# Patient Record
Sex: MALE | Race: WHITE | Employment: OTHER | URBAN - METROPOLITAN AREA
[De-identification: names, ages, dates, MRNs, and addresses within clinical notes are randomized per-mention and may not be internally consistent; named-entity substitution may affect disease eponyms.]

---

## 2017-01-09 RX ORDER — SIMVASTATIN 40 MG/1
TABLET, FILM COATED ORAL
Qty: 90 TAB | Refills: 1 | Status: SHIPPED | OUTPATIENT
Start: 2017-01-09 | End: 2017-04-20 | Stop reason: SDUPTHER

## 2017-03-10 RX ORDER — CARVEDILOL 25 MG/1
TABLET ORAL
Qty: 60 TAB | Refills: 0 | Status: SHIPPED | OUTPATIENT
Start: 2017-03-10 | End: 2017-04-20 | Stop reason: SDUPTHER

## 2017-03-10 RX ORDER — LISINOPRIL 10 MG/1
TABLET ORAL
Qty: 30 TAB | Refills: 0 | Status: SHIPPED | OUTPATIENT
Start: 2017-03-10 | End: 2017-04-05 | Stop reason: SDUPTHER

## 2017-04-05 RX ORDER — FUROSEMIDE 40 MG/1
TABLET ORAL
Qty: 90 TAB | Refills: 0 | Status: SHIPPED | OUTPATIENT
Start: 2017-04-05 | End: 2017-04-20 | Stop reason: SDUPTHER

## 2017-04-05 RX ORDER — LISINOPRIL 10 MG/1
TABLET ORAL
Qty: 30 TAB | Refills: 0 | Status: SHIPPED | OUTPATIENT
Start: 2017-04-05 | End: 2017-04-20 | Stop reason: SDUPTHER

## 2017-04-20 ENCOUNTER — HOSPITAL ENCOUNTER (OUTPATIENT)
Dept: LAB | Age: 77
Discharge: HOME | End: 2017-04-20
Payer: MEDICARE

## 2017-04-20 ENCOUNTER — OFFICE VISIT (OUTPATIENT)
Dept: INTERNAL MEDICINE CLINIC | Age: 77
End: 2017-04-20

## 2017-04-20 VITALS
HEART RATE: 64 BPM | DIASTOLIC BLOOD PRESSURE: 70 MMHG | BODY MASS INDEX: 31.01 KG/M2 | RESPIRATION RATE: 16 BRPM | SYSTOLIC BLOOD PRESSURE: 130 MMHG | OXYGEN SATURATION: 97 % | WEIGHT: 234 LBS | HEIGHT: 73 IN | TEMPERATURE: 97.9 F

## 2017-04-20 DIAGNOSIS — Z23 NEED FOR TDAP VACCINATION: ICD-10-CM

## 2017-04-20 DIAGNOSIS — M10.20 DRUG-INDUCED GOUT, UNSPECIFIED CHRONICITY, UNSPECIFIED SITE: ICD-10-CM

## 2017-04-20 DIAGNOSIS — Z28.21 INFLUENZA VACCINE REFUSED: Primary | ICD-10-CM

## 2017-04-20 DIAGNOSIS — I50.20 SYSTOLIC CONGESTIVE HEART FAILURE, UNSPECIFIED CONGESTIVE HEART FAILURE CHRONICITY: ICD-10-CM

## 2017-04-20 DIAGNOSIS — Z12.11 COLON CANCER SCREENING: ICD-10-CM

## 2017-04-20 DIAGNOSIS — Z23 ENCOUNTER FOR IMMUNIZATION: ICD-10-CM

## 2017-04-20 DIAGNOSIS — E78.2 MIXED HYPERLIPIDEMIA: ICD-10-CM

## 2017-04-20 DIAGNOSIS — I10 ESSENTIAL HYPERTENSION: ICD-10-CM

## 2017-04-20 DIAGNOSIS — Z13.5 GLAUCOMA SCREENING: ICD-10-CM

## 2017-04-20 DIAGNOSIS — I25.10 ATHEROSCLEROSIS OF NATIVE CORONARY ARTERY OF NATIVE HEART WITHOUT ANGINA PECTORIS: ICD-10-CM

## 2017-04-20 DIAGNOSIS — Z23 NEED FOR SHINGLES VACCINE: ICD-10-CM

## 2017-04-20 DIAGNOSIS — Z12.5 PROSTATE CANCER SCREENING: ICD-10-CM

## 2017-04-20 DIAGNOSIS — Z53.20 COLONOSCOPY REFUSED: ICD-10-CM

## 2017-04-20 PROCEDURE — 82270 OCCULT BLOOD FECES: CPT

## 2017-04-20 PROCEDURE — 87086 URINE CULTURE/COLONY COUNT: CPT

## 2017-04-20 PROCEDURE — 80061 LIPID PANEL: CPT

## 2017-04-20 PROCEDURE — 87077 CULTURE AEROBIC IDENTIFY: CPT

## 2017-04-20 PROCEDURE — 87088 URINE BACTERIA CULTURE: CPT

## 2017-04-20 PROCEDURE — 36415 COLL VENOUS BLD VENIPUNCTURE: CPT

## 2017-04-20 PROCEDURE — 84443 ASSAY THYROID STIM HORMONE: CPT

## 2017-04-20 PROCEDURE — 80053 COMPREHEN METABOLIC PANEL: CPT

## 2017-04-20 PROCEDURE — 81001 URINALYSIS AUTO W/SCOPE: CPT

## 2017-04-20 PROCEDURE — 85025 COMPLETE CBC W/AUTO DIFF WBC: CPT

## 2017-04-20 PROCEDURE — 84153 ASSAY OF PSA TOTAL: CPT

## 2017-04-20 RX ORDER — LISINOPRIL 10 MG/1
TABLET ORAL
Qty: 90 TAB | Refills: 1 | Status: SHIPPED | OUTPATIENT
Start: 2017-04-20 | End: 2017-11-02 | Stop reason: SDUPTHER

## 2017-04-20 RX ORDER — CARVEDILOL 25 MG/1
TABLET ORAL
Qty: 180 TAB | Refills: 1 | Status: SHIPPED | OUTPATIENT
Start: 2017-04-20 | End: 2017-10-06 | Stop reason: SDUPTHER

## 2017-04-20 RX ORDER — SIMVASTATIN 40 MG/1
TABLET, FILM COATED ORAL
Qty: 90 TAB | Refills: 1 | Status: SHIPPED | OUTPATIENT
Start: 2017-04-20 | End: 2018-01-10 | Stop reason: SDUPTHER

## 2017-04-20 RX ORDER — NIACIN 1000 MG/1
1000 TABLET, EXTENDED RELEASE ORAL
Qty: 90 TAB | Refills: 1 | Status: SHIPPED | OUTPATIENT
Start: 2017-04-20 | End: 2018-01-30 | Stop reason: SDUPTHER

## 2017-04-20 RX ORDER — ALLOPURINOL 300 MG/1
TABLET ORAL
Qty: 90 TAB | Refills: 1 | Status: SHIPPED | OUTPATIENT
Start: 2017-04-20 | End: 2017-11-02 | Stop reason: SDUPTHER

## 2017-04-20 RX ORDER — FUROSEMIDE 40 MG/1
TABLET ORAL
Qty: 90 TAB | Refills: 1 | Status: SHIPPED | OUTPATIENT
Start: 2017-04-20 | End: 2017-12-21 | Stop reason: SDUPTHER

## 2017-04-20 RX ORDER — CLOPIDOGREL BISULFATE 75 MG/1
75 TABLET ORAL DAILY
Qty: 90 TAB | Refills: 1 | Status: SHIPPED | OUTPATIENT
Start: 2017-04-20 | End: 2018-01-30 | Stop reason: SDUPTHER

## 2017-04-20 NOTE — PROGRESS NOTES
HPI:  Rachel Braun is a 77y.o. year old male who is here for a routine visit:    Has been doing well. No headache or dizziness. No chest pain or SOB. No cough or wheeze. No change in bowels or bladder. Is seeing cardiology regularly. No bleeding issues. Past Medical History:   Diagnosis Date    Coronary artery disease     Coronary atherosclerosis of native coronary artery 8/20/2010    Essential hypertension     Gout     Gout 8/20/2010    Heart failure ([address]     Hyperlipemia     Ischemic cardiomyopathy     Long term (current) use of antithrombotics/antiplatelets     Mixed hyperlipidemia 8/20/2010    Unspecified essential hypertension        Past Surgical History:   Procedure Laterality Date    BIOPSY PROSTATE      CARDIAC SURG PROCEDURE UNLIST      cabg    HX CHOLECYSTECTOMY      HX CORONARY ARTERY BYPASS GRAFT  12/26/06       Prior to Admission medications    Medication Sig Start Date End Date Taking? Authorizing Provider   carvedilol (COREG) 25 mg tablet TAKE ONE TABLET BY MOUTH TWICE A DAY WITH MEALS 4/20/17  Yes José Wyatt III, MD   furosemide (LASIX) 40 mg tablet TAKE ONE TABLET BY MOUTH DAILY 4/20/17  Yes José Wyatt III, MD   lisinopril (PRINIVIL, ZESTRIL) 10 mg tablet TAKE ONE TABLET BY MOUTH DAILY 4/20/17  Yes José Wyatt III, MD   simvastatin (ZOCOR) 40 mg tablet TAKE ONE TABLET BY MOUTH EVERY NIGHT 4/20/17  Yes José Wyatt III, MD   clopidogrel (PLAVIX) 75 mg tab Take 1 Tab by mouth daily. 4/20/17  Yes José Wyatt III, MD   allopurinol (ZYLOPRIM) 300 mg tablet TAKE ONE TABLET BY MOUTH DAILY 4/20/17  Yes José Wyatt III, MD   niacin (NIASPAN) 1,000 mg Tb24 tab Take 1 Tab by mouth nightly. 4/20/17  Yes Samra Nguyen MD   Diphth, Pertus,Acell,, Tetanus (BOOSTRIX TDAP) 2.5-8-5 Lf-mcg-Lf/0.5mL susp susp 0.5 mL by IntraMUSCular route once for 1 dose.  Indications: DIPHTHERIA-PERTUSSIS-TETANUS COMBINED PREVENTION 4/20/17 4/20/17 Yes Samra Nguyen MD varicella zoster vacine live (ZOSTAVAX) 19,400 unit/0.65 mL susr injection 1 Vial by SubCUTAneous route once for 1 dose. Indications: PREVENTION OF HERPES ZOSTER 4/20/17 4/20/17 Yes Vi Hinton MD   pneumococcal 13 demetrius conj dip (PREVNAR-13) 0.5 mL syrg injection 0.5 mL by IntraMUSCular route once for 1 dose. Indications: PREVENTION OF STREPTOCOCCUS PNEUMONIAE INFECTION 4/20/17 4/20/17 Yes Vi Hinton MD   methocarbamol (ROBAXIN) 500 mg tablet Take 1 tablet by mouth nightly as needed. 12/10/14  Yes Elli Garrett III, MD   colchicine 0.6 mg tablet Take 1 tablet by mouth two (2) times daily as needed. 12/10/14  Yes Elli Garrett III, MD   aspirin delayed-release 81 mg tablet Take 1 Tab by mouth daily. 5/1/14  Yes Elli Garrett III, MD   acetaminophen (TYLENOL ARTHRITIS) 650 mg CR tablet Take 2 Tabs by mouth every six (6) hours as needed for Pain. 12/26/12  Yes Vi Hinton MD   MULTIVITAMIN PO Take  by mouth. Yes Historical Provider   Cholecalciferol, Vitamin D3, (VITAMIN D) 2,000 unit Cap Take  by mouth. Yes Historical Provider   UBIDECARENONE (COQ-10 PO) Take  by mouth. Historical Provider       Social History     Social History    Marital status:      Spouse name: N/A    Number of children: N/A    Years of education: N/A     Occupational History    Not on file. Social History Main Topics    Smoking status: Former Smoker     Years: 30.00     Types: Cigarettes     Quit date: 1/1/1997    Smokeless tobacco: Never Used    Alcohol use 0.0 oz/week      Comment: occ.  Drug use: No    Sexual activity: Yes     Partners: Female     Other Topics Concern    Not on file     Social History Narrative          ROS  Per HPI. No gout issues or joint aches.      Visit Vitals    /70    Pulse 64    Temp 97.9 °F (36.6 °C) (Oral)    Resp 16    Ht 6' 1\" (1.854 m)    Wt 234 lb (106.1 kg)    SpO2 97%    BMI 30.87 kg/m2         Physical Exam   Physical Examination: General appearance - alert, well appearing, and in no distress  Mouth - mucous membranes moist, pharynx normal without lesions  Neck - supple, no significant adenopathy  Lymphatics - no palpable lymphadenopathy, no hepatosplenomegaly  Chest - clear to auscultation, no wheezes, rales or rhonchi, symmetric air entry  Heart - normal rate, regular rhythm, normal S1, S2, no murmurs, rubs, clicks or gallops  Abdomen - soft, nontender, nondistended, no masses or organomegaly  Extremities - peripheral pulses normal, no pedal edema, no clubbing or cyanosis      Assessment/Plan:  Amando Peña was seen today for medication evaluation. Diagnoses and all orders for this visit:    Influenza vaccine refused    Glaucoma screening  -     REFERRAL TO OPHTHALMOLOGY    Colonoscopy refused    Colon cancer screening  -     OCCULT BLOOD, IMMUNOASSAY (FIT)    Need for Tdap vaccination  -     Diphth, Pertus,Acell,, Tetanus (BOOSTRIX TDAP) 2.5-8-5 Lf-mcg-Lf/0.5mL susp susp; 0.5 mL by IntraMUSCular route once for 1 dose. Indications: DIPHTHERIA-PERTUSSIS-TETANUS COMBINED PREVENTION    Need for shingles vaccine  -     varicella zoster vacine live (ZOSTAVAX) 19,400 unit/0.65 mL susr injection; 1 Vial by SubCUTAneous route once for 1 dose. Indications: PREVENTION OF HERPES ZOSTER    Encounter for immunization  -     pneumococcal 13 demetrius conj dip (PREVNAR-13) 0.5 mL syrg injection; 0.5 mL by IntraMUSCular route once for 1 dose. Indications: PREVENTION OF STREPTOCOCCUS PNEUMONIAE INFECTION    Systolic congestive heart failure, unspecified congestive heart failure chronicity (HCC) - stable on current meds. Mixed hyperlipidemia - check for LDL of 100. Appears to be tolerating meds well.   -     CBC WITH AUTOMATED DIFF  -     LIPID PANEL  -     METABOLIC PANEL, COMPREHENSIVE  -     UA/M W/RFLX CULTURE, ROUTINE    Atherosclerosis of native coronary artery of native heart without angina pectoris - stable  -     carvedilol (COREG) 25 mg tablet; TAKE ONE TABLET BY MOUTH TWICE A DAY WITH MEALS  -     furosemide (LASIX) 40 mg tablet; TAKE ONE TABLET BY MOUTH DAILY  -     lisinopril (PRINIVIL, ZESTRIL) 10 mg tablet; TAKE ONE TABLET BY MOUTH DAILY  -     simvastatin (ZOCOR) 40 mg tablet; TAKE ONE TABLET BY MOUTH EVERY NIGHT  -     clopidogrel (PLAVIX) 75 mg tab; Take 1 Tab by mouth daily. -     niacin (NIASPAN) 1,000 mg Tb24 tab; Take 1 Tab by mouth nightly.  -     TSH RFX ON ABNORMAL TO FREE T4    Drug-induced gout, unspecified chronicity, unspecified site - stable on meds. -     allopurinol (ZYLOPRIM) 300 mg tablet; TAKE ONE TABLET BY MOUTH DAILY    Essential hypertension - BP controlled. Prostate cancer screening  -     PSA SCREENING (SCREENING)           Follow-up Disposition:  Return in about 1 year (around 4/20/2018) for CPE. Advised him to call back or return to office if symptoms worsen/change/persist.  Discussed expected course/resolution/complications of diagnosis in detail with patient. Medication risks/benefits/costs/interactions/alternatives discussed with patient. He was given an after visit summary which includes diagnoses, current medications, & vitals. He expressed understanding with the diagnosis and plan.

## 2017-04-20 NOTE — PROGRESS NOTES
Chief Complaint   Patient presents with    Medication Evaluation     Goals that were addressed/or need to be completed after this visit:  Health Maintenance Due   Topic    DTaP/Tdap/Td series (1 - Tdap)    ZOSTER VACCINE AGE 60>     MEDICARE YEARLY EXAM     GLAUCOMA SCREENING Q2Y     Pneumococcal 65+ Low/Medium Risk (2 of 2 - PPSV23)     · Patient declines influenza vaccines - will consider TDAP, zostavax, and Prevnar. · Refuses colonoscopy but agreeable to FIT testing. · Referral to eye ordered today.     Future Appointments - [facility]:  Date Time Provider Jonny Carbajal   11/8/2017 10:30 AM Kalia Marrufo MD [id number] [facility]

## 2017-04-20 NOTE — PATIENT INSTRUCTIONS
As discussed in your appointment today, [address] is an important part of planning for your healthcare future. Discussing your preferences with your family and your care team is a part of good healthcare so that we can be guided by your known values and goals. Our office offers this service for you at your convenience. Our Nurse Navigators and certified [employer] Facilitators, Thedore Claude and Pool Corbett typically schedule family appointments for this service on Wednesdays. To schedule an Advance Care Planning visit or to receive more information about this service, please call [facility] at [phone] and ask to speak directly to Alesha Mead or Group [email or web address]. Advance Care Planning: Care Instructions  Your Care Instructions  It can be hard to live with an illness that cannot be cured. But if your health is getting worse, you may want to make decisions about end-of-life care. Planning for the end of your life does not mean that you are giving up. It is a way to make sure that your wishes are met. Clearly stating your wishes can make it easier for your loved ones. Making plans while you are still able may also ease your mind and make your final days less stressful and more meaningful. Follow-up care is a key part of your treatment and safety. Be sure to make and go to all appointments, and call your doctor if you are having problems. It's also a good idea to know your test results and keep a list of the medicines you take. What can you do to plan for the end of life? · You can bring these issues up with your doctor. You do not need to wait until your doctor starts the conversation. You might start with \"I would not be willing to live with . Shelvy Setting Shelvy Setting Shelvy Setting \" When you complete this sentence it helps your doctor understand your wishes. · Talk openly and honestly with your doctor. This is the best way to understand the decisions you will need to make as your health changes.  Know that you can always change your mind. · Ask your doctor about commonly used life-support measures. These include tube feedings, breathing machines, and fluids given through a vein (IV). Understanding these treatments will help you decide whether you want them. · You may choose to have these life-supporting treatments for a limited time. This allows a trial period to see whether they will help you. You may also decide that you want your doctor to take only certain measures to keep you alive. It is important to spell out these conditions so that your doctor and family understand them. · Talk to your doctor about how long you are likely to live. He or she may be able to give you an idea of what usually happens with your specific illness. · Think about preparing papers that state your wishes. This way there will not be any confusion about what you want. You can change your instructions at any time. Which papers should you prepare? Advance directives are legal papers that tell doctors how you want to be cared for at the end of your life. You do not need a  to write these papers. Ask your doctor or your state health department for information on how to write your advance directives. They may have the forms for each of these types of papers. Make sure your doctor has a copy of these on file, and give a copy to a family member or close friend. · Consider a do-not-resuscitate order (DNR). This order asks that no extra treatments be done if your heart stops or you stop breathing. Extra treatments may include electrical shock to restart your heart or a machine to breathe for you. If you decide to have a DNR order, ask your doctor to explain and write it. Place the order in your home where everyone can easily see it. · Consider a living will. A living will explains your wishes in case you are in a coma or cannot communicate. Living neves tell doctors to use or not use treatments that would keep you alive.  You must have one or two witnesses or a notary present when you sign this form. · Consider a durable power of . This allows you to name a person to make decisions about your care if you are not able to. Most people ask a close friend or family member. Talk to this person about the kinds of treatments you want and those that you do not want. Make sure this person understands your wishes. If this person is not the health care agent named in your advance directive, also talk with your health care agent. These legal papers are simple to change. Tell your doctor what you want to change, and ask him or her to make a note in your medical file. Give your family updated copies of the papers.

## 2017-04-20 NOTE — MR AVS SNAPSHOT
Visit Information Date & Time Provider Department Dept. Phone Encounter #  
 4/20/2017  9:00 AM Yanique Blanco MD [facility] Internal Medicine [phone] [id number] Follow-up Instructions Return in about 1 year (around 4/20/2018) for CPE. Your Appointments 11/8/2017 10:30 AM  
PHYSICAL with Yanique Blanco MD  
[facility] Internal Medicine [address]) Appt Note: [address] [facility] [address]  
Ricardo MG [id number] [address] Upcoming Health Maintenance Date Due DTaP/Tdap/Td series (1 - Tdap) 5/17/1962 ZOSTER VACCINE AGE 60> 5/17/2001 MEDICARE YEARLY EXAM 5/17/2006 GLAUCOMA SCREENING Q2Y 11/23/2012 Pneumococcal 65+ Low/Medium Risk (2 of 2 - PPSV23) 1/13/2014 COLONOSCOPY 4/20/2027 Allergies as of 4/20/2017  Review Complete On: 4/20/2017 By: Yanique Blanco MD  
 No Known Allergies Current Immunizations  Reviewed on 2/4/2016 Name Date Influenza Vaccine Split 11/17/2011 Influenza Vaccine Whole 1/1/2007 Pneumococcal Vaccine (Unspecified Type) 1/13/2009 Not reviewed this visit You Were Diagnosed With   
  
 Codes Comments Influenza vaccine refused    -  Primary ICD-10-CM: Z28.21 ICD-9-CM: V64.06 Glaucoma screening     ICD-10-CM: Z13.5 ICD-9-CM: V80.1 Colonoscopy refused     ICD-10-CM: Z53.20 ICD-9-CM: V64.2 Colon cancer screening     ICD-10-CM: Z12.11 ICD-9-CM: V76.51 Need for Tdap vaccination     ICD-10-CM: Z65 ICD-9-CM: V06.1 Need for shingles vaccine     ICD-10-CM: G40 ICD-9-CM: V04.89 Encounter for immunization     ICD-10-CM: R18 ICD-9-CM: V03.89 Systolic congestive heart failure, unspecified congestive heart failure chronicity (HCC)     ICD-10-CM: I50.20 ICD-9-CM: 428.20, 428.0 Mixed hyperlipidemia     ICD-10-CM: E78.2 ICD-9-CM: 272.2 Atherosclerosis of native coronary artery of native heart without angina pectoris     ICD-10-CM: I25.10 ICD-9-CM: 414.01 Drug-induced gout, unspecified chronicity, unspecified site     ICD-10-CM: M10.20 ICD-9-CM: 274.9, E980.5 Essential hypertension     ICD-10-CM: I10 
ICD-9-CM: 401.9 Prostate cancer screening     ICD-10-CM: Z12.5 ICD-9-CM: V76.44 Vitals BP Pulse Temp Resp Height(growth percentile) Weight(growth percentile) 130/70 64 97.9 °F (36.6 °C) (Oral) 16 6' 1\" (1.854 m) 234 lb (106.1 kg) SpO2 BMI Smoking Status 97% 30.87 kg/m2 Former Smoker Vitals History BMI and BSA Data Body Mass Index Body Surface Area  
 30.87 kg/m 2 2.34 m 2 Preferred Pharmacy Pharmacy Name Phone [facility] [address], [address] [place] [address] [phone] Your Updated Medication List  
  
   
This list is accurate as of: 4/20/17  9:50 AM.  Always use your most recent med list.  
  
  
  
  
 allopurinol 300 mg tablet Commonly known as:  ZYLOPRIM  
TAKE ONE TABLET BY MOUTH DAILY  
  
 aspirin delayed-release 81 mg tablet Take 1 Tab by mouth daily. carvedilol 25 mg tablet Commonly known as:  COREG  
TAKE ONE TABLET BY MOUTH TWICE A DAY WITH MEALS  
  
 clopidogrel 75 mg Tab Commonly known as:  PLAVIX Take 1 Tab by mouth daily. colchicine 0.6 mg tablet Take 1 tablet by mouth two (2) times daily as needed. COQ-10 PO Take  by mouth. Diphth, Pertus(Acell), Tetanus 2.5-8-5 Lf-mcg-Lf/0.5mL Susp susp Commonly known as:  BOOSTRIX TDAP  
0.5 mL by IntraMUSCular route once for 1 dose. Indications: DIPHTHERIA-PERTUSSIS-TETANUS COMBINED PREVENTION  
  
 furosemide 40 mg tablet Commonly known as:  LASIX TAKE ONE TABLET BY MOUTH DAILY  
  
 lisinopril 10 mg tablet Commonly known as:  PRINIVIL, ZESTRIL  
TAKE ONE TABLET BY MOUTH DAILY  
  
 methocarbamol 500 mg tablet Commonly known as:  ROBAXIN  
 Take 1 tablet by mouth nightly as needed. MULTIVITAMIN PO Take  by mouth. niacin 1,000 mg Tb24 tab Commonly known as:  Sherryll Humble Take 1 Tab by mouth nightly. pneumococcal 13 demetrius conj dip 0.5 mL Syrg injection Commonly known as:  PREVNAR-13  
0.5 mL by IntraMUSCular route once for 1 dose. Indications: PREVENTION OF STREPTOCOCCUS PNEUMONIAE INFECTION  
  
 simvastatin 40 mg tablet Commonly known as:  ZOCOR  
TAKE ONE TABLET BY MOUTH EVERY NIGHT  
  
 TYLENOL ARTHRITIS 650 mg CR tablet Generic drug:  acetaminophen Take 2 Tabs by mouth every six (6) hours as needed for Pain.  
  
 varicella zoster vacine live 19,400 unit/0.65 mL Susr injection Commonly known as:  ZOSTAVAX  
1 Vial by SubCUTAneous route once for 1 dose. Indications: PREVENTION OF HERPES ZOSTER  
  
 VITAMIN D 2,000 unit Cap capsule Generic drug:  Cholecalciferol (Vitamin D3) Take  by mouth. Prescriptions Printed Refills Christofer Blazing,, Tetanus (BOOSTRIX TDAP) 2.5-8-5 Lf-mcg-Lf/0.5mL susp susp 0 Si.5 mL by IntraMUSCular route once for 1 dose. Indications: DIPHTHERIA-PERTUSSIS-TETANUS COMBINED PREVENTION Class: Print Route: IntraMUSCular  
 varicella zoster vacine live (ZOSTAVAX) 19,400 unit/0.65 mL susr injection 0 Si Vial by SubCUTAneous route once for 1 dose. Indications: PREVENTION OF HERPES ZOSTER Class: Print Route: SubCUTAneous  
 pneumococcal 13 demetrius conj dip (PREVNAR-13) 0.5 mL syrg injection 0 Si.5 mL by IntraMUSCular route once for 1 dose. Indications: PREVENTION OF STREPTOCOCCUS PNEUMONIAE INFECTION Class: Print Route: IntraMUSCular Prescriptions Sent to Pharmacy Refills  
 carvedilol (COREG) 25 mg tablet 1 Sig: TAKE ONE TABLET BY MOUTH TWICE A DAY WITH MEALS  Class: Normal  
 Pharmacy: [facility], [address] Ph #: [phone]  
 furosemide (LASIX) 40 mg tablet 1  
 Sig: TAKE ONE TABLET BY MOUTH DAILY Class: Normal  
 Pharmacy: [address] [place] [address] Ph #: [phone]  
 lisinopril (PRINIVIL, ZESTRIL) 10 mg tablet 1 Sig: TAKE ONE TABLET BY MOUTH DAILY Class: Normal  
 Pharmacy: [address] [place] [address] Ph #: [phone]  
 simvastatin (ZOCOR) 40 mg tablet 1 Sig: TAKE ONE TABLET BY MOUTH EVERY NIGHT Class: Normal  
 Pharmacy: [address] Ph #: [phone]  
 clopidogrel (PLAVIX) 75 mg tab 1 Sig: Take 1 Tab by mouth daily. Class: Normal  
 Pharmacy: [address] Ph #: [phone] Route: Oral  
 allopurinol (ZYLOPRIM) 300 mg tablet 1 Sig: TAKE ONE TABLET BY MOUTH DAILY Class: Normal  
 Pharmacy: [address] [place] [address] Ph #: [phone]  
 niacin (NIASPAN) 1,000 mg Tb24 tab 1 Sig: Take 1 Tab by mouth nightly. Class: Normal  
 Pharmacy: [address] Ph #: [phone] Route: Oral  
  
We Performed the Following CBC WITH AUTOMATED DIFF [06084 CPT(R)] LIPID PANEL [01043 CPT(R)] METABOLIC PANEL, COMPREHENSIVE [26417 CPT(R)] OCCULT BLOOD, IMMUNOASSAY (FIT) [id number] CPT(R)] PSA SCREENING (SCREENING) [ [facility]] REFERRAL TO OPHTHALMOLOGY [REF57 Custom] TSH RFX ON ABNORMAL TO FREE T4 [[id number] Custom] UA/M W/RFLX CULTURE, ROUTINE [[id number] Custom] Follow-up Instructions Return in about 1 year (around 4/20/2018) for CPE. Referral Information Referral ID Referred By Referred To  
  
 [id number] [address] [facility], [address] Visits Status Start Date End Date 1 New Request 4/20/17 4/20/18  If your referral has a status of pending review or denied, additional information will be sent to support the outcome of this decision. Patient Instructions As discussed in your appointment today, [address] is an important part of planning for your healthcare future. Discussing your preferences with your family and your care team is a part of good healthcare so that we can be guided by your known values and goals. Our office offers this service for you at your convenience. Our Nurse Navigators and certified Respecting Choices ® Facilitators, Paloma Hunt and Ilir Crawford typically schedule family appointments for this service on Wednesdays. To schedule an Advance Care Planning visit or to receive more information about this service, please call [facility] Internal Medicine at [phone] and ask to speak directly to Nikki Viera or Group Cloud[employer]. Advance Care Planning: Care Instructions Your Care Instructions It can be hard to live with an illness that cannot be cured. But if your health is getting worse, you may want to make decisions about end-of-life care. Planning for the end of your life does not mean that you are giving up. It is a way to make sure that your wishes are met. Clearly stating your wishes can make it easier for your loved ones. Making plans while you are still able may also ease your mind and make your final days less stressful and more meaningful. Follow-up care is a key part of your treatment and safety. Be sure to make and go to all appointments, and call your doctor if you are having problems. It's also a good idea to know your test results and keep a list of the medicines you take. What can you do to plan for the end of life? · You can bring these issues up with your doctor. You do not need to wait until your doctor starts the conversation. You might start with \"I would not be willing to live with . Ynes Chavez \" When you complete this sentence it helps your doctor understand your wishes. · Talk openly and honestly with your doctor. This is the best way to understand the decisions you will need to make as your health changes. Know that you can always change your mind. · Ask your doctor about commonly used life-support measures. These include tube feedings, breathing machines, and fluids given through a vein (IV). Understanding these treatments will help you decide whether you want them. · You may choose to have these life-supporting treatments for a limited time. This allows a trial period to see whether they will help you. You may also decide that you want your doctor to take only certain measures to keep you alive. It is important to spell out these conditions so that your doctor and family understand them. · Talk to your doctor about how long you are likely to live. He or she may be able to give you an idea of what usually happens with your specific illness. · Think about preparing papers that state your wishes. This way there will not be any confusion about what you want. You can change your instructions at any time. Which papers should you prepare? Advance directives are legal papers that tell doctors how you want to be cared for at the end of your life. You do not need a  to write these papers. Ask your doctor or your state [facility] department for information on how to write your advance directives. They may have the forms for each of these types of papers. Make sure your doctor has a copy of these on file, and give a copy to a family member or close friend. · Consider a do-not-resuscitate order (DNR). This order asks that no extra treatments be done if your heart stops or you stop breathing. Extra treatments may include electrical shock to restart your heart or a machine to breathe for you. If you decide to have a DNR order, ask your doctor to explain and write it. Place the order in your home where everyone can easily see it. · Consider a living will. A living will explains your wishes in case you are in a coma or cannot communicate. Living neves tell doctors to use or not use treatments that would keep you alive. You must have one or two witnesses or a notary present when you sign this form. · Consider a durable power of . This allows you to name a person to make decisions about your care if you are not able to. Most people ask a close friend or family member. Talk to this person about the kinds of treatments you want and those that you do not want. Make sure this person understands your wishes. If this person is not the health care agent named in your advance directive, also talk with your health care agent. These legal papers are simple to change. Tell your doctor what you want to change, and ask him or her to make a note in your medical file. Give your family updated copies of the papers. Introducing [facility] SERVICES! Dear Kashif Barajas: Thank you for requesting a creads account. Our records indicate that you have previously registered for a creads account but its currently inactive. Please call our creads support line at [phone]. Additional Information If you have questions, please visit the Frequently Asked Questions section of the creads website at [email or web address] Remember, creads is NOT to be used for urgent needs. For medical emergencies, dial 911. Now available from your iPhone and Android! Please provide this summary of care documentation to your next provider. Your primary care clinician is listed as Joseph Howard64 If you have any questions after today's visit, please call [phone].

## 2017-04-24 ENCOUNTER — HOSPITAL ENCOUNTER (OUTPATIENT)
Dept: LAB | Age: 77
Discharge: HOME | End: 2017-04-24
Payer: MEDICARE

## 2017-04-24 PROCEDURE — 82270 OCCULT BLOOD FECES: CPT

## 2017-04-25 LAB
ALBUMIN SERPL-MCNC: 4.4 G/DL (ref 3.5–4.8)
ALBUMIN/GLOB SERPL: 2.2 {RATIO} (ref 1.2–2.2)
ALP SERPL-CCNC: 112 IU/L (ref 39–117)
ALT SERPL-CCNC: 16 IU/L (ref 0–44)
APPEARANCE UR: CLEAR
AST SERPL-CCNC: 21 IU/L (ref 0–40)
BACTERIA #/AREA URNS HPF: ABNORMAL /[HPF]
BACTERIA UR CULT: ABNORMAL
BASOPHILS # BLD AUTO: 0 X10E3/UL (ref 0–0.2)
BASOPHILS NFR BLD AUTO: 1 %
BILIRUB SERPL-MCNC: 0.9 MG/DL (ref 0–1.2)
BILIRUB UR QL STRIP: NEGATIVE
BUN SERPL-MCNC: 20 MG/DL (ref 8–27)
BUN/CREAT SERPL: 18 (ref 10–24)
CALCIUM SERPL-MCNC: 9.1 MG/DL (ref 8.6–10.2)
CASTS URNS QL MICRO: ABNORMAL /LPF
CHLORIDE SERPL-SCNC: 103 MMOL/L (ref 96–106)
CHOLEST SERPL-MCNC: 125 MG/DL (ref 100–199)
CO2 SERPL-SCNC: 25 MMOL/L (ref 18–29)
COLOR UR: YELLOW
CREAT SERPL-MCNC: 1.13 MG/DL (ref 0.76–1.27)
EOSINOPHIL # BLD AUTO: 0.3 X10E3/UL (ref 0–0.4)
EOSINOPHIL NFR BLD AUTO: 4 %
EPI CELLS #/AREA URNS HPF: ABNORMAL /HPF
ERYTHROCYTE [DISTWIDTH] IN BLOOD BY AUTOMATED COUNT: 16.1 % (ref 12.3–15.4)
GLOBULIN SER CALC-MCNC: 2 G/DL (ref 1.5–4.5)
GLUCOSE SERPL-MCNC: 107 MG/DL (ref 65–99)
GLUCOSE UR QL: NEGATIVE
HCT VFR BLD AUTO: 45.1 % (ref 37.5–51)
HDLC SERPL-MCNC: 30 MG/DL
HEMOCCULT STL QL IA: NORMAL
HGB BLD-MCNC: 15 G/DL (ref 12.6–17.7)
HGB UR QL STRIP: NEGATIVE
IMM GRANULOCYTES # BLD: 0 X10E3/UL (ref 0–0.1)
IMM GRANULOCYTES NFR BLD: 0 %
KETONES UR QL STRIP: NEGATIVE
LDLC SERPL CALC-MCNC: 69 MG/DL (ref 0–99)
LEUKOCYTE ESTERASE UR QL STRIP: ABNORMAL
LYMPHOCYTES # BLD AUTO: 1 X10E3/UL (ref 0.7–3.1)
LYMPHOCYTES NFR BLD AUTO: 14 %
MCH RBC QN AUTO: 28.9 PG (ref 26.6–33)
MCHC RBC AUTO-ENTMCNC: 33.3 G/DL (ref 31.5–35.7)
MCV RBC AUTO: 87 FL (ref 79–97)
MICRO URNS: ABNORMAL
MONOCYTES # BLD AUTO: 0.6 X10E3/UL (ref 0.1–0.9)
MONOCYTES NFR BLD AUTO: 9 %
MUCOUS THREADS URNS QL MICRO: PRESENT
NEUTROPHILS # BLD AUTO: 4.9 X10E3/UL (ref 1.4–7)
NEUTROPHILS NFR BLD AUTO: 72 %
NITRITE UR QL STRIP: NEGATIVE
PH UR STRIP: 6 [PH] (ref 5–7.5)
PLATELET # BLD AUTO: 155 X10E3/UL (ref 150–379)
POTASSIUM SERPL-SCNC: 4.1 MMOL/L (ref 3.5–5.2)
PROT SERPL-MCNC: 6.4 G/DL (ref 6–8.5)
PROT UR QL STRIP: NEGATIVE
PSA SERPL-MCNC: 2.7 NG/ML (ref 0–4)
RBC # BLD AUTO: 5.19 X10E6/UL (ref 4.14–5.8)
RBC #/AREA URNS HPF: ABNORMAL /HPF
SODIUM SERPL-SCNC: 146 MMOL/L (ref 134–144)
SP GR UR: 1.01 (ref 1–1.03)
TRIGL SERPL-MCNC: 128 MG/DL (ref 0–149)
TSH SERPL DL<=0.005 MIU/L-ACNC: 0.66 UIU/ML (ref 0.45–4.5)
URINALYSIS REFLEX, 377202: ABNORMAL
UROBILINOGEN UR STRIP-MCNC: 0.2 MG/DL (ref 0.2–1)
VLDLC SERPL CALC-MCNC: 26 MG/DL (ref 5–40)
WBC # BLD AUTO: 6.7 X10E3/UL (ref 3.4–10.8)
WBC #/AREA URNS HPF: ABNORMAL /HPF

## 2017-04-26 LAB — HEMOCCULT STL QL IA: NEGATIVE

## 2017-08-23 LAB — EF %, EXTERNAL: NORMAL

## 2017-10-06 DIAGNOSIS — I25.10 ATHEROSCLEROSIS OF NATIVE CORONARY ARTERY OF NATIVE HEART WITHOUT ANGINA PECTORIS: ICD-10-CM

## 2017-10-06 RX ORDER — CARVEDILOL 25 MG/1
TABLET ORAL
Qty: 180 TAB | Refills: 0 | Status: SHIPPED | OUTPATIENT
Start: 2017-10-06 | End: 2018-01-10 | Stop reason: SDUPTHER

## 2017-11-02 DIAGNOSIS — I25.10 ATHEROSCLEROSIS OF NATIVE CORONARY ARTERY OF NATIVE HEART WITHOUT ANGINA PECTORIS: ICD-10-CM

## 2017-11-02 DIAGNOSIS — M10.20 DRUG-INDUCED GOUT, UNSPECIFIED CHRONICITY, UNSPECIFIED SITE: ICD-10-CM

## 2017-11-02 RX ORDER — LISINOPRIL 10 MG/1
TABLET ORAL
Qty: 90 TAB | Refills: 0 | Status: SHIPPED | OUTPATIENT
Start: 2017-11-02 | End: 2018-01-30 | Stop reason: DRUGHIGH

## 2017-11-02 RX ORDER — ALLOPURINOL 300 MG/1
TABLET ORAL
Qty: 90 TAB | Refills: 0 | Status: SHIPPED | OUTPATIENT
Start: 2017-11-02 | End: 2018-01-30 | Stop reason: SDUPTHER

## 2017-11-20 ENCOUNTER — HOSPITAL ENCOUNTER (OUTPATIENT)
Dept: CARDIAC CATH/INVASIVE PROCEDURES | Age: 77
Discharge: HOME | End: 2017-11-20
Attending: INTERNAL MEDICINE | Admitting: INTERNAL MEDICINE
Payer: MEDICARE

## 2017-11-20 VITALS
DIASTOLIC BLOOD PRESSURE: 61 MMHG | TEMPERATURE: 98 F | HEIGHT: 72 IN | SYSTOLIC BLOOD PRESSURE: 153 MMHG | OXYGEN SATURATION: 99 % | RESPIRATION RATE: 26 BRPM | WEIGHT: 236 LBS | HEART RATE: 68 BPM | BODY MASS INDEX: 31.97 KG/M2

## 2017-11-20 LAB
ANION GAP SERPL CALC-SCNC: 7 MMOL/L (ref 5–15)
BUN SERPL-MCNC: 15 MG/DL (ref 6–20)
BUN/CREAT SERPL: 13 (ref 12–20)
CALCIUM SERPL-MCNC: 9 MG/DL (ref 8.5–10.1)
CHLORIDE SERPL-SCNC: 106 MMOL/L (ref 97–108)
CO2 SERPL-SCNC: 29 MMOL/L (ref 21–32)
CREAT SERPL-MCNC: 1.19 MG/DL (ref 0.7–1.3)
ERYTHROCYTE [DISTWIDTH] IN BLOOD BY AUTOMATED COUNT: 14.9 % (ref 11.5–14.5)
GLUCOSE SERPL-MCNC: 86 MG/DL (ref 65–100)
HCT VFR BLD AUTO: 46.8 % (ref 36.6–50.3)
HGB BLD-MCNC: 15.5 G/DL (ref 12.1–17)
MCH RBC QN AUTO: 29.8 PG (ref 26–34)
MCHC RBC AUTO-ENTMCNC: 33.1 G/DL (ref 30–36.5)
MCV RBC AUTO: 89.8 FL (ref 80–99)
PLATELET # BLD AUTO: 144 K/UL (ref 150–400)
POTASSIUM SERPL-SCNC: 4.2 MMOL/L (ref 3.5–5.1)
RBC # BLD AUTO: 5.21 M/UL (ref 4.1–5.7)
SODIUM SERPL-SCNC: 142 MMOL/L (ref 136–145)
WBC # BLD AUTO: 6.6 K/UL (ref 4.1–11.1)

## 2017-11-20 PROCEDURE — 85027 COMPLETE CBC AUTOMATED: CPT | Performed by: INTERNAL MEDICINE

## 2017-11-20 PROCEDURE — 77030004532 HC CATH ANGI DX IMP BSC -A

## 2017-11-20 PROCEDURE — 36415 COLL VENOUS BLD VENIPUNCTURE: CPT | Performed by: INTERNAL MEDICINE

## 2017-11-20 PROCEDURE — C1894 INTRO/SHEATH, NON-LASER: HCPCS

## 2017-11-20 PROCEDURE — 74011250636 HC RX REV CODE- 250/636: Performed by: INTERNAL MEDICINE

## 2017-11-20 PROCEDURE — 77030013744

## 2017-11-20 PROCEDURE — C1760 CLOSURE DEV, VASC: HCPCS

## 2017-11-20 PROCEDURE — 93459 L HRT ART/GRFT ANGIO: CPT

## 2017-11-20 PROCEDURE — 74011636320 HC RX REV CODE- 636/320: Performed by: INTERNAL MEDICINE

## 2017-11-20 PROCEDURE — 80048 BASIC METABOLIC PNL TOTAL CA: CPT | Performed by: INTERNAL MEDICINE

## 2017-11-20 PROCEDURE — 77030004533 HC CATH ANGI DX IMP BSC -B

## 2017-11-20 RX ORDER — HEPARIN SODIUM 1000 [USP'U]/ML
10000 INJECTION, SOLUTION INTRAVENOUS; SUBCUTANEOUS
Status: DISCONTINUED | OUTPATIENT
Start: 2017-11-20 | End: 2017-11-20

## 2017-11-20 RX ORDER — SODIUM CHLORIDE 0.9 % (FLUSH) 0.9 %
10 SYRINGE (ML) INJECTION AS NEEDED
Status: DISCONTINUED | OUTPATIENT
Start: 2017-11-20 | End: 2017-11-20

## 2017-11-20 RX ORDER — SODIUM CHLORIDE 9 MG/ML
3 INJECTION, SOLUTION INTRAVENOUS CONTINUOUS
Status: DISPENSED | OUTPATIENT
Start: 2017-11-20 | End: 2017-11-20

## 2017-11-20 RX ORDER — CLOPIDOGREL 300 MG/1
600 TABLET, FILM COATED ORAL AS NEEDED
Status: DISCONTINUED | OUTPATIENT
Start: 2017-11-20 | End: 2017-11-20

## 2017-11-20 RX ORDER — LIDOCAINE HYDROCHLORIDE 10 MG/ML
10-30 INJECTION INFILTRATION; PERINEURAL ONCE
Status: DISPENSED | OUTPATIENT
Start: 2017-11-20 | End: 2017-11-21

## 2017-11-20 RX ORDER — SODIUM CHLORIDE 9 MG/ML
1.5 INJECTION, SOLUTION INTRAVENOUS CONTINUOUS
Status: DISPENSED | OUTPATIENT
Start: 2017-11-20 | End: 2017-11-20

## 2017-11-20 RX ORDER — MIDAZOLAM HYDROCHLORIDE 1 MG/ML
.5-1 INJECTION, SOLUTION INTRAMUSCULAR; INTRAVENOUS
Status: DISCONTINUED | OUTPATIENT
Start: 2017-11-20 | End: 2017-11-20

## 2017-11-20 RX ORDER — PRASUGREL 10 MG/1
10-60 TABLET, FILM COATED ORAL AS NEEDED
Status: DISCONTINUED | OUTPATIENT
Start: 2017-11-20 | End: 2017-11-20

## 2017-11-20 RX ORDER — HYDRALAZINE HYDROCHLORIDE 20 MG/ML
10 INJECTION INTRAMUSCULAR; INTRAVENOUS ONCE
Status: COMPLETED | OUTPATIENT
Start: 2017-11-20 | End: 2017-11-20

## 2017-11-20 RX ORDER — FENTANYL CITRATE 50 UG/ML
25-200 INJECTION, SOLUTION INTRAMUSCULAR; INTRAVENOUS
Status: DISCONTINUED | OUTPATIENT
Start: 2017-11-20 | End: 2017-11-20

## 2017-11-20 RX ORDER — SODIUM CHLORIDE 0.9 % (FLUSH) 0.9 %
SYRINGE (ML) INJECTION
Status: DISCONTINUED
Start: 2017-11-20 | End: 2017-11-21 | Stop reason: HOSPADM

## 2017-11-20 RX ORDER — CARVEDILOL 12.5 MG/1
25 TABLET ORAL ONCE
Status: ACTIVE | OUTPATIENT
Start: 2017-11-20 | End: 2017-11-21

## 2017-11-20 RX ORDER — HEPARIN SODIUM 200 [USP'U]/100ML
1000 INJECTION, SOLUTION INTRAVENOUS AS NEEDED
Status: DISCONTINUED | OUTPATIENT
Start: 2017-11-20 | End: 2017-11-20

## 2017-11-20 RX ORDER — ATROPINE SULFATE 0.1 MG/ML
1 INJECTION INTRAVENOUS AS NEEDED
Status: DISCONTINUED | OUTPATIENT
Start: 2017-11-20 | End: 2017-11-20

## 2017-11-20 RX ADMIN — HYDRALAZINE HYDROCHLORIDE 10 MG: 20 INJECTION INTRAMUSCULAR; INTRAVENOUS at 16:06

## 2017-11-20 RX ADMIN — MIDAZOLAM HYDROCHLORIDE 2 MG: 1 INJECTION, SOLUTION INTRAMUSCULAR; INTRAVENOUS at 14:19

## 2017-11-20 RX ADMIN — SODIUM CHLORIDE 1.5 ML/KG/HR: 900 INJECTION, SOLUTION INTRAVENOUS at 14:50

## 2017-11-20 RX ADMIN — FENTANYL CITRATE 25 MCG: 50 INJECTION, SOLUTION INTRAMUSCULAR; INTRAVENOUS at 14:29

## 2017-11-20 RX ADMIN — MIDAZOLAM HYDROCHLORIDE 1 MG: 1 INJECTION, SOLUTION INTRAMUSCULAR; INTRAVENOUS at 14:29

## 2017-11-20 RX ADMIN — FENTANYL CITRATE 50 MCG: 50 INJECTION, SOLUTION INTRAMUSCULAR; INTRAVENOUS at 14:19

## 2017-11-20 RX ADMIN — IOPAMIDOL 180 ML: 755 INJECTION, SOLUTION INTRAVENOUS at 14:51

## 2017-11-20 RX ADMIN — SODIUM CHLORIDE 3 ML/KG/HR: 900 INJECTION, SOLUTION INTRAVENOUS at 13:48

## 2017-11-20 RX ADMIN — HEPARIN SODIUM 2000 UNITS: 200 INJECTION, SOLUTION INTRAVENOUS at 14:22

## 2017-11-20 NOTE — PROGRESS NOTES
Transfer to [facility] from Procedure Area    Verbal report given to Dana Yu on Juan F Hightower being transferred to Cardiac Cath Lab  for ordered procedure   Patient is post left heart catheterization procedure. Patient stable upon transfer to . Report consisted of patients Situation, Background, Assessment and   Recommendations(SBAR). Information from the following report(s) SBAR, Kardex, Procedure Summary, Intake/Output, MAR and Recent Results was reviewed with the receiving nurse. Opportunity for questions and clarification was provided. Patient medicated during procedure with orders obtained and verified by Dr. Tiffanie Josue. Refer to patient PROCEDURE REPORT for vital signs, assessment, status, and response during procedure.

## 2017-11-20 NOTE — IP AVS SNAPSHOT
[address] [facility] [address] [place] [address] 
[phone] Patient: Remington Schwartz MRN: [id number] IOW:8/38/2807 About your hospitalization You were admitted on:  November 20, 2017 You last received care in the:  [address], [facility] You were discharged on:  November 20, 2017 Why you were hospitalized Your primary diagnosis was:  Not on File Things You Need To Do (next 8 weeks) Schedule an appointment with Margarita Thomas MD as soon as possible for a visit in 2 week(s) Phone:  [phone] Where:  [address] [address], [address] [facility] [address], [place] [id number] Discharge Orders None A check jay indicates which time of day the medication should be taken. My Medications TAKE these medications as instructed Instructions Each Dose to Equal  
 Morning Noon Evening Bedtime  
 allopurinol 300 mg tablet Commonly known as:  Emilee Bibber Your last dose was: Your next dose is: TAKE ONE TABLET BY MOUTH DAILY  
     
   
   
   
  
 aspirin delayed-release 81 mg tablet Your last dose was: Your next dose is: Take 1 Tab by mouth daily. 81 mg  
    
   
   
   
  
 carvedilol 25 mg tablet Commonly known as:  Becky Locket Your last dose was: Your next dose is: TAKE ONE TABLET BY MOUTH TWICE A DAY WITH MEALS  
     
   
   
   
  
 clopidogrel 75 mg Tab Commonly known as:  PLAVIX Your last dose was: Your next dose is: Take 1 Tab by mouth daily. 75 mg  
    
   
   
   
  
 colchicine 0.6 mg tablet Your last dose was: Your next dose is: Take 1 tablet by mouth two (2) times daily as needed. 0.6 mg  
    
   
   
   
  
 furosemide 40 mg tablet Commonly known as:  LASIX Your last dose was: Your next dose is: TAKE ONE TABLET BY MOUTH DAILY GLUCOSAMINE-CONDROITIN-HERB182 PO Your last dose was: Your next dose is: Take  by mouth two (2) times a day. lisinopril 10 mg tablet Commonly known as:  [place] Escort Your last dose was: Your next dose is: TAKE ONE TABLET BY MOUTH DAILY  
     
   
   
   
  
 methocarbamol 500 mg tablet Commonly known as:  ROBAXIN Your last dose was: Your next dose is: Take 1 tablet by mouth nightly as needed. 500 mg MULTIVITAMIN PO Your last dose was: Your next dose is: Take  by mouth. niacin 1,000 mg Tb24 tab Commonly known as:  [place] Rina Your last dose was: Your next dose is: Take 1 Tab by mouth nightly. 1000 mg  
    
   
   
   
  
 simvastatin 40 mg tablet Commonly known as:  ZOCOR Your last dose was: Your next dose is: TAKE ONE TABLET BY MOUTH EVERY NIGHT  
     
   
   
   
  
 TYLENOL ARTHRITIS 650 mg Tber Generic drug:  acetaminophen Your last dose was: Your next dose is: Take 2 Tabs by mouth every six (6) hours as needed for Pain. 1300 mg  
    
   
   
   
  
 VITAMIN D 2,000 unit Cap capsule Generic drug:  Cholecalciferol (Vitamin D3) Your last dose was: Your next dose is: Take  by mouth every other day. Discharge Instructions [email or web address] Patient Discharge Instructions Jesenia Wright / [id number] : 1940 Admitted 2017 Discharged: 2017 · It is important that you take the medication exactly as they are prescribed. · Keep your medication in the bottles provided by the pharmacist and keep a list of the medication names, dosages, and times to be taken in your wallet. · Do not take other medications without consulting your doctor. BRING ALL OF YOUR MEDICINES TO YOUR OFFICE VISIT with Dr. Shemar Manzano with Иван Kowalski MD in 2 week Cardiac Catheterization  Discharge Instructions ? Do not drive, operate any machinery, or sign any legal documents for 24 hours after your procedure. You must have someone to drive you home. ? You may take a shower 24 hours after your cardiac catheterization. Be sure to get the dressing wet and then remove it; gently wash the area with warm soapy water. Pat dry and leave open to air. To help prevent infections, be sure to keep the cath site clean and dry. No lotions, creams, powders, ointments, etc. in the cath site for approximately 1 week. ? Do not take a tub bath, get in a hot tub or swimming pool for approximately 5 days or until the cath site is completely healed. ? No strenuous activity or heavy lifting over 10 lbs. for 7 days. ? Drink plenty of fluids for 24-48 hours after your cath to flush the contrast dye from your kidneys. No alcoholic beverages for 24 hours. You may resume your previous diet (low fat, low cholesterol) after your cath. ? After your cath, some bruising or discomfort is common during the healing process. Tylenol, 1-2 tablets every 6 hours as needed, is recommended if you experience any discomfort. If you experience any signs or symptoms of infection such as fever, chills, or poorly healing incision, persistent tenderness or swelling in the groin, redness and/or warmth to the touch, numbness, significant tingling or pain at the groin site or affected extremity, rash, drainage from the cath site, or if the leg feels tight or swollen, call your physician right away. ? If bleeding at the cath site occurs, take a clean gauze pad and apply direct pressure to the groin just above the puncture site.   Call 911 immediately, and continue to apply direct pressure until an ambulance gets to your location. ? You may return to work  2  days after your cardiac cath if no groin bleeding. Information obtained by : 
I understand that if any problems occur once I am at home I am to contact my physician. I understand and acknowledge receipt of the instructions indicated above. R.N.'s Signature                                                                  Date/Time Patient or Representative Signature                                                          Date/Time Eloise Huber MD 
 
 
 
 
ACO Transitions of Care Introducing [employer] [address] Yanet Bolivar offers a voluntary care coordination program to provide high quality service and care to [facility] fee-for-service beneficiaries. Natanaelreed Mark was designed to help you enhance your health and well-being through the following services: ? Transitions of Care  support for individuals who are transitioning from one care setting to another (example: Hospital to home). ? Chronic and Complex Care Coordination  support for individuals and caregivers of those with serious or chronic illnesses or with more than one chronic (ongoing) condition and those who take a number of different medications. If you meet specific medical criteria, a [facility] [address] may call you directly to coordinate your care with your primary care physician and your other care providers. For questions about the [facility] MEDICAL [place] programs, please, contact your physicians office. For general questions or additional information about Accountable Care Organizations: Please visit www.medicare.gov/acos. html or call 1-800-MEDICARE (0-652.836.8097) TTY users should call 0-378.296.7187. Introducing [facility] & HEALTH SERVICES! Dear Codey Lugo: Thank you for requesting a Planwise account. Our records indicate that you have previously registered for a Planwise account but its currently inactive. Please call our Planwise support line at [phone]. Additional Information If you have questions, please visit the Frequently Asked Questions section of the Planwise website at [email or web address] Remember, Planwise is NOT to be used for urgent needs. For medical emergencies, dial 911. Now available from your iPhone and Android! Providers Seen During Your Hospitalization Provider Specialty Primary office phone Margarita Thomas MD Cardiology [phone] Your Primary Care Physician (PCP) Primary Care Physician Office Phone Office Fax Jesus Suero [address], [facility] [phone] You are allergic to the following Allergen Reactions Aggrastat (Tirofiban) Other (comments) Xin Coto. Recent Documentation Height Weight BMI Smoking Status 1.829 m 107 kg 32.01 kg/m2 Former Smoker Emergency Contacts Name Discharge Info Relation Home Work Mobile Brenda Mcmullen DISCHARGE CAREGIVER [3] Spouse [3] [phone] Patient Belongings The following personal items are in your possession at time of discharge: 
     Visual Aid: Glasses (reading glasses.) Please provide this summary of care documentation to your next provider. Signatures-by signing, you are acknowledging that this After Visit Summary has been reviewed with you and you have received a copy. Patient Signature:  ____________________________________________________________  Date:  ____________________________________________________________  
  
Hai Han    
    
 Provider Signature:  ____________________________________________________________ Date:  ____________________________________________________________

## 2017-11-20 NOTE — PROGRESS NOTES
TRANSFER - IN REPORT:    Verbal report received from LAKE Varela RN on Anaya Rose, Procedure Cath procedure with no stents , from the Cardiac Cath lab, for routine progression of care. Report consisted of patients Situation, Background, Assessment and Recommendations(SBAR). Information from the following report(s) Procedure Summary, MAR and Recent Results was reviewed with the receiving clinician. Opportunity for questions and clarification was provided. Assessment completed upon patients arrival to [address] [place] [address] and care assumed. Cardiac Cath Lab Recovery Arrival Note:     Anaya Rose arrived to [facility] recovery area. Patient procedure= Cath procedure with no stents. Patient on cardiac monitor, non-invasive blood pressure, Patient status doing well without problems. Patient is A&Ox 4. Patient reports no pain, no chest pain, no n/v. Procedure site without any bleeding and no hematoma.

## 2017-11-20 NOTE — PROGRESS NOTES
Dr. Jackelyn Vargas made aware of the patient's elevated blood pressure. Order received. 1605:  Head of bed elevated 30degrees right groin site without bleeding and no hematoma. 1634: I have reviewed discharge instructions with the patient and spouse. The patient and spouse verbalized understanding. 1653:  Dr. Jackelyn Vargas made aware of the patient's elevated blood pressure. Patient to take PO coreg at home. Damián Weir ambulated @ 5379 (time) approximately 100 feet. Patient tolerated ambulation without adverse advents. right groin (right/left, groin/arm)  without bleeding or hematoma noted.

## 2017-11-20 NOTE — PROGRESS NOTES
Cardiac Cath Lab Recovery Arrival Note:      Franklyn Bowie arrived to Cardiac Cath Lab, Recovery Area. Staff introduced to patient. Patient identifiers verified with NAME and DATE OF BIRTH. Procedure verified with patient. Consent forms reviewed and signed by patient or authorized representative and verified. Allergies verified. Patient informed of procedure and plan of care. Questions answered with review. Patient prepped for procedure, per orders from physician, prior to arrival.    Patient on cardiac monitor, non-invasive blood pressure, SPO2 monitor. Patient is A&Ox 4. Patient reports no complaints. Patient in stretcher, in low position, with side rails up, call bell within reach, patient instructed to call of assistance as needed. Patient prep in: [facility] Recovery Area, Bed# 2. Family in: waiting room. Prep by: TAYLA Crandall

## 2017-11-20 NOTE — PROGRESS NOTES
1259:  Cardiac Cath Lab Recovery Arrival Note:      Anaya Rose arrived to Cardiac Cath Lab, Recovery Area. Staff introduced to patient. Patient identifiers verified with NAME and DATE OF BIRTH. Procedure verified with patient. Consent forms reviewed and signed by patient or authorized representative and verified. Allergies verified. Patient informed of procedure and plan of care. Questions answered with review. Patient prepped for procedure, per orders from physician, prior to arrival.    Patient on cardiac monitor, non-invasive blood pressure, SPO2 monitor. Patient is A&Ox 4. Patient reports no complaints. Patient in stretcher, in low position, with side rails up, call bell within reach, patient instructed to call of assistance as needed. Patient prep in: [facility] Recovery Area, Bed# 2. Family in: waiting room. Prep by: TAYLA Alex

## 2017-11-20 NOTE — IP AVS SNAPSHOT
2700 [facility] [address] [place] [address] 
[phone] Patient: Richardson Marinelli MRN: [id number] TNF:3/22/2098 My Medications TAKE these medications as instructed Instructions Each Dose to Equal  
 Morning Noon Evening Bedtime  
 allopurinol 300 mg tablet Commonly known as:  Darvin Quintanilla Your last dose was: Your next dose is: TAKE ONE TABLET BY MOUTH DAILY  
     
   
   
   
  
 aspirin delayed-release 81 mg tablet Your last dose was: Your next dose is: Take 1 Tab by mouth daily. 81 mg  
    
   
   
   
  
 carvedilol 25 mg tablet Commonly known as:  Orestes Garduno Your last dose was: Your next dose is: TAKE ONE TABLET BY MOUTH TWICE A DAY WITH MEALS  
     
   
   
   
  
 clopidogrel 75 mg Tab Commonly known as:  PLAVIX Your last dose was: Your next dose is: Take 1 Tab by mouth daily. 75 mg  
    
   
   
   
  
 colchicine 0.6 mg tablet Your last dose was: Your next dose is: Take 1 tablet by mouth two (2) times daily as needed. 0.6 mg  
    
   
   
   
  
 furosemide 40 mg tablet Commonly known as:  LASIX Your last dose was: Your next dose is: TAKE ONE TABLET BY MOUTH DAILY  
     
   
   
   
  
 GLUCOSAMINE-CONDROITIN-HERB182 PO Your last dose was: Your next dose is: Take  by mouth two (2) times a day. lisinopril 10 mg tablet Commonly known as:  Arianna Reasons Your last dose was: Your next dose is: TAKE ONE TABLET BY MOUTH DAILY  
     
   
   
   
  
 methocarbamol 500 mg tablet Commonly known as:  ROBAXIN Your last dose was: Your next dose is: Take 1 tablet by mouth nightly as needed. 500 mg MULTIVITAMIN PO Your last dose was: Your next dose is: Take  by mouth. niacin 1,000 mg Tb24 tab Commonly known as:  Aureliano Schwartz Your last dose was: Your next dose is: Take 1 Tab by mouth nightly. 1000 mg  
    
   
   
   
  
 simvastatin 40 mg tablet Commonly known as:  ZOCOR Your last dose was: Your next dose is: TAKE ONE TABLET BY MOUTH EVERY NIGHT  
     
   
   
   
  
 TYLENOL ARTHRITIS 650 mg Tber Generic drug:  acetaminophen Your last dose was: Your next dose is: Take 2 Tabs by mouth every six (6) hours as needed for Pain. 1300 mg  
    
   
   
   
  
 VITAMIN D 2,000 unit Cap capsule Generic drug:  Cholecalciferol (Vitamin D3) Your last dose was: Your next dose is: Take  by mouth every other day.

## 2017-11-20 NOTE — PROGRESS NOTES
Cardiac Cath Lab Procedure Area Arrival Note:    Richardson Marinelli arrived to Cardiac Cath Lab, Procedure Area. Patient identifiers verified with NAME and DATE OF BIRTH. Procedure verified with patient. Consent forms verified. Allergies verified. Patient informed of procedure and plan of care. Questions answered with review. Patient voiced understanding of procedure and plan of care. Patient on cardiac monitor, non-invasive blood pressure, SPO2 monitor. On room air and placed on O2 @ 2 lpm via nasal cannula. IV of normal saline on pump at 321 ml/hr. Patient status doing well without problems. Patient is A&Ox 4. Patient reports no pain. Patient medicated during procedure with orders obtained and verified by Dr. Chavo He. Refer to patients Cardiac Cath Lab PROCEDURE REPORT for vital signs, assessment, status, and response during procedure, printed at end of case. Printed report on chart or scanned into chart.

## 2017-11-20 NOTE — DISCHARGE INSTRUCTIONS
[email or web address]    Patient Discharge Instructions    Jackelyn Hassan / [id number] : 1940    Admitted 2017 Discharged: 2017       · It is important that you take the medication exactly as they are prescribed. · Keep your medication in the bottles provided by the pharmacist and keep a list of the medication names, dosages, and times to be taken in your wallet. · Do not take other medications without consulting your doctor. BRING ALL OF YOUR MEDICINES TO YOUR OFFICE VISIT with Dr. Abe Kauffman with Jenniffer Sims MD in 2 week      Cardiac Catheterization  Discharge Instructions     Do not drive, operate any machinery, or sign any legal documents for 24 hours after your procedure. You must have someone to drive you home.  You may take a shower 24 hours after your cardiac catheterization. Be sure to get the dressing wet and then remove it; gently wash the area with warm soapy water. Pat dry and leave open to air. To help prevent infections, be sure to keep the cath site clean and dry. No lotions, creams, powders, ointments, etc. in the cath site for approximately 1 week.  Do not take a tub bath, get in a hot tub or swimming pool for approximately 5 days or until the cath site is completely healed.  No strenuous activity or heavy lifting over 10 lbs. for 7 days.  Drink plenty of fluids for 24-48 hours after your cath to flush the contrast dye from your kidneys. No alcoholic beverages for 24 hours. You may resume your previous diet (low fat, low cholesterol) after your cath.  After your cath, some bruising or discomfort is common during the healing process. Tylenol, 1-2 tablets every 6 hours as needed, is recommended if you experience any discomfort.   If you experience any signs or symptoms of infection such as fever, chills, or poorly healing incision, persistent tenderness or swelling in the groin, redness and/or warmth to the touch, numbness, significant tingling or pain at the groin site or affected extremity, rash, drainage from the cath site, or if the leg feels tight or swollen, call your physician right away.  If bleeding at the cath site occurs, take a clean gauze pad and apply direct pressure to the groin just above the puncture site. Call 911 immediately, and continue to apply direct pressure until an ambulance gets to your location.  You may return to work  2  days after your cardiac cath if no groin bleeding. Information obtained by :  I understand that if any problems occur once I am at home I am to contact my physician. I understand and acknowledge receipt of the instructions indicated above.                                                                                                                                            R.N.'s Signature                                                                  Date/Time                                                                                                                                              Patient or Representative Signature                                                          Date/Time      Elisa Chino MD

## 2017-11-20 NOTE — PROCEDURES
Cardiac Catheterization Procedure Note   Patient: Jesenia Wright  MRN: [id number]  SSN: [id number]   YOB: 1940 Age: 69 y.o.   Sex: male    Date of Procedure: 11/20/2017   Pre-procedure Diagnosis: Unstable Angina/Cardiomyopathy  Post-procedure Diagnosis: Coronary Artery Disease/Cardiomyopathy  Procedure: Left Heart Cath  :  Dr. Nidhi Young MD    Assistant(s):  None  Anesthesia: Moderate Sedation   Estimated Blood Loss: Less than 10 mL   Specimens Removed: None  Findings: LVG; EF 25% with akinetic inferobasal and moderate to severe hypokinesis in remaining wall; LM is ok; RCA with mid diffuse 75%; LAD is occluded after D1; LCX with occluded OM1; Ramus with proximal stent widely patent; SVG-OM2 is patent ; LIMA-LAD is patent; SVG-PDA is patent; SVG-D1 is patent and SVG-D2 is patent  Complications: None   Implants:  None  Signed by:  Nidhi Young MD  11/20/2017  3:00 PM

## 2017-12-21 DIAGNOSIS — I25.10 ATHEROSCLEROSIS OF NATIVE CORONARY ARTERY OF NATIVE HEART WITHOUT ANGINA PECTORIS: ICD-10-CM

## 2017-12-21 RX ORDER — FUROSEMIDE 40 MG/1
TABLET ORAL
Qty: 90 TAB | Refills: 0 | Status: SHIPPED | OUTPATIENT
Start: 2017-12-21 | End: 2018-01-30 | Stop reason: SDUPTHER

## 2017-12-26 ENCOUNTER — TELEPHONE (OUTPATIENT)
Dept: CARDIAC REHAB | Age: 77
End: 2017-12-26

## 2017-12-26 NOTE — TELEPHONE ENCOUNTER
12/26/2017 Cardiac Rehab: Called Mr. Jesse Middleton  to discuss participation in the Cardiac Rehab Program following an admission for [facility] on 11/20/17. Left voicemail message. Will f/u 12/29/17.  Bertrand Fuentes RN

## 2017-12-29 ENCOUNTER — TELEPHONE (OUTPATIENT)
Dept: CARDIAC REHAB | Age: 77
End: 2017-12-29

## 2017-12-29 NOTE — TELEPHONE ENCOUNTER
12/29/2017 Cardiac Rehab: Called an spoke with Zina De Paz. He is coming in on 1/3/18 for a \"pacemaker\". He will need to wait until he recovers from the pacemaker insertion. Will follow after insertion for enrollment. Dragan Wong RN    12/26/2017 Cardiac Rehab: Called Mr. Zina De Paz  to discuss participation in the Cardiac Rehab Program following an admission for [facility] on 11/20/17. Left voicemail message. Will f/u 12/29/17.  Elmer Todd RN

## 2018-01-03 ENCOUNTER — TELEPHONE (OUTPATIENT)
Dept: CARDIAC REHAB | Age: 78
End: 2018-01-03

## 2018-01-03 NOTE — TELEPHONE ENCOUNTER
1/3/2018 Cardiac Rehab: Called Mr. Dominique Huang  to discuss participation in the Cardiac Rehab Program following shf on Nov. 2017. Spoke with pt. - he has postponed the pacemaker insertion and \"everything else\" due to his wife's hospitalization. Agreed to a f/u call the week of 1/29/2018.   Yesica Jerome RN

## 2018-01-10 DIAGNOSIS — I25.10 ATHEROSCLEROSIS OF NATIVE CORONARY ARTERY OF NATIVE HEART WITHOUT ANGINA PECTORIS: ICD-10-CM

## 2018-01-10 RX ORDER — SIMVASTATIN 40 MG/1
TABLET, FILM COATED ORAL
Qty: 90 TAB | Refills: 0 | Status: SHIPPED | OUTPATIENT
Start: 2018-01-10 | End: 2018-01-30 | Stop reason: SDUPTHER

## 2018-01-10 RX ORDER — CARVEDILOL 25 MG/1
TABLET ORAL
Qty: 180 TAB | Refills: 0 | Status: SHIPPED | OUTPATIENT
Start: 2018-01-10 | End: 2018-01-30 | Stop reason: SDUPTHER

## 2018-01-30 ENCOUNTER — OFFICE VISIT (OUTPATIENT)
Dept: INTERNAL MEDICINE CLINIC | Age: 78
End: 2018-01-30

## 2018-01-30 ENCOUNTER — HOSPITAL ENCOUNTER (OUTPATIENT)
Dept: LAB | Age: 78
Discharge: HOME | End: 2018-01-30
Payer: MEDICARE

## 2018-01-30 VITALS
HEART RATE: 57 BPM | RESPIRATION RATE: 14 BRPM | DIASTOLIC BLOOD PRESSURE: 82 MMHG | WEIGHT: 240 LBS | OXYGEN SATURATION: 95 % | HEIGHT: 72 IN | SYSTOLIC BLOOD PRESSURE: 158 MMHG | BODY MASS INDEX: 32.51 KG/M2 | TEMPERATURE: 98 F

## 2018-01-30 DIAGNOSIS — E78.2 MIXED HYPERLIPIDEMIA: ICD-10-CM

## 2018-01-30 DIAGNOSIS — I25.10 ATHEROSCLEROSIS OF NATIVE CORONARY ARTERY OF NATIVE HEART WITHOUT ANGINA PECTORIS: ICD-10-CM

## 2018-01-30 DIAGNOSIS — I10 ESSENTIAL HYPERTENSION: ICD-10-CM

## 2018-01-30 DIAGNOSIS — E55.9 VITAMIN D DEFICIENCY DISEASE: ICD-10-CM

## 2018-01-30 DIAGNOSIS — M10.20 DRUG-INDUCED GOUT, UNSPECIFIED CHRONICITY, UNSPECIFIED SITE: ICD-10-CM

## 2018-01-30 DIAGNOSIS — I50.22 CHRONIC SYSTOLIC CONGESTIVE HEART FAILURE (HCC): Primary | ICD-10-CM

## 2018-01-30 PROCEDURE — 85025 COMPLETE CBC W/AUTO DIFF WBC: CPT

## 2018-01-30 PROCEDURE — 80053 COMPREHEN METABOLIC PANEL: CPT

## 2018-01-30 PROCEDURE — 84550 ASSAY OF BLOOD/URIC ACID: CPT

## 2018-01-30 PROCEDURE — 80061 LIPID PANEL: CPT

## 2018-01-30 PROCEDURE — 36415 COLL VENOUS BLD VENIPUNCTURE: CPT

## 2018-01-30 RX ORDER — NIACIN 1000 MG/1
1000 TABLET, EXTENDED RELEASE ORAL
Qty: 90 TAB | Refills: 3 | Status: SHIPPED | OUTPATIENT
Start: 2018-01-30 | End: 2019-01-24 | Stop reason: ALTCHOICE

## 2018-01-30 RX ORDER — CARVEDILOL 25 MG/1
TABLET ORAL
Qty: 180 TAB | Refills: 3 | Status: SHIPPED | OUTPATIENT
Start: 2018-01-30 | End: 2019-03-30 | Stop reason: SDUPTHER

## 2018-01-30 RX ORDER — SIMVASTATIN 40 MG/1
TABLET, FILM COATED ORAL
Qty: 90 TAB | Refills: 3 | Status: SHIPPED | OUTPATIENT
Start: 2018-01-30 | End: 2019-03-30 | Stop reason: SDUPTHER

## 2018-01-30 RX ORDER — FUROSEMIDE 40 MG/1
TABLET ORAL
Qty: 90 TAB | Refills: 3 | Status: SHIPPED | OUTPATIENT
Start: 2018-01-30 | End: 2019-03-19 | Stop reason: SDUPTHER

## 2018-01-30 RX ORDER — ALLOPURINOL 300 MG/1
TABLET ORAL
Qty: 90 TAB | Refills: 3 | Status: SHIPPED | OUTPATIENT
Start: 2018-01-30 | End: 2019-01-25 | Stop reason: SDUPTHER

## 2018-01-30 RX ORDER — CLOPIDOGREL BISULFATE 75 MG/1
75 TABLET ORAL DAILY
Qty: 90 TAB | Refills: 3 | Status: SHIPPED | OUTPATIENT
Start: 2018-01-30 | End: 2019-03-02 | Stop reason: SDUPTHER

## 2018-01-30 RX ORDER — LISINOPRIL 20 MG/1
20 TABLET ORAL DAILY
Qty: 90 TAB | Refills: 3 | Status: SHIPPED | OUTPATIENT
Start: 2018-01-30 | End: 2019-02-06 | Stop reason: SDUPTHER

## 2018-01-30 RX ORDER — LISINOPRIL 10 MG/1
TABLET ORAL
Qty: 90 TAB | Refills: 0 | Status: CANCELLED | OUTPATIENT
Start: 2018-01-30

## 2018-01-30 NOTE — PATIENT INSTRUCTIONS
Please weight daily and record readings. Call for weight gain of 3 pounds in one day or 5 pounds in one week.

## 2018-01-30 NOTE — PROGRESS NOTES
HPI:  Mehrdad Olivera is a 69y.o. year old male who is here for a routine visit:    Here for routine follow-up visit. He has been generally doing well. He has been seeing cardiology for follow-up routinely. He did have a catheterization done in the fall and his ejection fraction was down to 25%. For that reason he was scheduled for defibrillator but had to reschedule because of illness and his wife. He denies any exertional chest pain. He is using no nitroglycerin. No headaches no dizziness no nosebleeds. No PND orthopnea. No nausea vomiting. No edema. He is otherwise doing great. He does have some occasional feeling of a kidney stone and has seen urologist for follow-up in the last year. Past Medical History:   Diagnosis Date    Coronary atherosclerosis of native coronary artery 8/20/2010    Essential hypertension     Gout 8/20/2010    Heart failure ([facility] [address]     Ischemic cardiomyopathy     Long term (current) use of antithrombotics/antiplatelets     Mixed hyperlipidemia 8/20/2010       Past Surgical History:   Procedure Laterality Date    BIOPSY PROSTATE      CARDIAC SURG PROCEDURE UNLIST      cabg    HX CHOLECYSTECTOMY      HX CORONARY ARTERY BYPASS GRAFT  12/26/06       Prior to Admission medications    Medication Sig Start Date End Date Taking? Authorizing Provider   simvastatin (ZOCOR) 40 mg tablet TAKE ONE TABLET BY MOUTH AT BEDTIME 1/30/18  Yes Brenden Ruiz III, MD   carvedilol (COREG) 25 mg tablet TAKE ONE TABLET BY MOUTH TWICE A DAY WITH MEALS 1/30/18  Yes Brenden Ruiz III, MD   furosemide (LASIX) 40 mg tablet TAKE ONE TABLET BY MOUTH DAILY 1/30/18  Yes Brenden Ruiz III, MD   allopurinol (ZYLOPRIM) 300 mg tablet TAKE ONE TABLET BY MOUTH DAILY 1/30/18  Yes Brenden Ruiz III, MD   clopidogrel (PLAVIX) 75 mg tab Take 1 Tab by mouth daily. 1/30/18  Yes Brenden Ruiz III, MD   niacin (NIASPAN) 1,000 mg Tb24 tab Take 1 Tab by mouth nightly.  1/30/18  Yes Liudmila Torres MD lisinopril (PRINIVIL, ZESTRIL) 20 mg tablet Take 1 Tab by mouth daily. 1/30/18  Yes Israel Guadalupe III, MD   SEBVYPOOPOE-[id number] PO Take  by mouth two (2) times a day. Yes Historical Provider   aspirin delayed-release 81 mg tablet Take 1 Tab by mouth daily. 5/1/14  Yes Israel Guadalupe III, MD   acetaminophen (TYLENOL ARTHRITIS) 650 mg CR tablet Take 2 Tabs by mouth every six (6) hours as needed for Pain. 12/26/12  Yes Giles Jeffers MD   MULTIVITAMIN PO Take  by mouth. Yes Historical Provider   Cholecalciferol, Vitamin D3, (VITAMIN D) 2,000 unit Cap Take  by mouth every other day. Yes Historical Provider   methocarbamol (ROBAXIN) 500 mg tablet Take 1 tablet by mouth nightly as needed. 12/10/14   Israel Guadalupe III, MD   colchicine 0.6 mg tablet Take 1 tablet by mouth two (2) times daily as needed. 12/10/14   Giles Jeffers MD       Social History     Social History    Marital status:      Spouse name: N/A    Number of children: N/A    Years of education: N/A     Occupational History    Not on file.      Social History Main Topics    Smoking status: Former Smoker     Years: 30.00     Types: Cigarettes     Quit date: 1/1/1997    Smokeless tobacco: Never Used    Alcohol use No    Drug use: No    Sexual activity: Yes     Partners: Female     Other Topics Concern    Not on file     Social History Narrative          ROS  Per HPI    Visit Vitals    /82    Pulse (!) 57    Temp 98 °F (36.7 °C) (Oral)    Resp 14    Ht 6' (1.829 m)    Wt 240 lb (108.9 kg)    SpO2 95%    BMI 32.55 kg/m2         Physical Exam   Physical Examination: General appearance - alert, well appearing, and in no distress  Mouth - mucous membranes moist, pharynx normal without lesions  Neck - supple, no significant adenopathy  Lymphatics - no palpable lymphadenopathy, no hepatosplenomegaly  Chest - clear to auscultation, no wheezes, rales or rhonchi, symmetric air entry  Heart - normal rate and regular rhythm  Abdomen - soft, nontender, nondistended, no masses or organomegaly  Extremities - peripheral pulses normal, no pedal edema, no clubbing or cyanosis      Assessment/Plan:  Diagnoses and all orders for this visit:    1. Chronic systolic congestive heart failure (HCC) -appears stable. Given his lower ejection fraction and higher blood pressure today, will increase his lisinopril to 20 mg a day. I also asked him to monitor his weight on a daily basis and gave him parameters for calling should it be elevated. Will notify cardiology of these changes. I did agree with that the placement of a defibrillator. He will call the cardiologist office to reschedule. 2. Atherosclerosis of native coronary artery of native heart without angina pectoris -appears stable. Will continue current medicines and check LDL for a goal of 70.  -     simvastatin (ZOCOR) 40 mg tablet; TAKE ONE TABLET BY MOUTH AT BEDTIME  -     carvedilol (COREG) 25 mg tablet; TAKE ONE TABLET BY MOUTH TWICE A DAY WITH MEALS  -     furosemide (LASIX) 40 mg tablet; TAKE ONE TABLET BY MOUTH DAILY  -     clopidogrel (PLAVIX) 75 mg tab; Take 1 Tab by mouth daily. -     niacin (NIASPAN) 1,000 mg Tb24 tab; Take 1 Tab by mouth nightly. -     METABOLIC PANEL, COMPREHENSIVE  -     LIPID PANEL  -     CBC WITH AUTOMATED DIFF    3. Drug-induced gout, unspecified chronicity, unspecified site -no recurrence. He  -     allopurinol (ZYLOPRIM) 300 mg tablet; TAKE ONE TABLET BY MOUTH DAILY  -     URIC ACID    4. Vitamin D deficiency disease    5. Mixed hyperlipidemia    6. Essential hypertension -not well controlled. Will increase the ace as above. Other orders  -     lisinopril (PRINIVIL, ZESTRIL) 20 mg tablet; Take 1 Tab by mouth daily.        Follow-up Disposition: 6 months and as needed       Advised him to call back or return to office if symptoms worsen/change/persist.  Discussed expected course/resolution/complications of diagnosis in detail with patient. Medication risks/benefits/costs/interactions/alternatives discussed with patient. He was given an after visit summary which includes diagnoses, current medications, & vitals. He expressed understanding with the diagnosis and plan.

## 2018-01-30 NOTE — MR AVS SNAPSHOT
[address] [facility] [address] [place] [address] 
[phone] Patient: Abdiel Gong MRN: [id number] WJX:4[birth year]80[birth year]150 Visit Information Date & Time Provider Department Dept. Phone Encounter #  
 1/30/2018  8:30 AM Almita Valentine MD [facility] Internal Medicine [phone] [id number] Upcoming Health Maintenance Date Due DTaP/Tdap/Td series (1 - Tdap) 5/17/1962 ZOSTER VACCINE AGE 60> 3/17/2001 MEDICARE YEARLY EXAM 5/17/2006 GLAUCOMA SCREENING Q2Y 11/23/2012 COLONOSCOPY 4/20/2027 Allergies as of 1/30/2018  Review Complete On: 1/30/2018 By: Almita Valentine MD  
  
 Severity Noted Reaction Type Reactions Aggrastat [Tirofiban]  11/20/2017    Other (comments) Marvel Wellington. Current Immunizations  Reviewed on 2/4/2016 Name Date Influenza Vaccine Split 11/17/2011 Influenza Vaccine Whole 1/1/2007 ZZZ-RETIRED (DO NOT USE) Pneumococcal Vaccine (Unspecified Type) 1/13/2009 Not reviewed this visit You Were Diagnosed With   
  
 Codes Comments Chronic systolic congestive heart failure (HCC)    -  Primary ICD-10-CM: L18.64 ICD-9-CM: 428.22, 428.0 Atherosclerosis of native coronary artery of native heart without angina pectoris     ICD-10-CM: I25.10 ICD-9-CM: 414.01 Drug-induced gout, unspecified chronicity, unspecified site     ICD-10-CM: M10.20 ICD-9-CM: 274.9, E980.5 Vitamin D deficiency disease     ICD-10-CM: E55.9 ICD-9-CM: 268.9 Mixed hyperlipidemia     ICD-10-CM: E78.2 ICD-9-CM: 272.2 Essential hypertension     ICD-10-CM: I10 
ICD-9-CM: 401.9 Vitals BP Pulse Temp Resp Height(growth percentile) Weight(growth percentile) 158/82 (!) 57 98 °F (36.7 °C) (Oral) 14 6' (1.829 m) 240 lb (108.9 kg) SpO2 BMI Smoking Status 95% 32.55 kg/m2 Former Smoker Vitals History BMI and BSA Data Body Mass Index Body Surface Area  32.55 kg/m 2 2.35 m 2  
 Preferred Pharmacy Pharmacy Name Phone [facility] [address], [address] [place] [address] Avenue [phone] Your Updated Medication List  
  
   
This list is accurate as of: 1/30/18  9:04 AM.  Always use your most recent med list.  
  
  
  
  
 allopurinol 300 mg tablet Commonly known as:  ZYLOPRIM  
TAKE ONE TABLET BY MOUTH DAILY  
  
 aspirin delayed-release 81 mg tablet Take 1 Tab by mouth daily. carvedilol 25 mg tablet Commonly known as:  COREG  
TAKE ONE TABLET BY MOUTH TWICE A DAY WITH MEALS  
  
 clopidogrel 75 mg Tab Commonly known as:  PLAVIX Take 1 Tab by mouth daily. colchicine 0.6 mg tablet Take 1 tablet by mouth two (2) times daily as needed. furosemide 40 mg tablet Commonly known as:  LASIX TAKE ONE TABLET BY MOUTH DAILY  
  
 GLUCOSAMINE-CONDROITIN-HERB182 PO Take  by mouth two (2) times a day. lisinopril 20 mg tablet Commonly known as:  Queen Jojo Take 1 Tab by mouth daily. methocarbamol 500 mg tablet Commonly known as:  ROBAXIN Take 1 tablet by mouth nightly as needed. MULTIVITAMIN PO Take  by mouth. niacin 1,000 mg Tb24 tab Commonly known as:  Weems Monks Take 1 Tab by mouth nightly. simvastatin 40 mg tablet Commonly known as:  ZOCOR  
TAKE ONE TABLET BY MOUTH AT BEDTIME  
  
 TYLENOL ARTHRITIS 650 mg Tber Generic drug:  acetaminophen Take 2 Tabs by mouth every six (6) hours as needed for Pain. VITAMIN D 2,000 unit Cap capsule Generic drug:  Cholecalciferol (Vitamin D3) Take  by mouth every other day. Prescriptions Sent to Pharmacy Refills  
 simvastatin (ZOCOR) 40 mg tablet 3 Sig: TAKE ONE TABLET BY MOUTH AT BEDTIME Class: Normal  
 [facility] #: [phone]  
 carvedilol (COREG) 25 mg tablet 3 Sig: TAKE ONE TABLET BY MOUTH TWICE A DAY WITH MEALS  Class: Normal  
 Pharmacy: [address] Ph #: [phone]  
 furosemide (LASIX) 40 mg tablet 3 Sig: TAKE ONE TABLET BY MOUTH DAILY Class: Normal  
 Pharmacy: [address] Ph #: [phone]  
 allopurinol (ZYLOPRIM) 300 mg tablet 3 Sig: TAKE ONE TABLET BY MOUTH DAILY Class: Normal  
 Pharmacy: [address] Ph #: [phone]  
 clopidogrel (PLAVIX) 75 mg tab 3 Sig: Take 1 Tab by mouth daily. Class: Normal  
 Pharmacy: [address] Ph #: [phone] Route: Oral  
 niacin (NIASPAN) 1,000 mg Tb24 tab 3 Sig: Take 1 Tab by mouth nightly. Class: Normal  
 Pharmacy: [address] Ph #: [phone] Route: Oral  
 lisinopril (PRINIVIL, ZESTRIL) 20 mg tablet 3 Sig: Take 1 Tab by mouth daily. Class: Normal  
 Pharmacy: [address] Ph #: [phone] Route: Oral  
  
We Performed the Following CBC WITH AUTOMATED DIFF [62701 CPT(R)] LIPID PANEL [32602 CPT(R)] METABOLIC PANEL, COMPREHENSIVE [17230 CPT(R)] URIC ACID [id number] CPT(R)] Patient Instructions Please weight daily and record readings. Call for weight gain of 3 pounds in one day or 5 pounds in one week. Introducing [facility]! Dear Autumn Contreras: Thank you for requesting a [employer] account. Our records indicate that you have previously registered for a [employer] account but its currently inactive. Please call our [employer] support line at [phone]. Additional Information If you have questions, please visit the Frequently Asked Questions section of the [employer] website at [email or web address] Remember, [employer] is NOT to be used for urgent needs. For medical emergencies, dial 911. Now available from your iPhone and Android! Please provide this summary of care documentation to your next provider. Your primary care clinician is listed as Joseph [phone] If you have any questions after today's visit, please call [phone].

## 2018-01-31 ENCOUNTER — TELEPHONE (OUTPATIENT)
Dept: CARDIAC REHAB | Age: 78
End: 2018-01-31

## 2018-01-31 LAB
ALBUMIN SERPL-MCNC: 4.3 G/DL (ref 3.5–4.8)
ALBUMIN/GLOB SERPL: 2 {RATIO} (ref 1.2–2.2)
ALP SERPL-CCNC: 108 IU/L (ref 39–117)
ALT SERPL-CCNC: 15 IU/L (ref 0–44)
AST SERPL-CCNC: 19 IU/L (ref 0–40)
BASOPHILS # BLD AUTO: 0 X10E3/UL (ref 0–0.2)
BASOPHILS NFR BLD AUTO: 1 %
BILIRUB SERPL-MCNC: 0.9 MG/DL (ref 0–1.2)
BUN SERPL-MCNC: 16 MG/DL (ref 8–27)
BUN/CREAT SERPL: 14 (ref 10–24)
CALCIUM SERPL-MCNC: 9.1 MG/DL (ref 8.6–10.2)
CHLORIDE SERPL-SCNC: 103 MMOL/L (ref 96–106)
CHOLEST SERPL-MCNC: 124 MG/DL (ref 100–199)
CO2 SERPL-SCNC: 27 MMOL/L (ref 18–29)
CREAT SERPL-MCNC: 1.17 MG/DL (ref 0.76–1.27)
EOSINOPHIL # BLD AUTO: 0.2 X10E3/UL (ref 0–0.4)
EOSINOPHIL NFR BLD AUTO: 4 %
ERYTHROCYTE [DISTWIDTH] IN BLOOD BY AUTOMATED COUNT: 16.4 % (ref 12.3–15.4)
GFR SERPLBLD CREATININE-BSD FMLA CKD-EPI: 60 ML/MIN/1.73
GFR SERPLBLD CREATININE-BSD FMLA CKD-EPI: 70 ML/MIN/1.73
GLOBULIN SER CALC-MCNC: 2.2 G/DL (ref 1.5–4.5)
GLUCOSE SERPL-MCNC: 108 MG/DL (ref 65–99)
HCT VFR BLD AUTO: 45.4 % (ref 37.5–51)
HDLC SERPL-MCNC: 32 MG/DL
HGB BLD-MCNC: 15.4 G/DL (ref 13–17.7)
IMM GRANULOCYTES # BLD: 0 X10E3/UL (ref 0–0.1)
IMM GRANULOCYTES NFR BLD: 0 %
LDLC SERPL CALC-MCNC: 59 MG/DL (ref 0–99)
LYMPHOCYTES # BLD AUTO: 0.9 X10E3/UL (ref 0.7–3.1)
LYMPHOCYTES NFR BLD AUTO: 14 %
MCH RBC QN AUTO: 29.4 PG (ref 26.6–33)
MCHC RBC AUTO-ENTMCNC: 33.9 G/DL (ref 31.5–35.7)
MCV RBC AUTO: 87 FL (ref 79–97)
MONOCYTES # BLD AUTO: 0.8 X10E3/UL (ref 0.1–0.9)
MONOCYTES NFR BLD AUTO: 13 %
NEUTROPHILS # BLD AUTO: 4.3 X10E3/UL (ref 1.4–7)
NEUTROPHILS NFR BLD AUTO: 68 %
PLATELET # BLD AUTO: 141 X10E3/UL (ref 150–379)
POTASSIUM SERPL-SCNC: 4 MMOL/L (ref 3.5–5.2)
PROT SERPL-MCNC: 6.5 G/DL (ref 6–8.5)
RBC # BLD AUTO: 5.23 X10E6/UL (ref 4.14–5.8)
SODIUM SERPL-SCNC: 146 MMOL/L (ref 134–144)
TRIGL SERPL-MCNC: 165 MG/DL (ref 0–149)
URATE SERPL-MCNC: 5.4 MG/DL (ref 3.7–8.6)
VLDLC SERPL CALC-MCNC: 33 MG/DL (ref 5–40)
WBC # BLD AUTO: 6.3 X10E3/UL (ref 3.4–10.8)

## 2018-01-31 NOTE — TELEPHONE ENCOUNTER
1/31/2018 Cardiac Rehab: Call placed to Abimbola Charles. Unable to leave a message will follow-up the week of 2/2/18. Keesha Coughlin RN    1/3/2018 Cardiac Rehab: Called Mr. Abimbola Charles  to discuss participation in the Cardiac Rehab Program following shf on Nov. 2017. Spoke with pt. - he has postponed the pacemaker insertion and \"everything else\" due to his wife's hospitalization. Agreed to a f/u call the week of 1/29/2018. Vicente Nino RN    12/29/2017 Cardiac Rehab: Called an spoke with Abimbola Charles. He is coming in on 1/3/18 for a \"pacemaker\". He will need to wait until he recovers from the pacemaker insertion. Will follow after insertion for enrollment. Keesha Coughlin RN     [id number]/[id number] EKLL[facility] Rehab: Called Mr. Rakesh FLOR Mcmullen  to discuss participation in the Cardiac Rehab Program following an admission for [facility] 11/20/17. Left voicemail message. Will f/u 12/29/17.  Ranulfo Park RN

## 2018-04-27 LAB — CREATININE, EXTERNAL: 1.23

## 2019-01-24 ENCOUNTER — OFFICE VISIT (OUTPATIENT)
Dept: INTERNAL MEDICINE CLINIC | Age: 79
End: 2019-01-24

## 2019-01-24 ENCOUNTER — HOSPITAL ENCOUNTER (OUTPATIENT)
Dept: LAB | Age: 79
Discharge: HOME | End: 2019-01-24
Payer: MEDICARE

## 2019-01-24 VITALS
DIASTOLIC BLOOD PRESSURE: 80 MMHG | HEIGHT: 72 IN | SYSTOLIC BLOOD PRESSURE: 140 MMHG | BODY MASS INDEX: 31.97 KG/M2 | WEIGHT: 236 LBS | OXYGEN SATURATION: 95 % | TEMPERATURE: 98 F | HEART RATE: 83 BPM | RESPIRATION RATE: 16 BRPM

## 2019-01-24 DIAGNOSIS — M1A.29X0 DRUG-INDUCED CHRONIC GOUT OF MULTIPLE SITES WITHOUT TOPHUS: ICD-10-CM

## 2019-01-24 DIAGNOSIS — I10 ESSENTIAL HYPERTENSION: ICD-10-CM

## 2019-01-24 DIAGNOSIS — Z00.00 MEDICARE ANNUAL WELLNESS VISIT, SUBSEQUENT: Primary | ICD-10-CM

## 2019-01-24 DIAGNOSIS — E78.2 MIXED HYPERLIPIDEMIA: ICD-10-CM

## 2019-01-24 DIAGNOSIS — I25.10 ATHEROSCLEROSIS OF NATIVE CORONARY ARTERY OF NATIVE HEART WITHOUT ANGINA PECTORIS: ICD-10-CM

## 2019-01-24 DIAGNOSIS — Z12.5 PROSTATE CANCER SCREENING: ICD-10-CM

## 2019-01-24 DIAGNOSIS — Z13.5 GLAUCOMA SCREENING: ICD-10-CM

## 2019-01-24 DIAGNOSIS — I50.22 CHRONIC SYSTOLIC CONGESTIVE HEART FAILURE (HCC): ICD-10-CM

## 2019-01-24 DIAGNOSIS — E55.9 VITAMIN D DEFICIENCY DISEASE: ICD-10-CM

## 2019-01-24 PROBLEM — Z95.0 S/P PLACEMENT OF CARDIAC PACEMAKER: Status: ACTIVE | Noted: 2019-01-24

## 2019-01-24 PROCEDURE — 84153 ASSAY OF PSA TOTAL: CPT

## 2019-01-24 PROCEDURE — 84443 ASSAY THYROID STIM HORMONE: CPT

## 2019-01-24 PROCEDURE — 80053 COMPREHEN METABOLIC PANEL: CPT

## 2019-01-24 PROCEDURE — 80061 LIPID PANEL: CPT

## 2019-01-24 PROCEDURE — 85025 COMPLETE CBC W/AUTO DIFF WBC: CPT

## 2019-01-24 PROCEDURE — 36415 COLL VENOUS BLD VENIPUNCTURE: CPT

## 2019-01-24 PROCEDURE — 82306 VITAMIN D 25 HYDROXY: CPT

## 2019-01-24 PROCEDURE — 84550 ASSAY OF BLOOD/URIC ACID: CPT

## 2019-01-24 NOTE — PROGRESS NOTES
This is the Subsequent Medicare Annual Wellness Exam, performed 12 months or more after the Initial AWV or the last Subsequent AWV I have reviewed the patient's medical history in detail and updated the computerized patient record. As well as for a follow-up visit for his health issues. He is been generally doing great. Has seen the cardiology MD for followup. Denies any headaches or dizziness. No chest pains or shortness of breath. No PND or orthopnea. He has not had any episodes of gout recently. History Past Medical History:  
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  
 Heart failure ([facility] [address]  Ischemic cardiomyopathy  Long term (current) use of antithrombotics/antiplatelets  Mixed hyperlipidemia 8/20/2010 Past Surgical History:  
Procedure Laterality Date  BIOPSY PROSTATE  CARDIAC SURG PROCEDURE UNLIST    
 cabg  HX CHOLECYSTECTOMY  HX CORONARY ARTERY BYPASS GRAFT  12/26/06 Current Outpatient Medications Medication Sig Dispense Refill  varicella-zoster recombinant, PF, (SHINGRIX, PF,) 50 mcg/0.5 mL susr injection 0.5 mL by IntraMUSCular route once for 1 dose. 0.5 mL 1  diphtheria-pertussis, acellular,-tetanus (BOOSTRIX TDAP) 2.5-8-5 Lf-mcg-Lf/0.5mL susp susp 0.5 mL by IntraMUSCular route once for 1 dose. 0.5 mL 0  
 simvastatin (ZOCOR) 40 mg tablet TAKE ONE TABLET BY MOUTH AT BEDTIME 90 Tab 3  carvedilol (COREG) 25 mg tablet TAKE ONE TABLET BY MOUTH TWICE A DAY WITH MEALS 180 Tab 3  furosemide (LASIX) 40 mg tablet TAKE ONE TABLET BY MOUTH DAILY 90 Tab 3  
 allopurinol (ZYLOPRIM) 300 mg tablet TAKE ONE TABLET BY MOUTH DAILY 90 Tab 3  clopidogrel (PLAVIX) 75 mg tab Take 1 Tab by mouth daily. 90 Tab 3  
 lisinopril (PRINIVIL, ZESTRIL) 20 mg tablet Take 1 Tab by mouth daily.  90 Tab 3  
  GLUCOSAMINE-CONDROITIN-HERB182 PO Take  by mouth two (2) times a day.  methocarbamol (ROBAXIN) 500 mg tablet Take 1 tablet by mouth nightly as needed. 20 tablet 0  
 colchicine 0.6 mg tablet Take 1 tablet by mouth two (2) times daily as needed. 180 tablet 3  
 aspirin delayed-release 81 mg tablet Take 1 Tab by mouth daily.  acetaminophen (TYLENOL ARTHRITIS) 650 mg CR tablet Take 2 Tabs by mouth every six (6) hours as needed for Pain.  MULTIVITAMIN PO Take  by mouth.  Cholecalciferol, Vitamin D3, (VITAMIN D) 2,000 unit Cap Take  by mouth every other day.  niacin (NIASPAN) 1,000 mg Tb24 tab Take 1 Tab by mouth nightly. 90 Tab 3 Allergies Allergen Reactions  Aggrastat [Tirofiban] Other (comments) Lanney Hammans. Family History Problem Relation Age of Onset  Heart Disease Mother Social History Tobacco Use  Smoking status: Former Smoker Years: 30.00 Types: Cigarettes Last attempt to quit: 1997 Years since quittin.0  Smokeless tobacco: Never Used Substance Use Topics  Alcohol use: No  
  Alcohol/week: 0.0 oz Patient Active Problem List  
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 29X0  Hypertension I10  Vitamin D deficiency disease E55.9  CHF (congestive heart failure) (HCC) I50.9  Advance directive discussed with patient Z70.80  Influenza vaccine refused Z28.21  
 Colonoscopy refused Z53.20  
ROS - Per HPI Physical Examination: General appearance - alert, well appearing, and in no distress Ears - bilateral TM's and external ear canals normal 
Nose - normal and patent, no erythema, discharge or polyps Mouth - mucous membranes moist, pharynx normal without lesions Neck - supple, no significant adenopathy Lymphatics - no palpable lymphadenopathy, no hepatosplenomegaly Chest - clear to auscultation, no wheezes, rales or rhonchi, symmetric air entry Heart - normal rate and regular rhythm Abdomen - soft, nontender, nondistended, no masses or organomegaly Back exam - full range of motion, no tenderness, palpable spasm or pain on motion Neurological - alert, oriented, normal speech, no focal findings or movement disorder noted Musculoskeletal - no joint tenderness, deformity or swelling Extremities - peripheral pulses normal, no pedal edema, no clubbing or cyanosis Depression Risk Factor Screening: PHQ over the last two weeks 1/24/2019 Little interest or pleasure in doing things Not at all Feeling down, depressed, irritable, or hopeless Not at all Total Score PHQ 2 0 Alcohol Risk Factor Screening: You do not drink alcohol or very rarely. Functional Ability and Level of Safety:  
Hearing Loss Hearing is good. Activities of Daily Living The home contains: no safety equipment. Patient does total self care Fall Risk Fall Risk Assessment, last 12 mths 1/24/2019 Able to walk? Yes Fall in past 12 months? No  
 
 
Abuse Screen Patient is not abused Cognitive Screening Evaluation of Cognitive Function: 
Has your family/caregiver stated any concerns about your memory: no 
 
 
Patient Care Team  
Patient Care Team: 
Ermias Simmons MD as PCP - Rosa Gregorio MD (Cardiology) Assessment/Plan Education and counseling provided: 
Are appropriate based on today's review and evaluation End-of-Life planning (with patient's consent) Influenza Vaccine Prostate cancer screening tests (PSA, covered annually) Diabetes screening test 
 
Diagnoses and all orders for this visit: 
 
1. Mixed hyperlipidemia -LDL goal of 100. He is tolerating statin drugs well. We will continue them for now.  
 
2. Atherosclerosis of native coronary artery of native heart without angina pectoris -per cardiology. 
-     LIPID PANEL 
 
 3. Essential hypertension -blood pressure well controlled. Will continue to monitor. 
-     CBC WITH AUTOMATED DIFF 
-     METABOLIC PANEL, COMPREHENSIVE 
-     TSH RFX ON ABNORMAL TO FREE T4 
 
4. 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 We will continue to monitor uric acid. -     URIC ACID 5. Vitamin D deficiency disease 
-     VITAMIN D, 25 HYDROXY 6. Chronic systolic congestive heart failure (HCC)-suggested daily weights. He will also continue to monitor his sodium intake. 7. Glaucoma screening 
-     REFERRAL TO OPHTHALMOLOGY 8. Prostate cancer screening -     PSA SCREENING (SCREENING) 9. Medicare annual wellness visit, subsequent Other orders 
-     varicella-zoster recombinant, PF, (SHINGRIX, PF,) 50 mcg/0.5 mL susr injection; 0.5 mL by IntraMUSCular route once for 1 dose. 
-     diphtheria-pertussis, acellular,-tetanus (BOOSTRIX TDAP) 2.5-8-5 Lf-mcg-Lf/0.5mL susp susp; 0.5 mL by IntraMUSCular route once for 1 dose. Health Maintenance Due Topic Date Due  
 DTaP/Tdap/Td series (1 - Tdap) 05/17/1962  Shingrix Vaccine Age 50> (1 of 2) 05/17/1991  GLAUCOMA SCREENING Q2Y  11/23/2012  MEDICARE YEARLY EXAM  10/29/2018

## 2019-01-24 NOTE — PATIENT INSTRUCTIONS
As discussed in your appointment today, [address] is an important part of planning for your healthcare future. Discussing your preferences with your family and your care team is a part of good healthcare so that we can be guided by your known values and goals. Our office offers this service for you at your convenience. Our Nurse Navigators and certified [employer] Facilitators, Won Rutledge and Heladio Suarez typically schedule family appointments for this service on Wednesdays. To schedule an Advance Care Planning visit or to receive more information about this service, please call Via [employer] [facility] Internal Medicine at [phone] and ask to speak directly to Al Bonilla or [employer]. Advance Care Planning: Care Instructions Your Care Instructions It can be hard to live with an illness that cannot be cured. But if your health is getting worse, you may want to make decisions about end-of-life care. Planning for the end of your life does not mean that you are giving up. It is a way to make sure that your wishes are met. Clearly stating your wishes can make it easier for your loved ones. Making plans while you are still able may also ease your mind and make your final days less stressful and more meaningful. Follow-up care is a key part of your treatment and safety. Be sure to make and go to all appointments, and call your doctor if you are having problems. It's also a good idea to know your test results and keep a list of the medicines you take. What can you do to plan for the end of life? · You can bring these issues up with your doctor. You do not need to wait until your doctor starts the conversation. You might start with \"I would not be willing to live with . [place] Karl [place] \" When you complete this sentence it helps your doctor understand your wishes. · Talk openly and honestly with your doctor. This is the best way to understand the decisions you will need to make as your health changes. Know that you can always change your mind. · Ask your doctor about commonly used life-support measures. These include tube feedings, breathing machines, and fluids given through a vein (IV). Understanding these treatments will help you decide whether you want them. · You may choose to have these life-supporting treatments for a limited time. This allows a trial period to see whether they will help you. You may also decide that you want your doctor to take only certain measures to keep you alive. It is important to spell out these conditions so that your doctor and family understand them. · Talk to your doctor about how long you are likely to live. He or she may be able to give you an idea of what usually happens with your specific illness. · Think about preparing papers that state your wishes. This way there will not be any confusion about what you want. You can change your instructions at any time. Which papers should you prepare? Advance directives are legal papers that tell doctors how you want to be cared for at the end of your life. You do not need a  to write these papers. Ask your doctor or your state health department for information on how to write your advance directives. They may have the forms for each of these types of papers. Make sure your doctor has a copy of these on file, and give a copy to a family member or close friend. · Consider a do-not-resuscitate order (DNR). This order asks that no extra treatments be done if your heart stops or you stop breathing. Extra treatments may include electrical shock to restart your heart or a machine to breathe for you. If you decide to have a DNR order, ask your doctor to explain and write it. Place the order in your home where everyone can easily see it. · Consider a living will. A living will explains your wishes in case you are in a coma or cannot communicate.  Living neves tell doctors to use or not use treatments that would keep you alive. You must have one or two witnesses or a notary present when you sign this form. · Consider a durable power of . This allows you to name a person to make decisions about your care if you are not able to. Most people ask a close friend or family member. Talk to this person about the kinds of treatments you want and those that you do not want. Make sure this person understands your wishes. If this person is not the health care agent named in your advance directive, also talk with your health care agent. These legal papers are simple to change. Tell your doctor what you want to change, and ask him or her to make a note in your medical file. Give your family updated copies of the papers. Medicare Wellness Visit, Male The best way to live healthy is to have a lifestyle where you eat a well-balanced diet, exercise regularly, limit alcohol use, and quit all forms of tobacco/nicotine, if applicable. Regular preventive services are another way to keep healthy. Preventive services (vaccines, screening tests, monitoring & exams) can help personalize your care plan, which helps you manage your own care. Screening tests can find health problems at the earliest stages, when they are easiest to treat. [facility] follows the current, evidence-based guidelines published by the [place] [employer] (USPSTF) when recommending preventive services for our patients. Because we follow these guidelines, sometimes recommendations change over time as research supports it. (For example, a prostate screening blood test is no longer routinely recommended for men with no symptoms.) Of course, you and your doctor may decide to screen more often for some diseases, based on your risk and co-morbidities (chronic disease you are already diagnosed with). Preventive services for you include: - Medicare offers their members a free annual wellness visit, which is time for you and your primary care provider to discuss and plan for your preventive service needs. Take advantage of this benefit every year! 
-All adults over age 72 should receive the recommended pneumonia vaccines. Current USPSTF guidelines recommend a series of two vaccines for the best pneumonia protection.  
-All adults should have a flu vaccine yearly and an ECG. All adults age 61 and older should receive a shingles vaccine once in their lifetime.   
-All adults age 38-68 who are overweight should have a diabetes screening test once every three years.  
-Other screening tests & preventive services for persons with diabetes include: an eye exam to screen for diabetic retinopathy, a kidney function test, a foot exam, and stricter control over your cholesterol.  
-Cardiovascular screening for adults with routine risk involves an electrocardiogram (ECG) at intervals determined by the provider.  
-Colorectal cancer screening should be done for adults age 54-65 with no increased risk factors for colorectal cancer. There are a number of acceptable methods of screening for this type of cancer. Each test has its own benefits and drawbacks. Discuss with your provider what is most appropriate for you during your annual wellness visit. The different tests include: colonoscopy (considered the best screening method), a fecal occult blood test, a fecal DNA test, and sigmoidoscopy. 
-All adults born between [facility] should be screened once for Hepatitis C. 
-An Abdominal Aortic Aneurysm (AAA) Screening is recommended for men age 73-68 who has ever smoked in their lifetime. Here is a list of your current Health Maintenance items (your personalized list of preventive services) with a due date: 
Health Maintenance Due Topic Date Due  
 DTaP/Tdap/Td  (1 - Tdap) 05/17/1962  Shingles Vaccine (1 of 2) 05/17/1991  Glaucoma Screening   11/23/2012 [facility] Annual Well Visit  10/29/2018

## 2019-01-24 NOTE — ACP (ADVANCE CARE PLANNING)
Advance Medical Directives discussed with patient.       -Patient states that they DO NOT have an advance directive in place at this time. Information has been provided to patient on obtaining neccessary documents.

## 2019-01-25 DIAGNOSIS — M10.20 DRUG-INDUCED GOUT, UNSPECIFIED CHRONICITY, UNSPECIFIED SITE: ICD-10-CM

## 2019-01-25 LAB
25(OH)D3+25(OH)D2 SERPL-MCNC: 42 NG/ML (ref 30–100)
ALBUMIN SERPL-MCNC: 4.2 G/DL (ref 3.5–4.8)
ALBUMIN/GLOB SERPL: 1.9 {RATIO} (ref 1.2–2.2)
ALP SERPL-CCNC: 110 IU/L (ref 39–117)
ALT SERPL-CCNC: 17 IU/L (ref 0–44)
AST SERPL-CCNC: 22 IU/L (ref 0–40)
BASOPHILS # BLD AUTO: 0 X10E3/UL (ref 0–0.2)
BASOPHILS NFR BLD AUTO: 0 %
BILIRUB SERPL-MCNC: 0.8 MG/DL (ref 0–1.2)
BUN SERPL-MCNC: 15 MG/DL (ref 8–27)
BUN/CREAT SERPL: 14 (ref 10–24)
CALCIUM SERPL-MCNC: 9.2 MG/DL (ref 8.6–10.2)
CHLORIDE SERPL-SCNC: 106 MMOL/L (ref 96–106)
CHOLEST SERPL-MCNC: 115 MG/DL (ref 100–199)
CO2 SERPL-SCNC: 24 MMOL/L (ref 20–29)
CREAT SERPL-MCNC: 1.08 MG/DL (ref 0.76–1.27)
EOSINOPHIL # BLD AUTO: 0.2 X10E3/UL (ref 0–0.4)
EOSINOPHIL NFR BLD AUTO: 4 %
ERYTHROCYTE [DISTWIDTH] IN BLOOD BY AUTOMATED COUNT: 15.5 % (ref 12.3–15.4)
GLOBULIN SER CALC-MCNC: 2.2 G/DL (ref 1.5–4.5)
GLUCOSE SERPL-MCNC: 113 MG/DL (ref 65–99)
HCT VFR BLD AUTO: 44.9 % (ref 37.5–51)
HDLC SERPL-MCNC: 30 MG/DL
HGB BLD-MCNC: 14.5 G/DL (ref 13–17.7)
IMM GRANULOCYTES # BLD AUTO: 0 X10E3/UL (ref 0–0.1)
IMM GRANULOCYTES NFR BLD AUTO: 0 %
LDLC SERPL CALC-MCNC: 62 MG/DL (ref 0–99)
LYMPHOCYTES # BLD AUTO: 0.9 X10E3/UL (ref 0.7–3.1)
LYMPHOCYTES NFR BLD AUTO: 15 %
MCH RBC QN AUTO: 29.1 PG (ref 26.6–33)
MCHC RBC AUTO-ENTMCNC: 32.3 G/DL (ref 31.5–35.7)
MCV RBC AUTO: 90 FL (ref 79–97)
MONOCYTES # BLD AUTO: 0.7 X10E3/UL (ref 0.1–0.9)
MONOCYTES NFR BLD AUTO: 13 %
NEUTROPHILS # BLD AUTO: 3.9 X10E3/UL (ref 1.4–7)
NEUTROPHILS NFR BLD AUTO: 68 %
PLATELET # BLD AUTO: 121 X10E3/UL (ref 150–379)
POTASSIUM SERPL-SCNC: 4.2 MMOL/L (ref 3.5–5.2)
PROT SERPL-MCNC: 6.4 G/DL (ref 6–8.5)
PSA SERPL-MCNC: 3 NG/ML (ref 0–4)
RBC # BLD AUTO: 4.99 X10E6/UL (ref 4.14–5.8)
SODIUM SERPL-SCNC: 148 MMOL/L (ref 134–144)
TRIGL SERPL-MCNC: 116 MG/DL (ref 0–149)
TSH SERPL DL<=0.005 MIU/L-ACNC: 0.59 UIU/ML (ref 0.45–4.5)
URATE SERPL-MCNC: 5.5 MG/DL (ref 3.7–8.6)
VLDLC SERPL CALC-MCNC: 23 MG/DL (ref 5–40)
WBC # BLD AUTO: 5.7 X10E3/UL (ref 3.4–10.8)

## 2019-01-25 RX ORDER — ALLOPURINOL 300 MG/1
TABLET ORAL
Qty: 90 TAB | Refills: 2 | Status: SHIPPED | OUTPATIENT
Start: 2019-01-25 | End: 2019-10-27 | Stop reason: SDUPTHER

## 2019-02-06 RX ORDER — LISINOPRIL 20 MG/1
TABLET ORAL
Qty: 90 TAB | Refills: 2 | Status: SHIPPED | OUTPATIENT
Start: 2019-02-06 | End: 2019-10-27 | Stop reason: SDUPTHER

## 2019-03-02 DIAGNOSIS — I25.10 ATHEROSCLEROSIS OF NATIVE CORONARY ARTERY OF NATIVE HEART WITHOUT ANGINA PECTORIS: ICD-10-CM

## 2019-03-03 RX ORDER — CLOPIDOGREL BISULFATE 75 MG/1
TABLET ORAL
Qty: 90 TAB | Refills: 2 | Status: SHIPPED | OUTPATIENT
Start: 2019-03-03 | End: 2019-12-11 | Stop reason: SDUPTHER

## 2019-03-19 DIAGNOSIS — I25.10 ATHEROSCLEROSIS OF NATIVE CORONARY ARTERY OF NATIVE HEART WITHOUT ANGINA PECTORIS: ICD-10-CM

## 2019-03-19 RX ORDER — FUROSEMIDE 40 MG/1
TABLET ORAL
Qty: 90 TAB | Refills: 2 | Status: SHIPPED | OUTPATIENT
Start: 2019-03-19 | End: 2020-01-02

## 2019-03-30 DIAGNOSIS — I25.10 ATHEROSCLEROSIS OF NATIVE CORONARY ARTERY OF NATIVE HEART WITHOUT ANGINA PECTORIS: ICD-10-CM

## 2019-03-31 RX ORDER — SIMVASTATIN 40 MG/1
TABLET, FILM COATED ORAL
Qty: 90 TAB | Refills: 2 | Status: SHIPPED | OUTPATIENT
Start: 2019-03-31 | End: 2020-01-02

## 2019-03-31 RX ORDER — CARVEDILOL 25 MG/1
TABLET ORAL
Qty: 180 TAB | Refills: 2 | Status: SHIPPED | OUTPATIENT
Start: 2019-03-31 | End: 2020-01-02

## 2019-10-27 DIAGNOSIS — M10.20 DRUG-INDUCED GOUT, UNSPECIFIED CHRONICITY, UNSPECIFIED SITE: ICD-10-CM

## 2019-10-27 RX ORDER — ALLOPURINOL 300 MG/1
TABLET ORAL
Qty: 90 TAB | Refills: 1 | Status: SHIPPED | OUTPATIENT
Start: 2019-10-27 | End: 2020-01-16 | Stop reason: SDUPTHER

## 2019-10-27 RX ORDER — LISINOPRIL 20 MG/1
TABLET ORAL
Qty: 90 TAB | Refills: 1 | Status: SHIPPED | OUTPATIENT
Start: 2019-10-27 | End: 2020-01-16 | Stop reason: SDUPTHER

## 2019-12-11 DIAGNOSIS — I25.10 ATHEROSCLEROSIS OF NATIVE CORONARY ARTERY OF NATIVE HEART WITHOUT ANGINA PECTORIS: ICD-10-CM

## 2019-12-11 RX ORDER — CLOPIDOGREL BISULFATE 75 MG/1
TABLET ORAL
Qty: 90 TAB | Refills: 1 | Status: SHIPPED | OUTPATIENT
Start: 2019-12-11 | End: 2020-01-16 | Stop reason: SDUPTHER

## 2020-01-01 DIAGNOSIS — I25.10 ATHEROSCLEROSIS OF NATIVE CORONARY ARTERY OF NATIVE HEART WITHOUT ANGINA PECTORIS: ICD-10-CM

## 2020-01-02 RX ORDER — SIMVASTATIN 40 MG/1
TABLET, FILM COATED ORAL
Qty: 90 TAB | Refills: 0 | Status: SHIPPED | OUTPATIENT
Start: 2020-01-02 | End: 2020-01-16 | Stop reason: SDUPTHER

## 2020-01-02 RX ORDER — CARVEDILOL 25 MG/1
TABLET ORAL
Qty: 180 TAB | Refills: 0 | Status: SHIPPED | OUTPATIENT
Start: 2020-01-02 | End: 2020-01-16 | Stop reason: SDUPTHER

## 2020-01-02 RX ORDER — FUROSEMIDE 40 MG/1
TABLET ORAL
Qty: 90 TAB | Refills: 0 | Status: SHIPPED | OUTPATIENT
Start: 2020-01-02 | End: 2020-01-16 | Stop reason: SDUPTHER

## 2020-01-02 NOTE — TELEPHONE ENCOUNTER
Orders Placed This Encounter    furosemide (LASIX) 40 mg tablet     Sig: TAKE ONE TABLET BY MOUTH DAILY     Dispense:  90 Tab     Refill:  0    carvedilol (COREG) 25 mg tablet     Sig: TAKE ONE TABLET BY MOUTH TWICE A DAY WITH MEALS     Dispense:  180 Tab     Refill:  0    simvastatin (ZOCOR) 40 mg tablet     Sig: TAKE ONE TABLET BY MOUTH EVERY NIGHT AT BEDTIME     Dispense:  90 Tab     Refill:  0     The above orders were approved via [facility] per Dr. La Saleem, III.

## 2020-01-16 ENCOUNTER — OFFICE VISIT (OUTPATIENT)
Dept: INTERNAL MEDICINE CLINIC | Age: 80
End: 2020-01-16

## 2020-01-16 ENCOUNTER — HOSPITAL ENCOUNTER (OUTPATIENT)
Dept: LAB | Age: 80
Discharge: HOME | End: 2020-01-16
Payer: MEDICARE

## 2020-01-16 VITALS
BODY MASS INDEX: 31.15 KG/M2 | DIASTOLIC BLOOD PRESSURE: 72 MMHG | SYSTOLIC BLOOD PRESSURE: 142 MMHG | HEIGHT: 72 IN | OXYGEN SATURATION: 98 % | TEMPERATURE: 98.2 F | WEIGHT: 230 LBS | RESPIRATION RATE: 16 BRPM | HEART RATE: 68 BPM

## 2020-01-16 DIAGNOSIS — M10.20 DRUG-INDUCED GOUT, UNSPECIFIED CHRONICITY, UNSPECIFIED SITE: ICD-10-CM

## 2020-01-16 DIAGNOSIS — I25.10 ATHEROSCLEROSIS OF NATIVE CORONARY ARTERY OF NATIVE HEART WITHOUT ANGINA PECTORIS: ICD-10-CM

## 2020-01-16 DIAGNOSIS — Z13.5 GLAUCOMA SCREENING: Primary | ICD-10-CM

## 2020-01-16 DIAGNOSIS — E78.2 MIXED HYPERLIPIDEMIA: ICD-10-CM

## 2020-01-16 DIAGNOSIS — Z12.5 PROSTATE CANCER SCREENING: ICD-10-CM

## 2020-01-16 DIAGNOSIS — I10 ESSENTIAL HYPERTENSION: ICD-10-CM

## 2020-01-16 DIAGNOSIS — M1A.29X0 DRUG-INDUCED CHRONIC GOUT OF MULTIPLE SITES WITHOUT TOPHUS: ICD-10-CM

## 2020-01-16 DIAGNOSIS — E55.9 VITAMIN D DEFICIENCY DISEASE: ICD-10-CM

## 2020-01-16 PROCEDURE — 84550 ASSAY OF BLOOD/URIC ACID: CPT

## 2020-01-16 PROCEDURE — 87086 URINE CULTURE/COLONY COUNT: CPT

## 2020-01-16 PROCEDURE — 84153 ASSAY OF PSA TOTAL: CPT

## 2020-01-16 PROCEDURE — 81001 URINALYSIS AUTO W/SCOPE: CPT

## 2020-01-16 RX ORDER — CLOPIDOGREL BISULFATE 75 MG/1
TABLET ORAL
Qty: 90 TAB | Refills: 3 | Status: SHIPPED | OUTPATIENT
Start: 2020-01-16 | End: 2021-02-21

## 2020-01-16 RX ORDER — FUROSEMIDE 40 MG/1
40 TABLET ORAL DAILY
Qty: 90 TAB | Refills: 3 | Status: SHIPPED | OUTPATIENT
Start: 2020-01-16 | End: 2021-01-21 | Stop reason: DRUGHIGH

## 2020-01-16 RX ORDER — CARVEDILOL 25 MG/1
25 TABLET ORAL 2 TIMES DAILY WITH MEALS
Qty: 180 TAB | Refills: 3 | Status: SHIPPED | OUTPATIENT
Start: 2020-01-16 | End: 2021-03-26

## 2020-01-16 RX ORDER — SILDENAFIL 100 MG/1
100 TABLET, FILM COATED ORAL AS NEEDED
Qty: 30 TAB | Refills: 4 | Status: SHIPPED | OUTPATIENT
Start: 2020-01-16 | End: 2021-01-21 | Stop reason: SDUPTHER

## 2020-01-16 RX ORDER — LISINOPRIL 20 MG/1
TABLET ORAL
Qty: 90 TAB | Refills: 3 | Status: SHIPPED | OUTPATIENT
Start: 2020-01-16 | End: 2021-01-24

## 2020-01-16 RX ORDER — SIMVASTATIN 40 MG/1
40 TABLET, FILM COATED ORAL
Qty: 90 TAB | Refills: 3 | Status: SHIPPED | OUTPATIENT
Start: 2020-01-16 | End: 2021-03-26

## 2020-01-16 RX ORDER — ALLOPURINOL 300 MG/1
300 TABLET ORAL DAILY
Qty: 90 TAB | Refills: 3 | Status: SHIPPED | OUTPATIENT
Start: 2020-01-16 | End: 2021-01-24

## 2020-01-16 NOTE — PATIENT INSTRUCTIONS
As discussed in your appointment today, [address] is an important part of planning for your healthcare future. Discussing your preferences with your family and your care team is a part of good healthcare so that we can be guided by your known values and goals. Our office offers this service for you at your convenience. To schedule an Advance Care Planning visit or to receive more information about this service, please call Via [employer] [facility] Internal Medicine at [phone] and ask to speak directly to one of our Nurse Navigators. Advance Care Planning: Care Instructions Your Care Instructions It can be hard to live with an illness that cannot be cured. But if your health is getting worse, you may want to make decisions about end-of-life care. Planning for the end of your life does not mean that you are giving up. It is a way to make sure that your wishes are met. Clearly stating your wishes can make it easier for your loved ones. Making plans while you are still able may also ease your mind and make your final days less stressful and more meaningful. Follow-up care is a key part of your treatment and safety. Be sure to make and go to all appointments, and call your doctor if you are having problems. It's also a good idea to know your test results and keep a list of the medicines you take. What can you do to plan for the end of life? · You can bring these issues up with your doctor. You do not need to wait until your doctor starts the conversation. You might start with \"I would not be willing to live with . [place] M[place] Mellow [place] Mellow \" When you complete this sentence it helps your doctor understand your wishes. · Talk openly and honestly with your doctor. This is the best way to understand the decisions you will need to make as your health changes. Know that you can always change your mind. · Ask your doctor about commonly used life-support measures.  These include tube feedings, breathing machines, and fluids given through a vein (IV). Understanding these treatments will help you decide whether you want them. · You may choose to have these life-supporting treatments for a limited time. This allows a trial period to see whether they will help you. You may also decide that you want your doctor to take only certain measures to keep you alive. It is important to spell out these conditions so that your doctor and family understand them. · Talk to your doctor about how long you are likely to live. He or she may be able to give you an idea of what usually happens with your specific illness. · Think about preparing papers that state your wishes. This way there will not be any confusion about what you want. You can change your instructions at any time. Which papers should you prepare? Advance directives are legal papers that tell doctors how you want to be cared for at the end of your life. You do not need a  to write these papers. Ask your doctor or your state health department for information on how to write your advance directives. They may have the forms for each of these types of papers. Make sure your doctor has a copy of these on file, and give a copy to a family member or close friend. · Consider a do-not-resuscitate order (DNR). This order asks that no extra treatments be done if your heart stops or you stop breathing. Extra treatments may include electrical shock to restart your heart or a machine to breathe for you. If you decide to have a DNR order, ask your doctor to explain and write it. Place the order in your home where everyone can easily see it. · Consider a living will. A living will explains your wishes in case you are in a coma or cannot communicate. Living neves tell doctors to use or not use treatments that would keep you alive. You must have one or two witnesses or a notary present when you sign this form. · Consider a durable power of . This allows you to name a person to make decisions about your care if you are not able to. Most people ask a close friend or family member. Talk to this person about the kinds of treatments you want and those that you do not want. Make sure this person understands your wishes. If this person is not the health care agent named in your advance directive, also talk with your health care agent. These legal papers are simple to change. Tell your doctor what you want to change, and ask him or her to make a note in your medical file. Give your family updated copies of the papers.

## 2020-01-16 NOTE — PROGRESS NOTES
HPI:  Yudith Pastor is a 67y.o. year old male who is here for a routine visit:    Presents for a follow-up visit. He has been generally doing very well. His weight is down intentionally. He denies any headaches. He is occasionally lightheaded on standing. No nosebleeds. No exertional chest pain. He does have some occasional chest wall pain after activity. He is up-to-date on visits with the cardiologist.  Denies any PND or orthopnea. Denies nausea or vomiting. Denies any change in bowel or bladder habits. He has some nocturia and some hesitancy of urine in the morning time. He does have ED issues and tolerated Viagra well in the past and would like to repeat that. 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 Authorizing Provider   varicella-zoster recombinant, PF, (SHINGRIX, PF,) 50 mcg/0.5 mL susr injection 0.5 mL by IntraMUSCular route once for 1 dose. 1/16/20 1/16/20 Yes Maria Del Rosario Seymour MD   diphtheria-pertussis, acellular,-tetanus (BOOSTRIX TDAP) 2.5-8-5 Lf-mcg-Lf/0.5mL susp susp 0.5 mL by IntraMUSCular route once for 1 dose. Indications: treatment to prevent diphtheria, pertussis and tetanus 1/16/20 1/16/20 Yes Maria Del Rosario Seymour MD   simvastatin (ZOCOR) 40 mg tablet Take 1 Tab by mouth nightly.  1/16/20  Yes Maria Del Rosario Seymour MD lisinopril (PRINIVIL, ZESTRIL) 20 mg tablet TAKE ONE TABLET BY MOUTH DAILY 1/16/20  Yes Shahriar Campos III, MD   furosemide (LASIX) 40 mg tablet Take 1 Tab by mouth daily. 1/16/20  Yes Conchis Solis MD   clopidogreL (PLAVIX) 75 mg tab TAKE ONE TABLET BY MOUTH DAILY 1/16/20  Yes Shahriar Campos III, MD   carvediloL (COREG) 25 mg tablet Take 1 Tab by mouth two (2) times daily (with meals). 1/16/20  Yes Conchis Solis MD   allopurinoL (ZYLOPRIM) 300 mg tablet Take 1 Tab by mouth daily. 1/16/20  Yes Conchis Solis MD   sildenafil citrate (VIAGRA) 100 mg tablet Take 1 Tab by mouth as needed for Erectile Dysfunction. 1/16/20  Yes Conchis Solis MD   [id number]-[id number] PO Take  by mouth two (2) times a day. Yes Provider, Historical   methocarbamol (ROBAXIN) 500 mg tablet Take 1 tablet by mouth nightly as needed. 12/10/14  Yes Conchis Solis MD   colchicine 0.6 mg tablet Take 1 tablet by mouth two (2) times daily as needed. 12/10/14  Yes Conchis Solis MD   aspirin delayed-release 81 mg tablet Take 1 Tab by mouth daily. 5/1/14  Yes Conchis Solis MD   acetaminophen (TYLENOL ARTHRITIS) 650 mg CR tablet Take 2 Tabs by mouth every six (6) hours as needed for Pain. 12/26/12  Yes Conchis Solis MD   MULTIVITAMIN PO Take  by mouth. Yes Provider, Historical   Cholecalciferol, Vitamin D3, (VITAMIN D) 2,000 unit Cap Take  by mouth every other day.    Yes Provider, Historical   furosemide (LASIX) 40 mg tablet TAKE ONE TABLET BY MOUTH DAILY 1/2/20 1/16/20  Shahriar Campos III, MD   carvedilol (COREG) 25 mg tablet TAKE ONE TABLET BY MOUTH TWICE A DAY WITH MEALS 1/2/20 1/16/20  Shahriar Campos III, MD   simvastatin (ZOCOR) 40 mg tablet TAKE ONE TABLET BY MOUTH EVERY NIGHT AT BEDTIME 1/2/20 1/16/20  Shahriar Campos III, MD   clopidogrel (PLAVIX) 75 mg tab TAKE ONE TABLET BY MOUTH DAILY 12/11/19 1/16/20  Shahriar Campos III, MD   allopurinol (ZYLOPRIM) 300 mg tablet TAKE ONE TABLET BY MOUTH DAILY 10/27/19 1/16/20  Alicia Tran III, MD   lisinopril (PRINIVIL, ZESTRIL) 20 mg tablet TAKE ONE TABLET BY MOUTH DAILY 10/27/19 1/16/20  Lise Burrell MD       Social History     Socioeconomic History    Marital status:      Spouse name: Not on file    Number of children: Not on file    Years of education: Not on file    Highest education level: Not on file   Occupational History    Not on file   Social Needs    Financial resource strain: Not on file    Food insecurity:     Worry: Not on file     Inability: Not on file    Transportation needs:     Medical: Not on file     Non-medical: Not on file   Tobacco Use    Smoking status: Former Smoker     Years: 30.00     Types: Cigarettes     Last attempt to quit: 1997     Years since quittin.0    Smokeless tobacco: Never Used   Substance and Sexual Activity    Alcohol use: No     Alcohol/week: 0.0 standard drinks    Drug use: No    Sexual activity: Yes     Partners: Female   Lifestyle    Physical activity:     Days per week: Not on file     Minutes per session: Not on file    Stress: Not on file   Relationships    Social connections:     Talks on phone: Not on file     Gets together: Not on file     Attends Hoah[employer]ism service: Not on file     Active member of club or organization: Not on file     Attends meetings of clubs or organizations: Not on file     Relationship status: Not on file    Intimate partner violence:     Fear of current or ex partner: Not on file     Emotionally abused: Not on file     Physically abused: Not on file     Forced sexual activity: Not on file   Other Topics Concern    Not on file   Social History Narrative    Not on file          ROS  Per HPI    Visit Vitals  /72   Pulse 68   Temp 98.2 °F (36.8 °C) (Oral)   Resp 16   Ht 6' (1.829 m)   Wt 230 lb (104.3 kg)   SpO2 98%   BMI 31.19 kg/m²         Physical Exam   Physical Examination: General appearance - alert, well appearing, and in no distress  Mouth - mucous membranes moist, pharynx normal without lesions  Neck - supple, no significant adenopathy  Lymphatics - no palpable lymphadenopathy, no hepatosplenomegaly  Chest - clear to auscultation, no wheezes, rales or rhonchi, symmetric air entry  Heart - normal rate and regular rhythm  Abdomen - soft, nontender, nondistended, no masses or organomegaly  Neurological - alert, oriented, normal speech, no focal findings or movement disorder noted  Musculoskeletal - no joint tenderness, deformity or swelling  Extremities - pedal edema 1 +, intact peripheral pulses      Assessment/Plan:  Diagnoses and all orders for this visit:    1. Glaucoma screening  -     REFERRAL TO OPHTHALMOLOGY    2. Atherosclerosis of native coronary artery of native heart without angina pectoris-stable. Continue follow-up with cardiology. Will check labs to be sure electrolytes are stable. -     CBC WITH AUTOMATED DIFF  -     METABOLIC PANEL, COMPREHENSIVE  -     LIPID PANEL  -     TSH RFX ON ABNORMAL TO FREE T4  -     simvastatin (ZOCOR) 40 mg tablet; Take 1 Tab by mouth nightly. -     furosemide (LASIX) 40 mg tablet; Take 1 Tab by mouth daily. -     clopidogreL (PLAVIX) 75 mg tab; TAKE ONE TABLET BY MOUTH DAILY  -     carvediloL (COREG) 25 mg tablet; Take 1 Tab by mouth two (2) times daily (with meals). 3. Drug-induced gout, unspecified chronicity, unspecified site-no recurrence. Continue allopurinol.  -     allopurinoL (ZYLOPRIM) 300 mg tablet; Take 1 Tab by mouth daily. 4. Prostate cancer screening  -     PSA SCREENING (SCREENING)    5. Vitamin D deficiency disease-repleted on previous labs. 6. Essential hypertension -blood pressure slightly high today. Will monitor and let me know if it is above 140.  -     UA/M W/RFLX CULTURE, COMP    7. Mixed hyperlipidemia-LDL goal of 100. Check labs to be sure that is met.     8. 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  -     diphtheria-pertussis, acellular,-tetanus (BOOSTRIX TDAP) 2.5-8-5 Lf-mcg-Lf/0.5mL susp susp; 0.5 mL by IntraMUSCular route once for 1 dose. Indications: treatment to prevent diphtheria, pertussis and tetanus  -     lisinopril (PRINIVIL, ZESTRIL) 20 mg tablet; TAKE ONE TABLET BY MOUTH DAILY  -     sildenafil citrate (VIAGRA) 100 mg tablet; Take 1 Tab by mouth as needed for Erectile Dysfunction. Follow-up and Dispositions    · Return in about 1 year (around 1/16/2021) for CPE. Advised him to call back or return to office if symptoms worsen/change/persist.  Discussed expected course/resolution/complications of diagnosis in detail with patient. Medication risks/benefits/costs/interactions/alternatives discussed with patient. He was given an after visit summary which includes diagnoses, current medications, & vitals. He expressed understanding with the diagnosis and plan.

## 2020-01-17 LAB
ALBUMIN SERPL-MCNC: 4.2 G/DL (ref 3.5–4.8)
ALBUMIN/GLOB SERPL: 2 {RATIO} (ref 1.2–2.2)
ALP SERPL-CCNC: 107 IU/L (ref 39–117)
ALT SERPL-CCNC: 13 IU/L (ref 0–44)
AST SERPL-CCNC: 18 IU/L (ref 0–40)
BASOPHILS # BLD AUTO: 0.1 X10E3/UL (ref 0–0.2)
BASOPHILS NFR BLD AUTO: 1 %
BILIRUB SERPL-MCNC: 0.8 MG/DL (ref 0–1.2)
BUN SERPL-MCNC: 13 MG/DL (ref 8–27)
BUN/CREAT SERPL: 12 (ref 10–24)
CALCIUM SERPL-MCNC: 9 MG/DL (ref 8.6–10.2)
CHLORIDE SERPL-SCNC: 104 MMOL/L (ref 96–106)
CHOLEST SERPL-MCNC: 123 MG/DL (ref 100–199)
CO2 SERPL-SCNC: 25 MMOL/L (ref 20–29)
CREAT SERPL-MCNC: 1.13 MG/DL (ref 0.76–1.27)
EOSINOPHIL # BLD AUTO: 0.2 X10E3/UL (ref 0–0.4)
EOSINOPHIL NFR BLD AUTO: 4 %
ERYTHROCYTE [DISTWIDTH] IN BLOOD BY AUTOMATED COUNT: 14.6 % (ref 11.6–15.4)
GLOBULIN SER CALC-MCNC: 2.1 G/DL (ref 1.5–4.5)
GLUCOSE SERPL-MCNC: 116 MG/DL (ref 65–99)
HCT VFR BLD AUTO: 45.5 % (ref 37.5–51)
HDLC SERPL-MCNC: 31 MG/DL
HGB BLD-MCNC: 14.9 G/DL (ref 13–17.7)
IMM GRANULOCYTES # BLD AUTO: 0 X10E3/UL (ref 0–0.1)
IMM GRANULOCYTES NFR BLD AUTO: 0 %
LDLC SERPL CALC-MCNC: 64 MG/DL (ref 0–99)
LYMPHOCYTES # BLD AUTO: 0.7 X10E3/UL (ref 0.7–3.1)
LYMPHOCYTES NFR BLD AUTO: 12 %
MCH RBC QN AUTO: 29 PG (ref 26.6–33)
MCHC RBC AUTO-ENTMCNC: 32.7 G/DL (ref 31.5–35.7)
MCV RBC AUTO: 89 FL (ref 79–97)
MONOCYTES # BLD AUTO: 0.6 X10E3/UL (ref 0.1–0.9)
MONOCYTES NFR BLD AUTO: 11 %
NEUTROPHILS # BLD AUTO: 4.1 X10E3/UL (ref 1.4–7)
NEUTROPHILS NFR BLD AUTO: 72 %
PLATELET # BLD AUTO: 119 X10E3/UL (ref 150–450)
POTASSIUM SERPL-SCNC: 3.8 MMOL/L (ref 3.5–5.2)
PROT SERPL-MCNC: 6.3 G/DL (ref 6–8.5)
RBC # BLD AUTO: 5.13 X10E6/UL (ref 4.14–5.8)
SODIUM SERPL-SCNC: 143 MMOL/L (ref 134–144)
TRIGL SERPL-MCNC: 140 MG/DL (ref 0–149)
TSH SERPL DL<=0.005 MIU/L-ACNC: 0.55 UIU/ML (ref 0.45–4.5)
VLDLC SERPL CALC-MCNC: 28 MG/DL (ref 5–40)
WBC # BLD AUTO: 5.6 X10E3/UL (ref 3.4–10.8)

## 2020-01-21 LAB
APPEARANCE UR: CLEAR
BACTERIA #/AREA URNS HPF: ABNORMAL /[HPF]
BACTERIA UR CULT: ABNORMAL
BILIRUB UR QL STRIP: NEGATIVE
CASTS URNS QL MICRO: ABNORMAL /LPF
COLOR UR: YELLOW
EPI CELLS #/AREA URNS HPF: ABNORMAL /HPF (ref 0–10)
GLUCOSE UR QL: NEGATIVE
HGB UR QL STRIP: ABNORMAL
KETONES UR QL STRIP: NEGATIVE
LEUKOCYTE ESTERASE UR QL STRIP: ABNORMAL
MICRO URNS: ABNORMAL
MUCOUS THREADS URNS QL MICRO: PRESENT
NITRITE UR QL STRIP: NEGATIVE
PH UR STRIP: 7 [PH] (ref 5–7.5)
PROT UR QL STRIP: NEGATIVE
PSA SERPL-MCNC: 3.1 NG/ML (ref 0–4)
RBC #/AREA URNS HPF: ABNORMAL /HPF (ref 0–2)
SP GR UR: 1.01 (ref 1–1.03)
URATE SERPL-MCNC: 5 MG/DL (ref 3.7–8.6)
URINALYSIS REFLEX , 377201: ABNORMAL
UROBILINOGEN UR STRIP-MCNC: 1 MG/DL (ref 0.2–1)
WBC #/AREA URNS HPF: ABNORMAL /HPF (ref 0–5)

## 2020-06-03 ENCOUNTER — OFFICE VISIT (OUTPATIENT)
Dept: PRIMARY CARE CLINIC | Age: 80
End: 2020-06-03

## 2020-06-03 VITALS — HEART RATE: 70 BPM | TEMPERATURE: 97.8 F | OXYGEN SATURATION: 97 %

## 2020-06-03 DIAGNOSIS — Z01.818 PRE-OP EXAM: Primary | ICD-10-CM

## 2020-06-03 NOTE — PROGRESS NOTES
Patient is being seen at the [facility]. Please see scanned documentation as well for further information. S:  Mr. Julia Child presents for pre-op or pre-procedure testing for Covid 19. Patient has procedure or surgery by Dr. Rohit Smith scheduled for 6/10/20. Patient denies current Covid type symptoms. O:    Visit Vitals  Pulse 70   Temp 97.8 °F (36.6 °C) (Oral)   SpO2 97%     Alert and oriented  No acute distress, no increased work of breathing  Normocephalic, atraumatic  Skin color normal  Calm and cooperative  Voice clear, conversant without shortness of breath    A/P:  Pre-op, Pre-procedure exam    Covid 19 testing performed  Patient understands he will be contacted if the results are positive. Follow up prn.

## 2020-06-04 LAB — SARS-COV-2, NAA: NOT DETECTED

## 2020-06-10 ENCOUNTER — HOSPITAL ENCOUNTER (OUTPATIENT)
Age: 80
Setting detail: OUTPATIENT SURGERY
Discharge: HOME | End: 2020-06-10
Attending: INTERNAL MEDICINE | Admitting: INTERNAL MEDICINE
Payer: MEDICARE

## 2020-06-10 VITALS
HEART RATE: 66 BPM | DIASTOLIC BLOOD PRESSURE: 39 MMHG | SYSTOLIC BLOOD PRESSURE: 158 MMHG | TEMPERATURE: 98.3 F | OXYGEN SATURATION: 99 % | HEIGHT: 72 IN | WEIGHT: 234 LBS | BODY MASS INDEX: 31.69 KG/M2 | RESPIRATION RATE: 18 BRPM

## 2020-06-10 DIAGNOSIS — R06.02 SHORTNESS OF BREATH: ICD-10-CM

## 2020-06-10 LAB
ATRIAL RATE: 60 BPM
CALCULATED P AXIS, ECG09: 19 DEGREES
CALCULATED R AXIS, ECG10: 29 DEGREES
CALCULATED T AXIS, ECG11: -80 DEGREES
DIAGNOSIS, 93000: NORMAL
P-R INTERVAL, ECG05: 212 MS
Q-T INTERVAL, ECG07: 490 MS
QRS DURATION, ECG06: 136 MS
QTC CALCULATION (BEZET), ECG08: 490 MS
VENTRICULAR RATE, ECG03: 60 BPM

## 2020-06-10 PROCEDURE — C1725 CATH, TRANSLUMIN NON-LASER: HCPCS | Performed by: INTERNAL MEDICINE

## 2020-06-10 PROCEDURE — C1894 INTRO/SHEATH, NON-LASER: HCPCS | Performed by: INTERNAL MEDICINE

## 2020-06-10 PROCEDURE — 77030013715 HC INFL SYS MRTM -B: Performed by: INTERNAL MEDICINE

## 2020-06-10 PROCEDURE — 99153 MOD SED SAME PHYS/QHP EA: CPT | Performed by: INTERNAL MEDICINE

## 2020-06-10 PROCEDURE — 74011250637 HC RX REV CODE- 250/637: Performed by: INTERNAL MEDICINE

## 2020-06-10 PROCEDURE — 92928 PRQ TCAT PLMT NTRAC ST 1 LES: CPT | Performed by: INTERNAL MEDICINE

## 2020-06-10 PROCEDURE — 74011000250 HC RX REV CODE- 250: Performed by: INTERNAL MEDICINE

## 2020-06-10 PROCEDURE — C1887 CATHETER, GUIDING: HCPCS | Performed by: INTERNAL MEDICINE

## 2020-06-10 PROCEDURE — 77030004532 HC CATH ANGI DX IMP BSC -A: Performed by: INTERNAL MEDICINE

## 2020-06-10 PROCEDURE — 93005 ELECTROCARDIOGRAM TRACING: CPT

## 2020-06-10 PROCEDURE — 74011636320 HC RX REV CODE- 636/320: Performed by: INTERNAL MEDICINE

## 2020-06-10 PROCEDURE — 74011250636 HC RX REV CODE- 250/636: Performed by: INTERNAL MEDICINE

## 2020-06-10 PROCEDURE — 77030004533 HC CATH ANGI DX IMP BSC -B: Performed by: INTERNAL MEDICINE

## 2020-06-10 PROCEDURE — C1874 STENT, COATED/COV W/DEL SYS: HCPCS | Performed by: INTERNAL MEDICINE

## 2020-06-10 PROCEDURE — 74011000258 HC RX REV CODE- 258: Performed by: INTERNAL MEDICINE

## 2020-06-10 PROCEDURE — 77030013744: Performed by: INTERNAL MEDICINE

## 2020-06-10 PROCEDURE — 93458 L HRT ARTERY/VENTRICLE ANGIO: CPT | Performed by: INTERNAL MEDICINE

## 2020-06-10 PROCEDURE — C1769 GUIDE WIRE: HCPCS | Performed by: INTERNAL MEDICINE

## 2020-06-10 PROCEDURE — C1760 CLOSURE DEV, VASC: HCPCS | Performed by: INTERNAL MEDICINE

## 2020-06-10 PROCEDURE — 99152 MOD SED SAME PHYS/QHP 5/>YRS: CPT | Performed by: INTERNAL MEDICINE

## 2020-06-10 DEVICE — STENT RONYX30012W RESOLUTE ONYX 3.00X12
Type: IMPLANTABLE DEVICE | Status: FUNCTIONAL
Brand: RESOLUTE ONYX™

## 2020-06-10 RX ORDER — HEPARIN SODIUM 200 [USP'U]/100ML
INJECTION, SOLUTION INTRAVENOUS
Status: COMPLETED | OUTPATIENT
Start: 2020-06-10 | End: 2020-06-10

## 2020-06-10 RX ORDER — LIDOCAINE HYDROCHLORIDE 10 MG/ML
INJECTION INFILTRATION; PERINEURAL AS NEEDED
Status: DISCONTINUED | OUTPATIENT
Start: 2020-06-10 | End: 2020-06-10 | Stop reason: HOSPADM

## 2020-06-10 RX ORDER — SODIUM CHLORIDE 9 MG/ML
INJECTION, SOLUTION INTRAVENOUS
Status: COMPLETED | OUTPATIENT
Start: 2020-06-10 | End: 2020-06-10

## 2020-06-10 RX ORDER — MIDAZOLAM HYDROCHLORIDE 1 MG/ML
INJECTION, SOLUTION INTRAMUSCULAR; INTRAVENOUS AS NEEDED
Status: DISCONTINUED | OUTPATIENT
Start: 2020-06-10 | End: 2020-06-10 | Stop reason: HOSPADM

## 2020-06-10 RX ORDER — SODIUM CHLORIDE 9 MG/ML
1.5 INJECTION, SOLUTION INTRAVENOUS CONTINUOUS
Status: DISPENSED | OUTPATIENT
Start: 2020-06-10 | End: 2020-06-10

## 2020-06-10 RX ORDER — CLOPIDOGREL 300 MG/1
TABLET, FILM COATED ORAL AS NEEDED
Status: DISCONTINUED | OUTPATIENT
Start: 2020-06-10 | End: 2020-06-10 | Stop reason: HOSPADM

## 2020-06-10 RX ORDER — FENTANYL CITRATE 50 UG/ML
INJECTION, SOLUTION INTRAMUSCULAR; INTRAVENOUS AS NEEDED
Status: DISCONTINUED | OUTPATIENT
Start: 2020-06-10 | End: 2020-06-10 | Stop reason: HOSPADM

## 2020-06-10 RX ORDER — SODIUM CHLORIDE 9 MG/ML
3 INJECTION, SOLUTION INTRAVENOUS CONTINUOUS
Status: DISPENSED | OUTPATIENT
Start: 2020-06-10 | End: 2020-06-10

## 2020-06-10 RX ADMIN — SODIUM CHLORIDE 1.5 ML/KG/HR: 900 INJECTION, SOLUTION INTRAVENOUS at 09:30

## 2020-06-10 RX ADMIN — SODIUM CHLORIDE 3 ML/KG/HR: 900 INJECTION, SOLUTION INTRAVENOUS at 07:19

## 2020-06-10 NOTE — DISCHARGE INSTRUCTIONS
[email or web address]    Patient Discharge Instructions    Kezia Gardner / [id number] : 1940    Admitted 6/10/2020 Discharged: 6/10/2020       · It is important that you take the medication exactly as they are prescribed. · Keep your medication in the bottles provided by the pharmacist and keep a list of the medication names, dosages, and times to be taken in your wallet. · Do not take other medications without consulting your doctor. BRING ALL OF YOUR MEDICINES TO YOUR OFFICE VISIT with Dr. Cristi Lin with Mila Molina MD in 2 week      Cardiac Catheterization  Discharge Instructions     Do not drive, operate any machinery, or sign any legal documents for 24 hours after your procedure. You must have someone to drive you home.  You may take a shower 24 hours after your cardiac catheterization. Be sure to get the dressing wet and then remove it; gently wash the area with warm soapy water. Pat dry and leave open to air. To help prevent infections, be sure to keep the cath site clean and dry. No lotions, creams, powders, ointments, etc. in the cath site for approximately 1 week.  Do not take a tub bath, get in a hot tub or swimming pool for approximately 5 days or until the cath site is completely healed.  No strenuous activity or heavy lifting over 10 lbs. for 7 days.  Drink plenty of fluids for 24-48 hours after your cath to flush the contrast dye from your kidneys. No alcoholic beverages for 24 hours. You may resume your previous diet (low fat, low cholesterol) after your cath.  After your cath, some bruising or discomfort is common during the healing process. Tylenol, 1-2 tablets every 6 hours as needed, is recommended if you experience any discomfort.   If you experience any signs or symptoms of infection such as fever, chills, or poorly healing incision, persistent tenderness or swelling in the groin, redness and/or warmth to the touch, numbness, significant tingling or pain at the groin site or affected extremity, rash, drainage from the cath site, or if the leg feels tight or swollen, call your physician right away.  If bleeding at the cath site occurs, take a clean gauze pad and apply direct pressure to the groin just above the puncture site. Call 911 immediately, and continue to apply direct pressure until an ambulance gets to your location.  You may return to work  2  days after your cardiac cath if no groin bleeding. Information obtained by :  I understand that if any problems occur once I am at home I am to contact my physician. I understand and acknowledge receipt of the instructions indicated above. R.N.'s Signature                                                                  Date/Time                                                                                                                                              Patient or Representative Signature                                                          Date/Time      Nadia Berry MD     CARDIAC CATHETERIZATION/ [address]    If possible, have someone stay with you for the first night. It is normal to feel tired for the first couple of days. Take it easy and follow your physicians instructions on activity. CHECK THE CATHETER INSERTION SITE DAILY:    If bleeding at the cath site occurs, take a clean washcloth and apply direct pressure just above the puncture site for at least 15 minutes. Call 911 immediately if the bleeding is not controlled. Continue to apply direct pressure until an ambulance gets to your location. Do not try to drive yourself or have someone else drive you to the hospital.  Have the ambulance bring you to the emergency room. You may shower 24 hours after your procedure.  Gently remove the bandage during showering. Wash with soap and water and pat dry. To prevent infection, keep the groin area/insertion site clean and dry. Do not apply powders, creams, lotions, or ointments to the site for 5 days. You may cover the site with a fresh Band-Aid each day until well healed. You may notice a small lump at the site. This is normal and may last up to 6 weeks. CALL THE PHYSICIAN:  ? If the site becomes red, swollen, or feels warm to the touch, or is healing poorly    ? If you note any large/extending bruise, fever >101.0 or chills  ? If your extremity has numbness, tingling, discoloration, abnormal swelling, tightness or pain   ? If you have difficulty with urination or develop nausea, vomiting, or severe abdominal pain    ACTIVITY for the first 24-48 hours, or as instructed by your physician:  ? No lifting, pushing or pulling over 10 pounds and no straining the insertion site. Do not lift grocery bags or the garbage can; do not run the vacuum  or  for 7 days. ? You may start walking short distances the day of your procedure. Gradually increase as tolerated each day. Current activity recommendations are 30 minutes of exercise at least 5 days a week. Work up to this as you recover. ? Avoid walking outside in extremes of heat or cold. Walk inside (at home, at the mall, or at a large store) when it is cold and windy or hot and humid. THINGS TO KEEP IN MIND:   ? Do not drive, operate any machinery, or sign any legal documents for 24 hours after your procedure, or as directed by your physician. You must have someone drive you home after your procedure. ? Drink plenty of fluids for 24-48 hours after your procedure to flush the contrast dye from your kidneys. ? Limit the number of times you go up and down the stairs  ? Take rests and pace yourself with activity  ?  Be careful and do not strain with bowel movements    MEDICATIONS:  ? Take all medications as prescribed  ? Call your physician if you have any questions  ? Keep an updated list of your medications with you at all times and give a list to your primary physician and pharmacist  ? You may use Tylenol 325mg 1-2 tablets every 6 hours as needed for pain or discomfort, unless otherwise instructed. If you have significant discomfort more than 48 hours after your procedure, please call your physicians office. SIGNS AND SYMPTOMS:  ? Notify your physician for new or recurrent symptoms of chest discomfort, unusual shortness of breath or fatigue. These could be signs of a problem and should be discussed with your physician. ? For significant chest pain or symptoms of angina not relieved with rest:  if you have been prescribed Nitroglycerin, take as directed (taken under the tongue, one at a time 5 minutes apart for a total of 3 doses, sitting down). If the discomfort is not relieved after the 3rd Nitroglycerin, call 911. If you have not been prescribed Nitroglycerin and your chest discomfort is significant, call 911. Take the ambulance, do not try to drive yourself or have someone else drive you to the hospital.     AFTER CARE:  ? Follow up with your physician as instructed  ? Follow a heart healthy diet with proper portion control, daily stress management, daily exercise, blood pressure and cholesterol control, and smoking cessation. The success of your stent, if you had one placed, and the prevention of future catheterizations heavily depends on your lifestyle changes you make now! ? You may start walking short distances the day of your procedure. Gradually increase as tolerated each day. Current activity recommendations are 30 minutes of exercise at least 5 days a week. Work up to this as you recover. Walk, ride a bike, or choose any other activity you enjoy to reach this activity goal.   ? Avoid walking outside in extremes of heat or cold.  Walk inside (at home, at the mall, or at a large store) when it is cold and windy or hot and humid. ? If you had a stent placed, consider Cardiac Wellness as a resource to help you make the needed lifestyle changes to live a heart healthy lifestyle. Discuss your candidacy with your physician. If you have questions, call your physicians office or the Cardiac Cath Lab at [phone]. The Cath Lab is operational from 6:30 a.m. to 5:00 p.m., Monday through Friday. After hours, notify your physician. [address] [place] [address] can be reached at [phone]. [facility]ness is operational Monday-Thursday 8:30 a.m. to 5:00 p.m. and Friday 8:30 a.m. to 12:00 p.m.       Remember:  IN CASE OF BLEEDING: KEEP FIRM PRESSURE ON THE PROCEDURE SITE AND CALL 911 IF NOT CONTROLLED

## 2020-06-10 NOTE — Clinical Note
Lesion located in the Mid Cx. Balloon inflated using single inflation technique. Lesion #1: Pressure = 10 yazan; Duration = 24 sec.

## 2020-06-10 NOTE — PROGRESS NOTES
Cardiac Cath Lab Procedure Area Arrival Note:    Ajay Colmenares arrived to Cardiac Cath Lab, Procedure Area. Patient identifiers verified with NAME and DATE OF BIRTH. Procedure verified with patient. Consent forms verified. Allergies verified. Patient informed of procedure and plan of care. Questions answered with review. Patient voiced understanding of procedure and plan of care. Patient on cardiac monitor, non-invasive blood pressure, SPO2 monitor. On  or O2 @ 2 lpm via NC.  IV of NS on pump at 159 ml/hr. Patient status doing well without problems. Patient is A&Ox 4. Patient reports no pain. Patient medicated during procedure with orders obtained and verified by Dr. Kavitha Hernandez. Refer to patients Cardiac Cath Lab PROCEDURE REPORT for vital signs, assessment, status, and response during procedure, printed at end of case. Printed report on chart or scanned into chart. TRANSFER - OUT REPORT:    Verbal report given to Abraham Andujar on Ajay Colmenares being transferred to Cath Lab Recovery for routine progression of care       Report consisted of patients Situation, Background, Assessment and   Recommendations(SBAR). Information from the following report(s) SBAR, Procedure Summary and MAR was reviewed with the receiving nurse. Opportunity for questions and clarification was provided. Message left for patient which stated that at next appointment cast will be removed, new xrays obtained and then new cast applied. Message stated that writer would estimate about 1-1.5hr for appointment.

## 2020-06-10 NOTE — ROUTINE PROCESS
Cardiac Cath Lab Recovery Arrival Note:      Pat Cortez arrived to Cardiac Cath Lab, Recovery Area. Staff introduced to patient. Patient identifiers verified with NAME and DATE OF BIRTH. Procedure verified with patient. Consent forms reviewed and signed by patient or authorized representative and verified. Allergies verified. Patient informed of procedure and plan of care. Questions answered with review. Patient prepped for procedure, per orders from physician, prior to arrival.    Patient on cardiac monitor, non-invasive blood pressure, SPO2 monitor. Patient is A&Ox 4. Patient reports no pain, no chest pain, no n/v. Patient in stretcher, in low position, with side rails up, call bell within reach, patient instructed to call of assistance as needed. Patient prep in: [facility] Recovery Area, Bed# 6. Family in: Son: Norberto Eduardo [phone].    Prep by: Yuliana Hawk RN

## 2020-06-10 NOTE — PROGRESS NOTES
Rosario Hartley ambulated @ 3477  (time) approximately  feet. Patient tolerated ambulation without adverse advents. right groin (right/left, groin/arm)  without bleeding or hematoma noted.

## 2020-06-10 NOTE — Clinical Note
Lesion: Located in the Mid Cx. Stent inserted. Stent deployed. Single technique used. First inflation pressure = 18 yazan; inflation time: 16 sec.

## 2020-06-10 NOTE — Clinical Note
Lesion located in the Mid Cx. Balloon inserted. Balloon inflated using multiple inflations inflation technique. Lesion #1: Pressure = 14 yazan; Duration = 120 sec. Inflation 2: Pressure = 18 yazan; Duration = 60 sec. Inflation 3: Pressure = 18 yazan; Duration = 16 sec.

## 2020-06-10 NOTE — Clinical Note
Balloon inflated using multiple inflations inflation technique. Lesion #1: Pressure = 15 yazan; Duration = 55 sec. Inflation 2: Pressure = 18 yazan; Duration = 52 sec. Inflation 3: Pressure = 20 yazan; Duration = 48 sec.

## 2020-06-10 NOTE — Clinical Note
Lesion located in the Mid Cx. Balloon inflated using multiple inflations inflation technique. Lesion #1: Pressure = 10 yazan; Duration = 30 sec. Inflation 2: Pressure = 20 yazan; Duration = 45 sec.

## 2020-06-10 NOTE — Clinical Note
Lesion located in the Mid Cx. Balloon inserted. Balloon inflated using multiple inflations inflation technique. Lesion #1: Pressure = 8 yazan; Duration = 70 sec. Inflation 2: Pressure = 12 yazan; Duration = 25 sec. Inflation 3: Pressure = 20 yazan; Duration = 55 sec.    BALLOON BURST WITH 4TH INFLATION ATTEMPT

## 2020-06-10 NOTE — PROGRESS NOTES
TRANSFER - IN REPORT:    Verbal report received from Ravinder Jaeger on [employer], Procedure: Cath procedure with intervention , from the Cardiac Cath lab, for routine progression of care. Report consisted of patients Situation, Background, Assessment and Recommendations(SBAR). Information from the following report(s) OR Summary, MAR and Recent Results was reviewed with the receiving clinician. Opportunity for questions and clarification was provided. Assessment completed upon patients arrival to [address] [place] [address]. Cardiac Cath Lab Recovery Arrival Note:     [employer] [facility] [employer] arrived to [facility] recovery area. Patient procedure= Cath procedure with stent. Patient on cardiac monitor, non-invasive blood pressure, Patient status doing well without problems. Patient is A&Ox 4. Patient reports no pain, no chest pain, no n/v. Procedure site without any bleeding and no hematoma.

## 2020-06-12 ENCOUNTER — TELEPHONE (OUTPATIENT)
Dept: CARDIAC REHAB | Age: 80
End: 2020-06-12

## 2020-06-12 NOTE — TELEPHONE ENCOUNTER
6/12/2020 Cardiac Rehab: Called Mr. Nirav Edmond  to discuss participation in the Cardiac Rehab Program following a stent on 6/10/2020. Spoke with wife, Aravind Lynn, and she reports he is doing well and that patient is \"at the store\". They have no questions currently. Discussed the cardiac rehab program and wife reports that Nirav Edmond has participated at [facility] [place] in the past and requested that we call back to discuss with him. Referral entered and will forward information for OP to follow.    Pranav Lang RN

## 2020-06-30 ENCOUNTER — TELEPHONE (OUTPATIENT)
Dept: CARDIAC REHAB | Age: 80
End: 2020-06-30

## 2020-06-30 NOTE — TELEPHONE ENCOUNTER
6/30/2020 Cardiac Rehab: Called . [facility] and spoke with Daja Davis, his wife, to discuss participation in the Cardiac Rehab Program following STENT on 6/10/2020. Mrs. Luis Robles said he has participated in CRP in the past and has declined coming again.   Ynes Melissa

## 2021-01-21 ENCOUNTER — OFFICE VISIT (OUTPATIENT)
Dept: INTERNAL MEDICINE CLINIC | Age: 81
End: 2021-01-21
Payer: MEDICARE

## 2021-01-21 VITALS
OXYGEN SATURATION: 98 % | SYSTOLIC BLOOD PRESSURE: 139 MMHG | DIASTOLIC BLOOD PRESSURE: 74 MMHG | RESPIRATION RATE: 16 BRPM | TEMPERATURE: 97.1 F | WEIGHT: 240 LBS | BODY MASS INDEX: 32.51 KG/M2 | HEART RATE: 74 BPM | HEIGHT: 72 IN

## 2021-01-21 DIAGNOSIS — I10 ESSENTIAL HYPERTENSION: ICD-10-CM

## 2021-01-21 DIAGNOSIS — Z13.5 GLAUCOMA SCREENING: ICD-10-CM

## 2021-01-21 DIAGNOSIS — M10.20 DRUG-INDUCED GOUT, UNSPECIFIED CHRONICITY, UNSPECIFIED SITE: ICD-10-CM

## 2021-01-21 DIAGNOSIS — I50.22 CHRONIC SYSTOLIC CONGESTIVE HEART FAILURE (HCC): ICD-10-CM

## 2021-01-21 DIAGNOSIS — I25.10 ATHEROSCLEROSIS OF NATIVE CORONARY ARTERY OF NATIVE HEART WITHOUT ANGINA PECTORIS: ICD-10-CM

## 2021-01-21 DIAGNOSIS — E78.2 MIXED HYPERLIPIDEMIA: ICD-10-CM

## 2021-01-21 DIAGNOSIS — Z00.00 MEDICARE ANNUAL WELLNESS VISIT, SUBSEQUENT: Primary | ICD-10-CM

## 2021-01-21 LAB
ALBUMIN SERPL-MCNC: 4 G/DL (ref 3.5–5)
ALBUMIN/GLOB SERPL: 1.4 {RATIO} (ref 1.1–2.2)
ALP SERPL-CCNC: 137 U/L (ref 45–117)
ALT SERPL-CCNC: 37 U/L (ref 12–78)
ANION GAP SERPL CALC-SCNC: 7 MMOL/L (ref 5–15)
APPEARANCE UR: CLEAR
AST SERPL-CCNC: 33 U/L (ref 15–37)
BACTERIA URNS QL MICRO: NEGATIVE /HPF
BASOPHILS # BLD: 0.1 K/UL (ref 0–0.1)
BASOPHILS NFR BLD: 1 % (ref 0–1)
BILIRUB SERPL-MCNC: 1.3 MG/DL (ref 0.2–1)
BILIRUB UR QL: NEGATIVE
BUN SERPL-MCNC: 17 MG/DL (ref 6–20)
BUN/CREAT SERPL: 14 (ref 12–20)
CALCIUM SERPL-MCNC: 9.2 MG/DL (ref 8.5–10.1)
CAOX CRY URNS QL MICRO: ABNORMAL
CHLORIDE SERPL-SCNC: 105 MMOL/L (ref 97–108)
CHOLEST SERPL-MCNC: 128 MG/DL
CO2 SERPL-SCNC: 31 MMOL/L (ref 21–32)
COLOR UR: ABNORMAL
CREAT SERPL-MCNC: 1.22 MG/DL (ref 0.7–1.3)
DIFFERENTIAL METHOD BLD: ABNORMAL
EOSINOPHIL # BLD: 0.2 K/UL (ref 0–0.4)
EOSINOPHIL NFR BLD: 3 % (ref 0–7)
EPITH CASTS URNS QL MICRO: ABNORMAL /LPF
ERYTHROCYTE [DISTWIDTH] IN BLOOD BY AUTOMATED COUNT: 15.1 % (ref 11.5–14.5)
GLOBULIN SER CALC-MCNC: 2.8 G/DL (ref 2–4)
GLUCOSE SERPL-MCNC: 129 MG/DL (ref 65–100)
GLUCOSE UR STRIP.AUTO-MCNC: NEGATIVE MG/DL
HCT VFR BLD AUTO: 47.2 % (ref 36.6–50.3)
HDLC SERPL-MCNC: 31 MG/DL
HDLC SERPL: 4.1 {RATIO} (ref 0–5)
HGB BLD-MCNC: 15.2 G/DL (ref 12.1–17)
HGB UR QL STRIP: NEGATIVE
IMM GRANULOCYTES # BLD AUTO: 0 K/UL (ref 0–0.04)
IMM GRANULOCYTES NFR BLD AUTO: 0 % (ref 0–0.5)
KETONES UR QL STRIP.AUTO: NEGATIVE MG/DL
LDLC SERPL CALC-MCNC: 62.2 MG/DL (ref 0–100)
LEUKOCYTE ESTERASE UR QL STRIP.AUTO: ABNORMAL
LIPID PROFILE,FLP: ABNORMAL
LYMPHOCYTES # BLD: 0.9 K/UL (ref 0.8–3.5)
LYMPHOCYTES NFR BLD: 13 % (ref 12–49)
MCH RBC QN AUTO: 29.8 PG (ref 26–34)
MCHC RBC AUTO-ENTMCNC: 32.2 G/DL (ref 30–36.5)
MCV RBC AUTO: 92.5 FL (ref 80–99)
MONOCYTES # BLD: 0.7 K/UL (ref 0–1)
MONOCYTES NFR BLD: 11 % (ref 5–13)
NEUTS SEG # BLD: 4.7 K/UL (ref 1.8–8)
NEUTS SEG NFR BLD: 72 % (ref 32–75)
NITRITE UR QL STRIP.AUTO: NEGATIVE
NRBC # BLD: 0 K/UL (ref 0–0.01)
NRBC BLD-RTO: 0 PER 100 WBC
PH UR STRIP: 6.5 [PH] (ref 5–8)
PLATELET # BLD AUTO: 121 K/UL (ref 150–400)
PMV BLD AUTO: 11.9 FL (ref 8.9–12.9)
POTASSIUM SERPL-SCNC: 3.8 MMOL/L (ref 3.5–5.1)
PROT SERPL-MCNC: 6.8 G/DL (ref 6.4–8.2)
PROT UR STRIP-MCNC: NEGATIVE MG/DL
RBC # BLD AUTO: 5.1 M/UL (ref 4.1–5.7)
RBC #/AREA URNS HPF: ABNORMAL /HPF (ref 0–5)
SODIUM SERPL-SCNC: 143 MMOL/L (ref 136–145)
SP GR UR REFRACTOMETRY: 1.01 (ref 1–1.03)
TRIGL SERPL-MCNC: 174 MG/DL (ref ?–150)
TSH SERPL DL<=0.05 MIU/L-ACNC: 0.77 UIU/ML (ref 0.36–3.74)
UA: UC IF INDICATED,UAUC: ABNORMAL
URATE SERPL-MCNC: 6.2 MG/DL (ref 3.5–7.2)
UROBILINOGEN UR QL STRIP.AUTO: 1 EU/DL (ref 0.2–1)
VLDLC SERPL CALC-MCNC: 34.8 MG/DL
WBC # BLD AUTO: 6.6 K/UL (ref 4.1–11.1)
WBC URNS QL MICRO: ABNORMAL /HPF (ref 0–4)

## 2021-01-21 PROCEDURE — G8536 NO DOC ELDER MAL SCRN: HCPCS | Performed by: INTERNAL MEDICINE

## 2021-01-21 PROCEDURE — G0463 HOSPITAL OUTPT CLINIC VISIT: HCPCS | Performed by: INTERNAL MEDICINE

## 2021-01-21 PROCEDURE — G8510 SCR DEP NEG, NO PLAN REQD: HCPCS | Performed by: INTERNAL MEDICINE

## 2021-01-21 PROCEDURE — G8427 DOCREV CUR MEDS BY ELIG CLIN: HCPCS | Performed by: INTERNAL MEDICINE

## 2021-01-21 PROCEDURE — G8417 CALC BMI ABV UP PARAM F/U: HCPCS | Performed by: INTERNAL MEDICINE

## 2021-01-21 PROCEDURE — G0439 PPPS, SUBSEQ VISIT: HCPCS | Performed by: INTERNAL MEDICINE

## 2021-01-21 PROCEDURE — 1101F PT FALLS ASSESS-DOCD LE1/YR: CPT | Performed by: INTERNAL MEDICINE

## 2021-01-21 PROCEDURE — G8754 DIAS BP LESS 90: HCPCS | Performed by: INTERNAL MEDICINE

## 2021-01-21 PROCEDURE — G8752 SYS BP LESS 140: HCPCS | Performed by: INTERNAL MEDICINE

## 2021-01-21 RX ORDER — SILDENAFIL 100 MG/1
100 TABLET, FILM COATED ORAL AS NEEDED
Qty: 30 TAB | Refills: 4 | Status: SHIPPED | OUTPATIENT
Start: 2021-01-21 | End: 2022-02-04

## 2021-01-21 RX ORDER — FUROSEMIDE 80 MG/1
80 TABLET ORAL DAILY
Qty: 90 TAB | Refills: 2 | Status: SHIPPED | OUTPATIENT
Start: 2021-01-21 | End: 2022-02-16

## 2021-01-21 NOTE — PROGRESS NOTES
This is the Subsequent Medicare Annual Wellness Exam, performed 12 months or more after the Initial AWV or the last Subsequent AWV    I have reviewed the patient's medical history in detail and updated the computerized patient record. Also for follow-up of his health issues. He is overall been feeling well. His breathing is good. He denies any PND or orthopnea. No nausea or vomiting. He has some chest wall discomfort but no exertional chest pain. He just saw the cardiologist and is scheduled for a stress test in the next 2 months. No gout. Some occasional lower back pain. Continues to use sildenafil with success and would like a refill. He does note some occasional nocturia and urinary hesitancy in the morning time.     ROS - Per HPI    Physical Examination: General appearance - alert, well appearing, and in no distress  Ears - bilateral TM's and external ear canals normal  Nose - normal and patent, no erythema, discharge or polyps  Mouth - mucous membranes moist, pharynx normal without lesions  Neck - supple, no significant adenopathy  Lymphatics - no palpable lymphadenopathy, no hepatosplenomegaly  Chest - clear to auscultation, no wheezes, rales or rhonchi, symmetric air entry  Heart - normal rate and regular rhythm  Abdomen - soft, nontender, nondistended, no masses or organomegaly  Neurological - alert, oriented, normal speech, no focal findings or movement disorder noted  Musculoskeletal - no joint tenderness, deformity or swelling  Extremities - peripheral pulses normal, no pedal edema, no clubbing or cyanosis      Depression Risk Factor Screening:     3 most recent PHQ Screens 1/21/2021   Little interest or pleasure in doing things Not at all   Feeling down, depressed, irritable, or hopeless Not at all   Total Score PHQ 2 0       Alcohol Risk Screen    Do you average more than 1 drink per night or more than 7 drinks a week: No    In the past three months have you have had more than 4 drinks containing alcohol on one occasion: No        Functional Ability and Level of Safety:    Hearing: Hearing is good. Activities of Daily Living: The home contains: no safety equipment. Patient does total self care      Ambulation: with no difficulty     Fall Risk:  Fall Risk Assessment, last 12 mths 1/21/2021   Able to walk? Yes   Fall in past 12 months? 0   Do you feel unsteady? 0   Are you worried about falling 0      Abuse Screen:  Patient is not abused       Cognitive Screening    Has your family/caregiver stated any concerns about your memory: no         Assessment/Plan   Education and counseling provided:  Are appropriate based on today's review and evaluation  Influenza Vaccinerefused by the patient. Screening for glaucoma  Diabetes screening test    Diagnoses and all orders for this visit:    1. Medicare annual wellness visit, subsequent    2. Drug-induced gout, unspecified chronicity, unspecified sitestable on allopurinol. Not using colchicine at all.  -     URIC ACID; Future    3. Atherosclerosis of native coronary artery of native heart without angina pectorisstable. Agree with plans for stress test in the next 2 months. 4. Glaucoma screening  -     REFERRAL TO OPHTHALMOLOGY    5. Essential hypertensionblood pressure well controlled. Check electrolytes today and continue current meds. -     TSH 3RD GENERATION; Future  -     URINALYSIS W/ REFLEX CULTURE; Future    6. Mixed hyperlipidemiaLDL goal of 100. Check labs to be sure that is met. Tolerating statins well. -     LIPID PANEL; Future    7. Chronic systolic congestive heart failure (HCC)stable on diuretics. Suggested he do daily weights and went through that with him in detail today about the mechanism to do that and when to call for problems.  -     METABOLIC PANEL, COMPREHENSIVE; Future  -     CBC WITH AUTOMATED DIFF; Future    Other orders  -     furosemide (LASIX) 80 mg tablet; Take 1 Tab by mouth daily.   -     sildenafil citrate (VIAGRA) 100 mg tablet; Take 1 Tab by mouth as needed for Erectile Dysfunction. Health Maintenance Due     Health Maintenance Due   Topic Date Due    COVID-19 Vaccine (1 of 2) 05/17/1957    DTaP/Tdap/Td series (1 - Tdap) 05/17/1962    Shingrix Vaccine Age 50> (1 of 2) 05/17/1991    GLAUCOMA SCREENING Q2Y  11/23/2012    Flu Vaccine (1) 09/01/2020    Lipid Screen  01/16/2021       Patient Care Team   Patient Care Team:  Alba Stephens MD as PCP - General  Alba Stephens MD as PCP 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  90 Tab 3    lisinopril (PRINIVIL, ZESTRIL) 20 mg tablet TAKE ONE TABLET BY MOUTH DAILY 90 Tab 3    clopidogreL (PLAVIX) 75 mg tab TAKE ONE TABLET BY MOUTH DAILY 90 Tab 3    carvediloL (COREG) 25 mg tablet Take 1 Tab by mouth two (2) times daily (with meals). 180 Tab 3    allopurinoL (ZYLOPRIM) 300 mg tablet Take 1 Tab by mouth daily. 90 Tab 3    sildenafil citrate (VIAGRA) 100 mg tablet Take 1 Tab by mouth as needed for Erectile Dysfunction. 30 Tab 4    GLUCOSAMINE-CONDROITIN-HERB182 PO Take  by mouth two (2) times a day.  methocarbamol (ROBAXIN) 500 mg tablet Take 1 tablet by mouth nightly as needed. 20 tablet 0    colchicine 0.6 mg tablet Take 1 tablet by mouth two (2) times daily as needed. 180 tablet 3    aspirin delayed-release 81 mg tablet Take 1 Tab by mouth daily.  acetaminophen (TYLENOL ARTHRITIS) 650 mg CR tablet Take 2 Tabs by mouth every six (6) hours as needed for Pain.  MULTIVITAMIN PO Take  by mouth.  Cholecalciferol, Vitamin D3, (VITAMIN D) 2,000 unit Cap Take  by mouth every other day. Allergies   Allergen Reactions    Aggrastat [Tirofiban] Other (comments)     Queasy.        Family History   Problem Relation Age of Onset    Heart Disease Mother      Social History     Tobacco Use    Smoking status: Former Smoker     Years: 30.00     Types: Cigarettes     Quit date: 1997     Years since quittin.0    Smokeless tobacco: Never Used   Substance Use Topics    Alcohol use: No     Alcohol/week: 0.0 standard drinks

## 2021-01-21 NOTE — PATIENT INSTRUCTIONS
Medicare Wellness Visit, Male The best way to live healthy is to have a lifestyle where you eat a well-balanced diet, exercise regularly, limit alcohol use, and quit all forms of tobacco/nicotine, if applicable. Regular preventive services are another way to keep healthy. Preventive services (vaccines, screening tests, monitoring & exams) can help personalize your care plan, which helps you manage your own care. Screening tests can find health problems at the earliest stages, when they are easiest to treat. [facility] follows the current, evidence-based guidelines published by the [facility] ([facility]) when recommending preventive services for our patients. Because we follow these guidelines, sometimes recommendations change over time as research supports it. (For example, a prostate screening blood test is no longer routinely recommended for men with no symptoms). Of course, you and your doctor may decide to screen more often for some diseases, based on your risk and co-morbidities (chronic disease you are already diagnosed with). Preventive services for you include: - Medicare offers their members a free annual wellness visit, which is time for you and your primary care provider to discuss and plan for your preventive service needs. Take advantage of this benefit every year! 
-All adults over age 72 should receive the recommended pneumonia vaccines. Current USPSTF guidelines recommend a series of two vaccines for the best pneumonia protection.  
-All adults should have a flu vaccine yearly and tetanus vaccine every 10 years. 
-All adults age 48 and older should receive the shingles vaccines (series of two vaccines). -All adults age 38-68 who are overweight should have a diabetes screening test once every three years. -Other screening tests & preventive services for persons with diabetes include: an eye exam to screen for diabetic retinopathy, a kidney function test, a foot exam, and stricter control over your cholesterol.  
-Cardiovascular screening for adults with routine risk involves an electrocardiogram (ECG) at intervals determined by the provider.  
-Colorectal cancer screening should be done for adults age 54-65 with no increased risk factors for colorectal cancer. There are a number of acceptable methods of screening for this type of cancer. Each test has its own benefits and drawbacks. Discuss with your provider what is most appropriate for you during your annual wellness visit. The different tests include: colonoscopy (considered the best screening method), a fecal occult blood test, a fecal DNA test, and sigmoidoscopy. 
-All adults born between [facility] should be screened once for Hepatitis C. 
-An Abdominal Aortic Aneurysm (AAA) Screening is recommended for men age 73-68 who has ever smoked in their lifetime. Here is a list of your current Health Maintenance items (your personalized list of preventive services) with a due date: 
Health Maintenance Due Topic Date Due  
 COVID-19 Vaccine (1 of 2) 05[birth year]17/1957  
 DTaP/Tdap/Td  (1 - Tdap) 05/17/1962  Shingles Vaccine (1 of 2) 05/17/1991  Glaucoma Screening   11/23/2012  Yearly Flu Vaccine (1) 09/01/2020  Cholesterol Test   01/16/2021

## 2021-01-24 DIAGNOSIS — M10.20 DRUG-INDUCED GOUT, UNSPECIFIED CHRONICITY, UNSPECIFIED SITE: ICD-10-CM

## 2021-01-24 RX ORDER — ALLOPURINOL 300 MG/1
TABLET ORAL
Qty: 90 TAB | Refills: 2 | Status: SHIPPED | OUTPATIENT
Start: 2021-01-24 | End: 2021-01-28

## 2021-01-24 RX ORDER — LISINOPRIL 20 MG/1
TABLET ORAL
Qty: 90 TAB | Refills: 2 | Status: SHIPPED | OUTPATIENT
Start: 2021-01-24 | End: 2021-01-28

## 2021-01-28 DIAGNOSIS — M10.20 DRUG-INDUCED GOUT, UNSPECIFIED CHRONICITY, UNSPECIFIED SITE: ICD-10-CM

## 2021-01-28 RX ORDER — ALLOPURINOL 300 MG/1
TABLET ORAL
Qty: 90 TAB | Refills: 2 | Status: SHIPPED | OUTPATIENT
Start: 2021-01-28 | End: 2022-04-26

## 2021-01-28 RX ORDER — LISINOPRIL 20 MG/1
TABLET ORAL
Qty: 90 TAB | Refills: 2 | Status: SHIPPED
Start: 2021-01-28 | End: 2021-09-14 | Stop reason: ALTCHOICE

## 2021-02-21 DIAGNOSIS — I25.10 ATHEROSCLEROSIS OF NATIVE CORONARY ARTERY OF NATIVE HEART WITHOUT ANGINA PECTORIS: ICD-10-CM

## 2021-02-21 RX ORDER — CLOPIDOGREL BISULFATE 75 MG/1
TABLET ORAL
Qty: 90 TAB | Refills: 2 | Status: SHIPPED | OUTPATIENT
Start: 2021-02-21 | End: 2021-11-22

## 2021-03-26 DIAGNOSIS — I25.10 ATHEROSCLEROSIS OF NATIVE CORONARY ARTERY OF NATIVE HEART WITHOUT ANGINA PECTORIS: ICD-10-CM

## 2021-03-26 RX ORDER — CARVEDILOL 25 MG/1
TABLET ORAL
Qty: 180 TAB | Refills: 2 | Status: SHIPPED | OUTPATIENT
Start: 2021-03-26 | End: 2022-01-04 | Stop reason: SDUPTHER

## 2021-03-26 RX ORDER — SIMVASTATIN 40 MG/1
TABLET, FILM COATED ORAL
Qty: 90 TAB | Refills: 2 | Status: SHIPPED | OUTPATIENT
Start: 2021-03-26 | End: 2022-01-04 | Stop reason: SDUPTHER

## 2021-08-13 ENCOUNTER — APPOINTMENT (OUTPATIENT)
Dept: GENERAL RADIOLOGY | Age: 81
End: 2021-08-13
Attending: EMERGENCY MEDICINE
Payer: MEDICARE

## 2021-08-13 ENCOUNTER — HOSPITAL ENCOUNTER (EMERGENCY)
Age: 81
Discharge: HOME | End: 2021-08-13
Attending: EMERGENCY MEDICINE
Payer: MEDICARE

## 2021-08-13 VITALS
SYSTOLIC BLOOD PRESSURE: 122 MMHG | BODY MASS INDEX: 31.15 KG/M2 | TEMPERATURE: 97 F | WEIGHT: 230 LBS | DIASTOLIC BLOOD PRESSURE: 61 MMHG | HEART RATE: 61 BPM | OXYGEN SATURATION: 97 % | HEIGHT: 72 IN | RESPIRATION RATE: 19 BRPM

## 2021-08-13 DIAGNOSIS — R04.2 COUGH WITH HEMOPTYSIS: Primary | ICD-10-CM

## 2021-08-13 LAB
ALBUMIN SERPL-MCNC: 3.9 G/DL (ref 3.5–5)
ALBUMIN/GLOB SERPL: 1.2 {RATIO} (ref 1.1–2.2)
ALP SERPL-CCNC: 118 U/L (ref 45–117)
ALT SERPL-CCNC: 23 U/L (ref 12–78)
ANION GAP SERPL CALC-SCNC: 4 MMOL/L (ref 5–15)
AST SERPL-CCNC: 19 U/L (ref 15–37)
ATRIAL RATE: 68 BPM
BASOPHILS # BLD: 0.1 K/UL (ref 0–0.1)
BASOPHILS NFR BLD: 1 % (ref 0–1)
BILIRUB SERPL-MCNC: 1.1 MG/DL (ref 0.2–1)
BNP SERPL-MCNC: 1069 PG/ML
BUN SERPL-MCNC: 27 MG/DL (ref 6–20)
BUN/CREAT SERPL: 17 (ref 12–20)
CALCIUM SERPL-MCNC: 9.6 MG/DL (ref 8.5–10.1)
CALCULATED P AXIS, ECG09: 88 DEGREES
CALCULATED R AXIS, ECG10: 36 DEGREES
CALCULATED T AXIS, ECG11: 171 DEGREES
CHLORIDE SERPL-SCNC: 105 MMOL/L (ref 97–108)
CO2 SERPL-SCNC: 30 MMOL/L (ref 21–32)
COMMENT, HOLDF: NORMAL
CREAT SERPL-MCNC: 1.59 MG/DL (ref 0.7–1.3)
DIAGNOSIS, 93000: NORMAL
DIFFERENTIAL METHOD BLD: ABNORMAL
EOSINOPHIL # BLD: 0.3 K/UL (ref 0–0.4)
EOSINOPHIL NFR BLD: 4 % (ref 0–7)
ERYTHROCYTE [DISTWIDTH] IN BLOOD BY AUTOMATED COUNT: 15.3 % (ref 11.5–14.5)
GLOBULIN SER CALC-MCNC: 3.3 G/DL (ref 2–4)
GLUCOSE SERPL-MCNC: 102 MG/DL (ref 65–100)
HCT VFR BLD AUTO: 40.7 % (ref 36.6–50.3)
HGB BLD-MCNC: 13.6 G/DL (ref 12.1–17)
IMM GRANULOCYTES # BLD AUTO: 0 K/UL (ref 0–0.04)
IMM GRANULOCYTES NFR BLD AUTO: 0 % (ref 0–0.5)
INR PPP: 1 (ref 0.9–1.1)
LYMPHOCYTES # BLD: 1.1 K/UL (ref 0.8–3.5)
LYMPHOCYTES NFR BLD: 17 % (ref 12–49)
MAGNESIUM SERPL-MCNC: 2.3 MG/DL (ref 1.6–2.4)
MCH RBC QN AUTO: 30.8 PG (ref 26–34)
MCHC RBC AUTO-ENTMCNC: 33.4 G/DL (ref 30–36.5)
MCV RBC AUTO: 92.1 FL (ref 80–99)
MONOCYTES # BLD: 0.8 K/UL (ref 0–1)
MONOCYTES NFR BLD: 11 % (ref 5–13)
NEUTS SEG # BLD: 4.6 K/UL (ref 1.8–8)
NEUTS SEG NFR BLD: 67 % (ref 32–75)
NRBC # BLD: 0 K/UL (ref 0–0.01)
NRBC BLD-RTO: 0 PER 100 WBC
P-R INTERVAL, ECG05: 248 MS
PLATELET # BLD AUTO: 130 K/UL (ref 150–400)
PMV BLD AUTO: 10.8 FL (ref 8.9–12.9)
POTASSIUM SERPL-SCNC: 4.2 MMOL/L (ref 3.5–5.1)
PROT SERPL-MCNC: 7.2 G/DL (ref 6.4–8.2)
PROTHROMBIN TIME: 10.9 SEC (ref 9–11.1)
Q-T INTERVAL, ECG07: 446 MS
QRS DURATION, ECG06: 126 MS
QTC CALCULATION (BEZET), ECG08: 474 MS
RBC # BLD AUTO: 4.42 M/UL (ref 4.1–5.7)
SAMPLES BEING HELD,HOLD: NORMAL
SODIUM SERPL-SCNC: 139 MMOL/L (ref 136–145)
TROPONIN I SERPL-MCNC: <0.05 NG/ML
VENTRICULAR RATE, ECG03: 68 BPM
WBC # BLD AUTO: 6.9 K/UL (ref 4.1–11.1)

## 2021-08-13 PROCEDURE — 85610 PROTHROMBIN TIME: CPT

## 2021-08-13 PROCEDURE — 71045 X-RAY EXAM CHEST 1 VIEW: CPT

## 2021-08-13 PROCEDURE — 36415 COLL VENOUS BLD VENIPUNCTURE: CPT

## 2021-08-13 PROCEDURE — 93005 ELECTROCARDIOGRAM TRACING: CPT

## 2021-08-13 PROCEDURE — 80053 COMPREHEN METABOLIC PANEL: CPT

## 2021-08-13 PROCEDURE — 83880 ASSAY OF NATRIURETIC PEPTIDE: CPT

## 2021-08-13 PROCEDURE — 99284 EMERGENCY DEPT VISIT MOD MDM: CPT

## 2021-08-13 PROCEDURE — 83735 ASSAY OF MAGNESIUM: CPT

## 2021-08-13 PROCEDURE — 85025 COMPLETE CBC W/AUTO DIFF WBC: CPT

## 2021-08-13 PROCEDURE — 84484 ASSAY OF TROPONIN QUANT: CPT

## 2021-08-13 NOTE — ED TRIAGE NOTES
TRIAGE: Pt arrives with c/o chest intermittent pain/tightness in center of chest and spitting up blood that began last night. +sob. Denies N/V, fever. Pt has defibrillator and hx of 5-way bypass. Pt followed by Dr. Nidhi Aguilar.

## 2021-08-16 NOTE — ED PROVIDER NOTES
25-year-old male presents from home via private vehicle accompanied by his son with a complaint of spitting up blood. Patient states he coughed up blood earlier this morning. He felt some chest congestion and tightness that resulted in a coughing fit that produced a small amount of red blood. He did report some chest tightness and shortness of breath. Denies any nausea, vomiting, fever. Has had no episodes of hemoptysis since this 1 event. Denies any nausea or vomiting. No diarrhea or melena. Patient does have a history of coronary artery disease status post bypass surgery, defibrillator, heart failure. He is anticoagulated with Plavix.            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 2010    Essential hypertension     Gout 2010    [facility] failure ([facility] [address]     Ischemic cardiomyopathy     Long term (current) use of antithrombotics/antiplatelets     Mixed hyperlipidemia 2010    S/P placement of cardiac pacemaker 2019       Past Surgical History:   Procedure Laterality Date    BIOPSY PROSTATE      HX CHOLECYSTECTOMY      HX CORONARY ARTERY BYPASS GRAFT  06    [place] CARDIAC SURG PROCEDURE UNLIST      cabg         Family History:   Problem Relation Age of Onset    Heart Disease Mother        Social History     Socioeconomic History    Marital status:      Spouse name: Not on file    Number of children: Not on file    Years of education: Not on file    Highest education level: Not on file   Occupational History    Not on file   Tobacco Use    Smoking status: Former Smoker     Years: 30.00     Types: Cigarettes     Quit date: 1997     Years since quittin.6    Smokeless tobacco: Never Used   Substance and Sexual Activity    Alcohol use: No     Alcohol/week: 0.0 standard drinks    Drug use: No    Sexual activity: Yes     Partners: Female   Other Topics Concern    Not on file   Social History Narrative    Not on file     Social Determinants of Health     Financial Resource Strain:     Difficulty of Paying Living Expenses:    Food Insecurity:     Worried About Running Out of Food in the Last Year:     [address] in the Last Year:    Transportation Needs:     Lack of Transportation (Medical):  Lack of Transportation (Non-Medical):    Physical Activity:     Days of Exercise per Week:     Minutes of Exercise per Session:    Stress:     Feeling of Stress :    Social Connections:     Frequency of Communication with Friends and Family:     Frequency of Social Gatherings with Friends and Family:     Attends Jainism Services:     Active Member of Clubs or Organizations:     Attends Club or Organization Meetings:     Marital Status:    Intimate Partner Violence:     Fear of Current or Ex-Partner:     Emotionally Abused:     Physically Abused:     Sexually Abused: ALLERGIES: Aggrastat [tirofiban]    Review of Systems   Constitutional: Negative for diaphoresis and fever. HENT: Negative for facial swelling. Eyes: Negative for visual disturbance. Respiratory: Positive for cough. Cardiovascular: Negative for chest pain. Gastrointestinal: Negative for abdominal pain. Genitourinary: Negative for dysuria. Musculoskeletal: Negative for joint swelling. Skin: Negative for rash. Neurological: Negative for headaches. Hematological: Negative for adenopathy. Psychiatric/Behavioral: Negative for suicidal ideas. Vitals:    08/13/21 1235 08/13/21 1316 08/13/21 1321 08/13/21 1345   BP: 139/70 (!) 144/93  122/61   Pulse: 65 88 75 61   Resp: 18 24 19 19   Temp: 97 °F (36.1 °C)      SpO2: 96%  97% 97%   Weight: 104.3 kg (230 lb)      Height: 6' (1.829 m)               Physical Exam  Vitals and nursing note reviewed. Constitutional:       General: He is not in acute distress.      Appearance: He is well-developed. HENT:      Head: Normocephalic and atraumatic. Eyes:      Pupils: Pupils are equal, round, and reactive to light. Cardiovascular:      Rate and Rhythm: Normal rate. Pulmonary:      Effort: Pulmonary effort is normal. No respiratory distress. Abdominal:      General: There is no distension. Musculoskeletal:         General: Normal range of motion. Cervical back: Normal range of motion and neck supple. Skin:     General: Skin is warm and dry. Neurological:      Mental Status: He is alert and oriented to person, place, and time. MDM  Number of Diagnoses or Management Options  Cough with hemoptysis  Diagnosis management comments: Assessment: Patient had no further episodes of hemoptysis. He is resting comfortably in the ED with stable vital signs. No tachycardia or hypoxia. Chest x-ray was unremarkable and his blood work was reassuring. I do not see any evidence of ongoing bleeding or hemorrhage. This may have been just an isolated incident. If he has recurrent episodes he should follow-up with his cardiologist or return to the emergency department for further evaluation.        Amount and/or Complexity of Data Reviewed  Clinical lab tests: reviewed  Tests in the radiology section of CPT®: reviewed  Tests in the medicine section of CPT®: reviewed           Procedures

## 2021-09-14 ENCOUNTER — OFFICE VISIT (OUTPATIENT)
Dept: INTERNAL MEDICINE CLINIC | Age: 81
End: 2021-09-14
Payer: MEDICARE

## 2021-09-14 VITALS
BODY MASS INDEX: 30.88 KG/M2 | TEMPERATURE: 97.6 F | HEIGHT: 72 IN | OXYGEN SATURATION: 100 % | RESPIRATION RATE: 16 BRPM | WEIGHT: 228 LBS

## 2021-09-14 DIAGNOSIS — I25.10 ATHEROSCLEROSIS OF NATIVE CORONARY ARTERY OF NATIVE HEART WITHOUT ANGINA PECTORIS: Primary | ICD-10-CM

## 2021-09-14 DIAGNOSIS — M10.20 DRUG-INDUCED GOUT, UNSPECIFIED CHRONICITY, UNSPECIFIED SITE: ICD-10-CM

## 2021-09-14 DIAGNOSIS — I50.22 CHRONIC SYSTOLIC CONGESTIVE HEART FAILURE (HCC): ICD-10-CM

## 2021-09-14 DIAGNOSIS — N18.1 CRI (CHRONIC RENAL INSUFFICIENCY), STAGE 1: ICD-10-CM

## 2021-09-14 DIAGNOSIS — E78.2 MIXED HYPERLIPIDEMIA: ICD-10-CM

## 2021-09-14 DIAGNOSIS — I95.1 ORTHOSTATIC HYPOTENSION: ICD-10-CM

## 2021-09-14 DIAGNOSIS — Z23 ENCOUNTER FOR IMMUNIZATION: ICD-10-CM

## 2021-09-14 DIAGNOSIS — I10 ESSENTIAL HYPERTENSION: ICD-10-CM

## 2021-09-14 LAB
ALBUMIN SERPL-MCNC: 3.6 G/DL (ref 3.5–5)
ALBUMIN/GLOB SERPL: 1.2 {RATIO} (ref 1.1–2.2)
ALP SERPL-CCNC: 111 U/L (ref 45–117)
ALT SERPL-CCNC: 18 U/L (ref 12–78)
ANION GAP SERPL CALC-SCNC: 5 MMOL/L (ref 5–15)
AST SERPL-CCNC: 17 U/L (ref 15–37)
BASOPHILS # BLD: 0.1 K/UL (ref 0–0.1)
BASOPHILS NFR BLD: 1 % (ref 0–1)
BILIRUB DIRECT SERPL-MCNC: 0.3 MG/DL (ref 0–0.2)
BILIRUB SERPL-MCNC: 0.8 MG/DL (ref 0.2–1)
BUN SERPL-MCNC: 33 MG/DL (ref 6–20)
BUN/CREAT SERPL: 19 (ref 12–20)
CALCIUM SERPL-MCNC: 9.2 MG/DL (ref 8.5–10.1)
CHLORIDE SERPL-SCNC: 110 MMOL/L (ref 97–108)
CHOLEST SERPL-MCNC: 117 MG/DL
CO2 SERPL-SCNC: 28 MMOL/L (ref 21–32)
CREAT SERPL-MCNC: 1.78 MG/DL (ref 0.7–1.3)
DIFFERENTIAL METHOD BLD: ABNORMAL
EOSINOPHIL # BLD: 0.2 K/UL (ref 0–0.4)
EOSINOPHIL NFR BLD: 3 % (ref 0–7)
ERYTHROCYTE [DISTWIDTH] IN BLOOD BY AUTOMATED COUNT: 15.2 % (ref 11.5–14.5)
GLOBULIN SER CALC-MCNC: 3 G/DL (ref 2–4)
GLUCOSE SERPL-MCNC: 103 MG/DL (ref 65–100)
HCT VFR BLD AUTO: 40.7 % (ref 36.6–50.3)
HDLC SERPL-MCNC: 29 MG/DL
HDLC SERPL: 4 {RATIO} (ref 0–5)
HGB BLD-MCNC: 12.8 G/DL (ref 12.1–17)
IMM GRANULOCYTES # BLD AUTO: 0.1 K/UL (ref 0–0.04)
IMM GRANULOCYTES NFR BLD AUTO: 1 % (ref 0–0.5)
LDLC SERPL CALC-MCNC: 53.4 MG/DL (ref 0–100)
LYMPHOCYTES # BLD: 0.8 K/UL (ref 0.8–3.5)
LYMPHOCYTES NFR BLD: 13 % (ref 12–49)
MCH RBC QN AUTO: 31.2 PG (ref 26–34)
MCHC RBC AUTO-ENTMCNC: 31.4 G/DL (ref 30–36.5)
MCV RBC AUTO: 99.3 FL (ref 80–99)
MONOCYTES # BLD: 0.7 K/UL (ref 0–1)
MONOCYTES NFR BLD: 11 % (ref 5–13)
NEUTS SEG # BLD: 4.1 K/UL (ref 1.8–8)
NEUTS SEG NFR BLD: 71 % (ref 32–75)
NRBC # BLD: 0 K/UL (ref 0–0.01)
NRBC BLD-RTO: 0 PER 100 WBC
PLATELET # BLD AUTO: 135 K/UL (ref 150–400)
PMV BLD AUTO: 11.7 FL (ref 8.9–12.9)
POTASSIUM SERPL-SCNC: 4.5 MMOL/L (ref 3.5–5.1)
PROT SERPL-MCNC: 6.6 G/DL (ref 6.4–8.2)
RBC # BLD AUTO: 4.1 M/UL (ref 4.1–5.7)
RBC MORPH BLD: ABNORMAL
SODIUM SERPL-SCNC: 143 MMOL/L (ref 136–145)
TRIGL SERPL-MCNC: 173 MG/DL (ref ?–150)
TSH SERPL DL<=0.05 MIU/L-ACNC: 0.58 UIU/ML (ref 0.36–3.74)
URATE SERPL-MCNC: 6 MG/DL (ref 3.5–7.2)
VLDLC SERPL CALC-MCNC: 34.6 MG/DL
WBC # BLD AUTO: 6 K/UL (ref 4.1–11.1)

## 2021-09-14 PROCEDURE — G8536 NO DOC ELDER MAL SCRN: HCPCS | Performed by: INTERNAL MEDICINE

## 2021-09-14 PROCEDURE — 1101F PT FALLS ASSESS-DOCD LE1/YR: CPT | Performed by: INTERNAL MEDICINE

## 2021-09-14 PROCEDURE — 99214 OFFICE O/P EST MOD 30 MIN: CPT | Performed by: INTERNAL MEDICINE

## 2021-09-14 PROCEDURE — G8756 NO BP MEASURE DOC: HCPCS | Performed by: INTERNAL MEDICINE

## 2021-09-14 PROCEDURE — G8427 DOCREV CUR MEDS BY ELIG CLIN: HCPCS | Performed by: INTERNAL MEDICINE

## 2021-09-14 PROCEDURE — G0463 HOSPITAL OUTPT CLINIC VISIT: HCPCS | Performed by: INTERNAL MEDICINE

## 2021-09-14 PROCEDURE — 90694 VACC AIIV4 NO PRSRV 0.5ML IM: CPT | Performed by: INTERNAL MEDICINE

## 2021-09-14 PROCEDURE — G8510 SCR DEP NEG, NO PLAN REQD: HCPCS | Performed by: INTERNAL MEDICINE

## 2021-09-14 PROCEDURE — G8417 CALC BMI ABV UP PARAM F/U: HCPCS | Performed by: INTERNAL MEDICINE

## 2021-09-14 RX ORDER — LOSARTAN POTASSIUM 50 MG/1
25 TABLET ORAL DAILY
COMMUNITY
Start: 2021-09-14

## 2021-09-14 RX ORDER — SPIRONOLACTONE 25 MG/1
25 TABLET ORAL DAILY
COMMUNITY

## 2021-09-14 RX ORDER — LOSARTAN POTASSIUM 50 MG/1
TABLET ORAL DAILY
COMMUNITY
End: 2021-09-14

## 2021-09-14 NOTE — PROGRESS NOTES
Wagner Gonzalez  is a [de-identified] y.o.  male  who present for routine immunizations. Prior to vaccine administration: Consent was obtained. Risks and adverse reactions were discussed. The patient was provided the VIS and they were given an opportunity to ask questions; all questions were addressed. He  denies any symptoms, reactions or allergies that would exclude them from being immunized today. There were no adverse reactions observed post vaccination. Patient was advised to seek medical or call the office with any questions or concerns post vaccination. Patient verbalized understanding.   Anamaria Yao LPN

## 2021-09-15 DIAGNOSIS — N28.9 ABNORMAL KIDNEY FUNCTION: Primary | ICD-10-CM

## 2021-09-30 ENCOUNTER — APPOINTMENT (OUTPATIENT)
Dept: INTERNAL MEDICINE CLINIC | Age: 81
End: 2021-09-30

## 2021-09-30 DIAGNOSIS — N28.9 ABNORMAL KIDNEY FUNCTION: ICD-10-CM

## 2021-09-30 LAB
ANION GAP SERPL CALC-SCNC: 7 MMOL/L (ref 5–15)
BUN SERPL-MCNC: 33 MG/DL (ref 6–20)
BUN/CREAT SERPL: 20 (ref 12–20)
CALCIUM SERPL-MCNC: 8.9 MG/DL (ref 8.5–10.1)
CHLORIDE SERPL-SCNC: 107 MMOL/L (ref 97–108)
CO2 SERPL-SCNC: 28 MMOL/L (ref 21–32)
CREAT SERPL-MCNC: 1.68 MG/DL (ref 0.7–1.3)
GLUCOSE SERPL-MCNC: 113 MG/DL (ref 65–100)
POTASSIUM SERPL-SCNC: 4 MMOL/L (ref 3.5–5.1)
SODIUM SERPL-SCNC: 142 MMOL/L (ref 136–145)

## 2021-11-22 DIAGNOSIS — I25.10 ATHEROSCLEROSIS OF NATIVE CORONARY ARTERY OF NATIVE HEART WITHOUT ANGINA PECTORIS: ICD-10-CM

## 2021-11-22 RX ORDER — CLOPIDOGREL BISULFATE 75 MG/1
TABLET ORAL
Qty: 90 TABLET | Refills: 2 | Status: SHIPPED | OUTPATIENT
Start: 2021-11-22 | End: 2022-08-10

## 2022-01-04 DIAGNOSIS — I25.10 ATHEROSCLEROSIS OF NATIVE CORONARY ARTERY OF NATIVE HEART WITHOUT ANGINA PECTORIS: ICD-10-CM

## 2022-01-04 RX ORDER — SIMVASTATIN 40 MG/1
TABLET, FILM COATED ORAL
Qty: 90 TABLET | Refills: 2 | Status: SHIPPED | OUTPATIENT
Start: 2022-01-04 | End: 2022-09-06

## 2022-01-04 RX ORDER — CARVEDILOL 25 MG/1
TABLET ORAL
Qty: 180 TABLET | Refills: 2 | Status: SHIPPED | OUTPATIENT
Start: 2022-01-04 | End: 2022-09-29

## 2022-02-04 RX ORDER — SILDENAFIL 100 MG/1
TABLET, FILM COATED ORAL
Qty: 30 TABLET | Refills: 4 | Status: SHIPPED | OUTPATIENT
Start: 2022-02-04

## 2022-02-16 RX ORDER — FUROSEMIDE 80 MG/1
40 TABLET ORAL DAILY
Qty: 90 TABLET | Refills: 2 | Status: SHIPPED | OUTPATIENT
Start: 2022-02-16 | End: 2022-07-21

## 2022-03-18 PROBLEM — Z95.0 S/P PLACEMENT OF CARDIAC PACEMAKER: Status: ACTIVE | Noted: 2019-01-24

## 2022-03-19 PROBLEM — Z53.20 COLONOSCOPY REFUSED: Status: ACTIVE | Noted: 2017-04-20

## 2022-03-19 PROBLEM — Z28.21 INFLUENZA VACCINE REFUSED: Status: ACTIVE | Noted: 2017-04-20

## 2022-04-26 DIAGNOSIS — M10.20 DRUG-INDUCED GOUT, UNSPECIFIED CHRONICITY, UNSPECIFIED SITE: ICD-10-CM

## 2022-04-26 RX ORDER — ALLOPURINOL 300 MG/1
TABLET ORAL
Qty: 90 TABLET | Refills: 2 | Status: SHIPPED | OUTPATIENT
Start: 2022-04-26

## 2022-07-20 ENCOUNTER — OFFICE VISIT (OUTPATIENT)
Dept: INTERNAL MEDICINE CLINIC | Age: 82
End: 2022-07-20
Payer: MEDICARE

## 2022-07-20 VITALS
HEIGHT: 72 IN | OXYGEN SATURATION: 97 % | TEMPERATURE: 97.5 F | BODY MASS INDEX: 31.56 KG/M2 | RESPIRATION RATE: 14 BRPM | WEIGHT: 233 LBS

## 2022-07-20 DIAGNOSIS — I10 PRIMARY HYPERTENSION: ICD-10-CM

## 2022-07-20 DIAGNOSIS — I50.22 CHRONIC SYSTOLIC CONGESTIVE HEART FAILURE (HCC): ICD-10-CM

## 2022-07-20 DIAGNOSIS — Z00.00 MEDICARE ANNUAL WELLNESS VISIT, SUBSEQUENT: Primary | ICD-10-CM

## 2022-07-20 DIAGNOSIS — N18.30 STAGE 3 CHRONIC KIDNEY DISEASE, UNSPECIFIED WHETHER STAGE 3A OR 3B CKD (HCC): ICD-10-CM

## 2022-07-20 DIAGNOSIS — M1A.29X0 DRUG-INDUCED CHRONIC GOUT OF MULTIPLE SITES WITHOUT TOPHUS: ICD-10-CM

## 2022-07-20 DIAGNOSIS — Z71.89 ACP (ADVANCE CARE PLANNING): ICD-10-CM

## 2022-07-20 DIAGNOSIS — E78.2 MIXED HYPERLIPIDEMIA: ICD-10-CM

## 2022-07-20 DIAGNOSIS — I25.10 ATHEROSCLEROSIS OF NATIVE CORONARY ARTERY OF NATIVE HEART WITHOUT ANGINA PECTORIS: ICD-10-CM

## 2022-07-20 PROCEDURE — G8427 DOCREV CUR MEDS BY ELIG CLIN: HCPCS | Performed by: INTERNAL MEDICINE

## 2022-07-20 PROCEDURE — G8536 NO DOC ELDER MAL SCRN: HCPCS | Performed by: INTERNAL MEDICINE

## 2022-07-20 PROCEDURE — G8510 SCR DEP NEG, NO PLAN REQD: HCPCS | Performed by: INTERNAL MEDICINE

## 2022-07-20 PROCEDURE — 1101F PT FALLS ASSESS-DOCD LE1/YR: CPT | Performed by: INTERNAL MEDICINE

## 2022-07-20 PROCEDURE — G8417 CALC BMI ABV UP PARAM F/U: HCPCS | Performed by: INTERNAL MEDICINE

## 2022-07-20 PROCEDURE — G0439 PPPS, SUBSEQ VISIT: HCPCS | Performed by: INTERNAL MEDICINE

## 2022-07-20 PROCEDURE — G0463 HOSPITAL OUTPT CLINIC VISIT: HCPCS | Performed by: INTERNAL MEDICINE

## 2022-07-20 PROCEDURE — 99214 OFFICE O/P EST MOD 30 MIN: CPT | Performed by: INTERNAL MEDICINE

## 2022-07-20 PROCEDURE — G8756 NO BP MEASURE DOC: HCPCS | Performed by: INTERNAL MEDICINE

## 2022-07-20 RX ORDER — TETANUS TOXOID, REDUCED DIPHTHERIA TOXOID AND ACELLULAR PERTUSSIS VACCINE, ADSORBED 5; 2.5; 8; 8; 2.5 [IU]/.5ML; [IU]/.5ML; UG/.5ML; UG/.5ML; UG/.5ML
0.5 SUSPENSION INTRAMUSCULAR ONCE
Qty: 0.5 ML | Refills: 0 | Status: SHIPPED | OUTPATIENT
Start: 2022-07-20 | End: 2022-07-20

## 2022-07-20 RX ORDER — ZOSTER VACCINE RECOMBINANT, ADJUVANTED 50 MCG/0.5
0.5 KIT INTRAMUSCULAR ONCE
Qty: 0.5 ML | Refills: 1 | Status: SHIPPED | OUTPATIENT
Start: 2022-07-20 | End: 2022-07-20

## 2022-07-20 NOTE — PROGRESS NOTES
This is the Subsequent Medicare Annual Wellness Exam, performed 12 months or more after the Initial AWV or the last Subsequent AWV    I have reviewed the patient's medical history in detail and updated the computerized patient record. AS well as a follow up of his health issues. Some ongoing issues with dizziness and lightheadedness. He feels weak a lot and his blood pressures have been down with readings in the 28A systolic. His weight has been stable. Minimal dyspnea on exertion. No PND or orthopnea. No nausea or vomiting. No change in bowel or bladder habits. Some cough with some occasional white to yellow sputum. ROS - Per HPI    Physical Examination: General appearance - alert, well appearing, and in no distress  Ears - bilateral TM's and external ear canals normal  Mouth - mucous membranes moist, pharynx normal without lesions  Neck - supple, no significant adenopathy  Lymphatics - no palpable lymphadenopathy, no hepatosplenomegaly  Chest - clear to auscultation, no wheezes, rales or rhonchi, symmetric air entry  Heart - normal rate, regular rhythm, normal S1, S2, no murmurs, rubs, clicks or gallops  Abdomen - soft, nontender, nondistended, no masses or organomegaly  Neurological - alert, oriented, normal speech, no focal findings or movement disorder noted  Musculoskeletal - no joint tenderness, deformity or swelling  Extremities - peripheral pulses normal, no pedal edema, no clubbing or cyanosis        Assessment/Plan   Education and counseling provided:  Are appropriate based on today's review and evaluation  End-of-Life planning (with patient's consent)    1. Medicare annual wellness visit, subsequent  2. ACP (advance care planning)  -     REFERRAL TO ACP CLINICAL SPECIALIST  3. Stage 3 chronic kidney disease, unspecified whether stage 3a or 3b CKD ([address] repeat renal function test now. -     NT-PRO BNP; Future  -     METABOLIC PANEL, COMPREHENSIVE; Future  4.  Chronic systolic congestive heart failure (HCC)-appears tightly controlled. If BNP is down and renal function is stable, will consider reducing either carvedilol or Lasix to help with his low blood pressure.  -     NT-PRO BNP; Future  -     METABOLIC PANEL, COMPREHENSIVE; Future  5. Primary hypertension-per above. 6. Mixed hyperlipidemia-at goal of less than 100 on labs done in the fall. We will repeat those in the fall. 7. Atherosclerosis of native coronary artery of native heart without angina pectoris-stable. 8. 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 Depression Risk Factor Screening     3 most recent PHQ Screens 7/20/2022   Little interest or pleasure in doing things Not at all   Feeling down, depressed, irritable, or hopeless Not at all   Total Score PHQ 2 0       Alcohol & Drug Abuse Risk Screen    Do you average more than 1 drink per night or more than 7 drinks a week: No    In the past three months have you have had more than 4 drinks containing alcohol on one occasion: No          Functional Ability and Level of Safety    Hearing: Hearing is good. Activities of Daily Living: The home contains: no safety equipment. Patient does total self care      Ambulation: with no difficulty     Fall Risk:  Fall Risk Assessment, last 12 mths 7/20/2022   Able to walk? Yes   Fall in past 12 months? 0   Do you feel unsteady?  0   Are you worried about falling 0      Abuse Screen:  Patient is not abused       Cognitive Screening    Has your family/caregiver stated any concerns about your memory: no         Health Maintenance Due     Health Maintenance Due   Topic Date Due    DTaP/Tdap/Td series (1 - Tdap) Never done    Shingrix Vaccine Age 50> (1 of 2) Never done    Pneumococcal 65+ years (2 - PCV) 01/13/2010    COVID-19 Vaccine (2 - Booster for Cecilia series) 05/27/2021       Patient Care Team   Patient Care Team:  Alvarez Thapa MD as PCP - General  Alvarez Thapa MD as PCP - REHABILITATION 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     Ischemic cardiomyopathy     Long term (current) use of antithrombotics/antiplatelets     Mixed hyperlipidemia 8/20/2010    S/P placement of cardiac pacemaker 1/24/2019      Past Surgical History:   Procedure Laterality Date    BIOPSY PROSTATE      HX CHOLECYSTECTOMY      HX CORONARY ARTERY BYPASS GRAFT  12/26/06    CA CARDIAC SURG PROCEDURE UNLIST      cabg     Current Outpatient Medications   Medication Sig Dispense Refill    varicella-zoster recombinant, PF, (Shingrix, PF,) 50 mcg/0.5 mL susr injection 0.5 mL by IntraMUSCular route once for 1 dose. 0.5 mL 1    diphth,pertus,acell,,tetanus (Boostrix Tdap) 2.5-8-5 Lf-mcg-Lf/0.5mL susp suspension 0.5 mL by IntraMUSCular route once for 1 dose. Indications: vaccination to prevent diphtheria, pertussis and tetanus 0.5 mL 0    pneumococcal 20-demetrius conj-dip, PF, (PREVNAR 20) 0.5 mL syrg injection 0.5 mL by IntraMUSCular route once for 1 dose. 1 Each 0    allopurinoL (ZYLOPRIM) 300 mg tablet TAKE ONE TABLET BY MOUTH DAILY 90 Tablet 2    furosemide (LASIX) 80 mg tablet Take 0.5 Tablets by mouth daily.  90 Tablet 2    sildenafil citrate (VIAGRA) 100 mg tablet TAKE ONE TABLET BY MOUTH AS NEEDED FOR ERECTILE DYSFUNCTION 30 Tablet 4    carvediloL (COREG) 25 mg tablet TAKE ONE TABLET BY MOUTH TWICE A DAY WITH MEALS 180 Tablet 2    simvastatin (ZOCOR) 40 mg tablet TAKE ONE TABLET BY MOUTH ONCE NIGHTLY 90 Tablet 2    clopidogreL (PLAVIX) 75 mg tab TAKE ONE TABLET BY MOUTH DAILY 90 Tablet 2    spironolactone (ALDACTONE) 25 mg tablet Take 25 mg by mouth daily. losartan (COZAAR) 50 mg tablet Take 0.5 Tablets by mouth daily. GLUCOSAMINE-CONDROITIN-HERB182 PO Take  by mouth two (2) times a day. colchicine 0.6 mg tablet Take 1 tablet by mouth two (2) times daily as needed. 180 tablet 3    aspirin delayed-release 81 mg tablet Take 1 Tab by mouth daily. MULTIVITAMIN PO Take  by mouth. cholecalciferol (VITAMIN D3) (2,000 UNITS /50 MCG) cap capsule Take  by mouth every other day. Allergies   Allergen Reactions    Aggrastat [Tirofiban] Other (comments)     Queasy.        Family History   Problem Relation Age of Onset    Heart Disease Mother      Social History     Tobacco Use    Smoking status: Former     Years: 30.00     Types: Cigarettes     Quit date: 1997     Years since quittin.5    Smokeless tobacco: Never   Substance Use Topics    Alcohol use: Yes     Comment: 1 per month         Yesenia Pardo MD

## 2022-07-20 NOTE — PATIENT INSTRUCTIONS
Medicare Wellness Visit, Male    The best way to live healthy is to have a lifestyle where you eat a well-balanced diet, exercise regularly, limit alcohol use, and quit all forms of tobacco/nicotine, if applicable. Regular preventive services are another way to keep healthy. Preventive services (vaccines, screening tests, monitoring & exams) can help personalize your care plan, which helps you manage your own care. Screening tests can find health problems at the earliest stages, when they are easiest to treat. [facility] follows the current, evidence-based guidelines published by the [facility] ([facility]) when recommending preventive services for our patients. Because we follow these guidelines, sometimes recommendations change over time as research supports it. (For example, a prostate screening blood test is no longer routinely recommended for men with no symptoms). Of course, you and your doctor may decide to screen more often for some diseases, based on your risk and co-morbidities (chronic disease you are already diagnosed with). Preventive services for you include:  - Medicare offers their members a free annual wellness visit, which is time for you and your primary care provider to discuss and plan for your preventive service needs. Take advantage of this benefit every year!  -All adults over age 72 should receive the recommended pneumonia vaccines. Current USPSTF guidelines recommend a series of two vaccines for the best pneumonia protection.   -All adults should have a flu vaccine yearly and tetanus vaccine every 10 years.  -All adults age 48 and older should receive the shingles vaccines (series of two vaccines).        -All adults age 38-68 who are overweight should have a diabetes screening test once every three years.   -Other screening tests & preventive services for persons with diabetes include: an eye exam to screen for diabetic retinopathy, a kidney function test, a foot exam, and stricter control over your cholesterol.   -Cardiovascular screening for adults with routine risk involves an electrocardiogram (ECG) at intervals determined by the provider.   -Colorectal cancer screening should be done for adults age 54-65 with no increased risk factors for colorectal cancer. There are a number of acceptable methods of screening for this type of cancer. Each test has its own benefits and drawbacks. Discuss with your provider what is most appropriate for you during your annual wellness visit. The different tests include: colonoscopy (considered the best screening method), a fecal occult blood test, a fecal DNA test, and sigmoidoscopy.  -All adults born between [facility] should be screened once for Hepatitis C.  -An Abdominal Aortic Aneurysm (AAA) Screening is recommended for men age 73-68 who has ever smoked in their lifetime.      Here is a list of your current Health Maintenance items (your personalized list of preventive services) with a due date:  Health Maintenance Due   Topic Date Due    DTaP/Tdap/Td  (1 - Tdap) Never done    Shingles Vaccine (1 of 2) Never done    Pneumococcal Vaccine (2 - PCV) 01/13/2010    COVID-19 Vaccine (2 - Booster for Cecilia series) 05/27/2021

## 2022-07-21 DIAGNOSIS — N18.30 STAGE 3 CHRONIC KIDNEY DISEASE, UNSPECIFIED WHETHER STAGE 3A OR 3B CKD (HCC): Primary | ICD-10-CM

## 2022-07-21 RX ORDER — FUROSEMIDE 80 MG/1
TABLET ORAL
Qty: 90 TABLET | Refills: 2
Start: 2022-07-21 | End: 2022-10-26 | Stop reason: DRUGHIGH

## 2022-07-22 ENCOUNTER — PATIENT OUTREACH (OUTPATIENT)
Dept: CASE MANAGEMENT | Age: 82
End: 2022-07-22

## 2022-07-22 NOTE — ACP (ADVANCE CARE PLANNING)
Advance Care Planning   Ambulatory ACP Specialist Patient Outreach    Date:  7/22/2022    ACP Specialist:  Hiral Dove LPN    Outreach call to patient in follow-up to ACP Specialist referral from:    [x] PCP  [] Provider   [] Ambulatory Care Management [] Other     For:                  [x] Advance Directive Assistance              [] Complete Portable DNR order              [] Complete POST/MOST              [] Code Status Discussion             [] Discuss Goals of Care             [] Early ACP Decision-Making              [] Other (Specify)    Date Referral Received: 7/22/22    Today's Outreach:  [x] First   [] Second  [] Third       Third outreach made by: [] Phone  [] Email / mail    [] MyChart     Intervention:  [] Spoke with Patient   [] Left VM requesting return call      Outcome: The first attempt was made to contact pt regarding the ACP referral. No answer on mobile phone. VM is not available at this time. Will attempt second outreach within one week     Next Step:   [] ACP scheduled conversation  [x] Outreach again in one week               [] Email / Mail [address] [place]  [] Email / Mail Advance Directive   [] Closing referral.  Routing closure to referring provider/staff and to ACP Specialist . [] Closure letter mailed to patient with invitation to contact ACP Specialist if / when ready.   Thank you for this referral.

## 2022-07-28 ENCOUNTER — PATIENT OUTREACH (OUTPATIENT)
Dept: CASE MANAGEMENT | Age: 82
End: 2022-07-28

## 2022-07-28 NOTE — ACP (ADVANCE CARE PLANNING)
Advance Care Planning   Ambulatory ACP Specialist Patient Outreach    Date:  7/28/2022    ACP Specialist:  Sasha Clay LPN    Outreach call to patient in follow-up to ACP Specialist referral from:    [x] PCP  [] Provider   [] Ambulatory Care Management [] Other     For:                  [x] Advance Directive Assistance              [] Complete Portable DNR order              [] Complete POST/MOST              [] Code Status Discussion             [] Discuss Goals of Care             [] Early ACP Decision-Making              [] Other (Specify)    Date Referral Received:7/21/22    Today's Outreach:  [] First   [x] Second  [] Third       Third outreach made by: [] Phone  [] Email / mail    [] [facility]     Intervention:  [] Spoke with Patient   [] Left VM requesting return call      Outcome: The second attempt was made to contact pt regarding ACP referral. No answer on mobile phone. VM not available. Will outreach again within one week. Next Step:   [] ACP scheduled conversation  [x] Outreach again in one week               [] Email / Mail ACP Info Sheets  [] Email / Mail Advance Directive   [] Closing referral.  Routing closure to referring provider/staff and to ACP Specialist . [] Closure letter mailed to patient with invitation to contact ACP Specialist if / when ready.   Thank you for this referral.

## 2022-08-02 ENCOUNTER — PATIENT OUTREACH (OUTPATIENT)
Dept: CASE MANAGEMENT | Age: 82
End: 2022-08-02

## 2022-08-02 NOTE — ACP (ADVANCE CARE PLANNING)
Advance Care Planning   Ambulatory ACP Specialist Patient Outreach    Date:  8/2/2022    ACP Specialist:  Annalise Carbajal LPN    Outreach call to patient in follow-up to ACP Specialist referral from:    [x] PCP  [] Provider   [] Ambulatory Care Management [] Other     For:                  [x] Advance Directive Assistance              [] Complete Portable DNR order              [] Complete POST/MOST              [] Code Status Discussion             [] Discuss Goals of Care             [] Early ACP Decision-Making              [] Other (Specify)    Date Referral Received:7/21/22    Today's Outreach:  [] First   [] Second  [x] Third       Third outreach made by: [] Phone  [x] Email / mail    [] [employer]     Intervention:  [] Spoke with Patient   [] Left VM requesting return call      Outcome: The final attempt was made to reach the pt. ACP documents sent to pt via e-mail. LPN's contact information was included. We will close the referral at this time. Next Step:   [] ACP scheduled conversation  [] Outreach again in one week               [x] Email / Mail ACP Info Sheets  [] Email / Mail Advance Directive   [x] Closing referral.  Routing closure to referring provider/staff and to ACP Specialist . [x] Closure letter mailed to patient with invitation to contact ACP Specialist if / when ready.   Thank you for this referral.

## 2022-08-10 ENCOUNTER — TELEPHONE (OUTPATIENT)
Dept: INTERNAL MEDICINE CLINIC | Age: 82
End: 2022-08-10

## 2022-08-10 DIAGNOSIS — N18.30 STAGE 3 CHRONIC KIDNEY DISEASE, UNSPECIFIED WHETHER STAGE 3A OR 3B CKD (HCC): Primary | ICD-10-CM

## 2022-08-10 DIAGNOSIS — I25.10 ATHEROSCLEROSIS OF NATIVE CORONARY ARTERY OF NATIVE HEART WITHOUT ANGINA PECTORIS: ICD-10-CM

## 2022-08-10 RX ORDER — CLOPIDOGREL BISULFATE 75 MG/1
TABLET ORAL
Qty: 90 TABLET | Refills: 2 | Status: SHIPPED | OUTPATIENT
Start: 2022-08-10

## 2022-08-10 NOTE — TELEPHONE ENCOUNTER
Spoke with patient and notified [facility] required records prior to scheduling appointment. Advised he will be called by their office for appointment. He will let me know if he has not heard anything in 1-2 weeks. 10

## 2022-08-10 NOTE — TELEPHONE ENCOUNTER
Reason for call:  pt is calling regarding appt he is trying to make with neurology that dr Joan Morales recommended .   Not able to reach them or leave a message for a call back     Is this a new problem: yes     Date of last appointment:  7/20/2022     Can we respond via [employer]: no    Best call back number:    Rosemary Solorzano (Self) [phone] (Home)

## 2022-08-10 NOTE — TELEPHONE ENCOUNTER
Spoke with [facility]. They will need to records faxed and once reviewed they will call pt for appointment. Faxed demographics, insurance, last two office notes, and most recent labs to Fax# [phone] with confirmation received.

## 2022-08-10 NOTE — TELEPHONE ENCOUNTER
Orders Placed This Encounter    [facility] [address] [place] [address]     Referral Priority:   Routine     Referral Type:   Consultation     Referral Reason:   Specialty Services Required     Referred to Provider:   Ben Mora MD     Number of Visits Requested:   1     The above orders were approved via [facility] per Dr. Pati Block, III.

## 2022-09-06 DIAGNOSIS — I25.10 ATHEROSCLEROSIS OF NATIVE CORONARY ARTERY OF NATIVE HEART WITHOUT ANGINA PECTORIS: ICD-10-CM

## 2022-09-06 RX ORDER — SIMVASTATIN 40 MG/1
TABLET, FILM COATED ORAL
Qty: 90 TABLET | Refills: 2 | Status: SHIPPED | OUTPATIENT
Start: 2022-09-06

## 2022-09-27 ENCOUNTER — HOSPITAL ENCOUNTER (EMERGENCY)
Age: 82
Discharge: HOME | End: 2022-09-28
Attending: STUDENT IN AN ORGANIZED HEALTH CARE EDUCATION/TRAINING PROGRAM
Payer: MEDICARE

## 2022-09-27 VITALS
DIASTOLIC BLOOD PRESSURE: 57 MMHG | HEART RATE: 71 BPM | SYSTOLIC BLOOD PRESSURE: 127 MMHG | OXYGEN SATURATION: 97 % | TEMPERATURE: 97.6 F | RESPIRATION RATE: 16 BRPM

## 2022-09-27 DIAGNOSIS — R42 DIZZINESS: Primary | ICD-10-CM

## 2022-09-27 LAB
ALBUMIN SERPL-MCNC: 3.6 G/DL (ref 3.5–5)
ALBUMIN/GLOB SERPL: 1.1 {RATIO} (ref 1.1–2.2)
ALP SERPL-CCNC: 114 U/L (ref 45–117)
ALT SERPL-CCNC: 18 U/L (ref 12–78)
ANION GAP SERPL CALC-SCNC: 6 MMOL/L (ref 5–15)
AST SERPL-CCNC: 18 U/L (ref 15–37)
BASOPHILS # BLD: 0.1 K/UL (ref 0–0.1)
BASOPHILS NFR BLD: 1 % (ref 0–1)
BILIRUB SERPL-MCNC: 0.7 MG/DL (ref 0.2–1)
BUN SERPL-MCNC: 24 MG/DL (ref 6–20)
BUN/CREAT SERPL: 13 (ref 12–20)
CALCIUM SERPL-MCNC: 9 MG/DL (ref 8.5–10.1)
CHLORIDE SERPL-SCNC: 106 MMOL/L (ref 97–108)
CO2 SERPL-SCNC: 27 MMOL/L (ref 21–32)
COMMENT, HOLDF: NORMAL
CREAT SERPL-MCNC: 1.91 MG/DL (ref 0.7–1.3)
DIFFERENTIAL METHOD BLD: ABNORMAL
EOSINOPHIL # BLD: 0.2 K/UL (ref 0–0.4)
EOSINOPHIL NFR BLD: 3 % (ref 0–7)
ERYTHROCYTE [DISTWIDTH] IN BLOOD BY AUTOMATED COUNT: 14.7 % (ref 11.5–14.5)
GLOBULIN SER CALC-MCNC: 3.4 G/DL (ref 2–4)
GLUCOSE SERPL-MCNC: 145 MG/DL (ref 65–100)
HCT VFR BLD AUTO: 39.3 % (ref 36.6–50.3)
HGB BLD-MCNC: 12.7 G/DL (ref 12.1–17)
IMM GRANULOCYTES # BLD AUTO: 0 K/UL (ref 0–0.04)
IMM GRANULOCYTES NFR BLD AUTO: 0 % (ref 0–0.5)
LYMPHOCYTES # BLD: 0.7 K/UL (ref 0.8–3.5)
LYMPHOCYTES NFR BLD: 9 % (ref 12–49)
MCH RBC QN AUTO: 30.7 PG (ref 26–34)
MCHC RBC AUTO-ENTMCNC: 32.3 G/DL (ref 30–36.5)
MCV RBC AUTO: 94.9 FL (ref 80–99)
MONOCYTES # BLD: 0.6 K/UL (ref 0–1)
MONOCYTES NFR BLD: 8 % (ref 5–13)
NEUTS SEG # BLD: 6.5 K/UL (ref 1.8–8)
NEUTS SEG NFR BLD: 79 % (ref 32–75)
NRBC # BLD: 0 K/UL (ref 0–0.01)
NRBC BLD-RTO: 0 PER 100 WBC
PLATELET # BLD AUTO: 152 K/UL (ref 150–400)
PMV BLD AUTO: 11.5 FL (ref 8.9–12.9)
POTASSIUM SERPL-SCNC: 4 MMOL/L (ref 3.5–5.1)
PROT SERPL-MCNC: 7 G/DL (ref 6.4–8.2)
RBC # BLD AUTO: 4.14 M/UL (ref 4.1–5.7)
RBC MORPH BLD: ABNORMAL
SAMPLES BEING HELD,HOLD: NORMAL
SODIUM SERPL-SCNC: 139 MMOL/L (ref 136–145)
TROPONIN-HIGH SENSITIVITY: 10 NG/L (ref 0–76)
WBC # BLD AUTO: 8.1 K/UL (ref 4.1–11.1)

## 2022-09-27 PROCEDURE — 80053 COMPREHEN METABOLIC PANEL: CPT

## 2022-09-27 PROCEDURE — 36415 COLL VENOUS BLD VENIPUNCTURE: CPT

## 2022-09-27 PROCEDURE — 93005 ELECTROCARDIOGRAM TRACING: CPT

## 2022-09-27 PROCEDURE — 99284 EMERGENCY DEPT VISIT MOD MDM: CPT

## 2022-09-27 PROCEDURE — 85025 COMPLETE CBC W/AUTO DIFF WBC: CPT

## 2022-09-27 PROCEDURE — 84484 ASSAY OF TROPONIN QUANT: CPT

## 2022-09-28 LAB
ATRIAL RATE: 69 BPM
CALCULATED P AXIS, ECG09: 79 DEGREES
CALCULATED R AXIS, ECG10: 38 DEGREES
CALCULATED T AXIS, ECG11: -122 DEGREES
DIAGNOSIS, 93000: NORMAL
P-R INTERVAL, ECG05: 260 MS
Q-T INTERVAL, ECG07: 440 MS
QRS DURATION, ECG06: 128 MS
QTC CALCULATION (BEZET), ECG08: 471 MS
VENTRICULAR RATE, ECG03: 69 BPM

## 2022-09-28 NOTE — ED PROVIDER NOTES
81-year-old male with history of HTN, heart failure status post AICD presents to the ED with chief complaint of episode of dizziness this past evening. Patient was sitting down, felt dizzy, had sweating, found his blood pressure to be in the 80s over 30s. Did not experience ICD shock. Symptoms lasted for a minute and then resolved, currently feels his normal self. Did not have any chest pain, difficulty breathing, abdominal pain, urinary symptoms, bowel symptoms. EMS arrived, had normal orthostatic vital signs. Patient says he has had intermittent similar episodes for the past 2 months, contact his cardiologist and was told they would check his pacemaker and let him know if there are any abnormalities. He was not told they were any abnormalities. He has had episodes approximately 1-2 times per day though this was worse than normal.    The history is provided by the patient. Hypotension   Pertinent negatives include no confusion, no weakness and no numbness. Functional status baseline:  [EPIC#1537^NOTE}   Dizziness  Pertinent negatives include no shortness of breath, no chest pain, no vomiting, no confusion, no headaches and no nausea.       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    Essential hypertension     Gout 8/20/2010    Heart failure ([facility] [address]     Ischemic cardiomyopathy     Long term (current) use of antithrombotics/antiplatelets     Mixed hyperlipidemia 8/20/2010    S/P placement of cardiac pacemaker 1/24/2019       Past Surgical History:   Procedure Laterality Date    BIOPSY PROSTATE      HX CHOLECYSTECTOMY      HX CORONARY ARTERY BYPASS GRAFT  12/26/06    [place] CARDIAC SURG PROCEDURE UNLIST      cabg         Family History:   Problem Relation Age of Onset    Heart Disease Mother        Social History     Socioeconomic History    Marital status:      Spouse name: Not on file    Number of children: Not on file    Years of education: Not on file    Highest education level: Not on file   Occupational History    Not on file   Tobacco Use    Smoking status: Former     Years: 30.00     Types: Cigarettes     Quit date: 1997     Years since quittin.7    Smokeless tobacco: Never   Substance and Sexual Activity    Alcohol use: Yes     Comment: 1 per month    Drug use: No    Sexual activity: Yes     Partners: Female   Other Topics Concern    Not on file   Social History Narrative    Not on file     Social Determinants of Health     Financial Resource Strain: Not on file   Food Insecurity: Not on file   Transportation Needs: Not on file   Physical Activity: Not on file   Stress: Not on file   Social Connections: Not on file   Intimate Partner Violence: Not on file   Housing Stability: Not on file         ALLERGIES: Aggrastat [tirofiban]    Review of Systems   Constitutional:  Negative for chills and fever. HENT:  Negative for congestion and rhinorrhea. Respiratory:  Negative for cough, shortness of breath and wheezing. Cardiovascular:  Negative for chest pain and leg swelling. Gastrointestinal:  Negative for abdominal pain, constipation, diarrhea, nausea and vomiting. Genitourinary:  Negative for difficulty urinating, dysuria and hematuria. Musculoskeletal:  Negative for back pain and neck pain. Skin:  Negative for color change and rash. Neurological:  Positive for dizziness. Negative for weakness, numbness and headaches. Psychiatric/Behavioral:  Negative for behavioral problems and confusion. Vitals:    22 2131   BP: (!) 127/57   Pulse: 71   Resp: 16   Temp: 97.6 °F (36.4 °C)   SpO2: 97%            Physical Exam  Constitutional:       General: He is not in acute distress. Appearance: He is well-developed. HENT:      Head: Normocephalic and atraumatic.    Eyes:      Conjunctiva/sclera: Conjunctivae normal.      Pupils: Pupils are equal, round, and reactive to light. Neck:      Trachea: No tracheal deviation. Cardiovascular:      Rate and Rhythm: Normal rate and regular rhythm. Heart sounds: No murmur heard. No friction rub. No gallop. Pulmonary:      Effort: No respiratory distress. Breath sounds: Normal breath sounds. Abdominal:      General: Bowel sounds are normal. There is no distension. Palpations: Abdomen is soft. Tenderness: There is no abdominal tenderness. Musculoskeletal:         General: No deformity. Cervical back: Neck supple. Skin:     General: Skin is warm and dry. Neurological:      Mental Status: He is alert and oriented to person, place, and time. MDM  Number of Diagnoses or Management Options  Dizziness  Diagnosis management comments: 81-year-old male presenting with episode of dizziness, now resolved. Differential includes arrhythmia, vasovagal episode, orthostatic hypotension. Labs are benign, troponin negative, EKG with paced rhythm. Offered admission to the patient, he declined, says he will call his cardiologist first thing in the morning and would like to go home. Strict return precautions given. Will discharge. Amount and/or Complexity of Data Reviewed  Clinical lab tests: ordered and reviewed           Procedures    DISCHARGE NOTE:  1:06 AM  The patient has been re-evaluated and feeling much better and are stable for discharge. All available radiology and laboratory results have been reviewed with patient and/or available family. Patient and/or family verbally conveyed their understanding and agreement of the patient's signs, symptoms, diagnosis, treatment and prognosis and additionally agree to follow-up as recommended in the discharge instructions or to return to the Emergency Department should their condition change or worsen prior to their follow-up appointment. All questions have been answered and patient and/or available family express understanding. LABORATORY RESULTS:  Recent Results (from the past 24 hour(s))   EKG, 12 LEAD, INITIAL    Collection Time: 09/27/22  9:43 PM   Result Value Ref Range    Ventricular Rate 69 BPM    Atrial Rate 69 BPM    P-R Interval 260 ms    QRS Duration 128 ms    Q-T Interval 440 ms    QTC Calculation (Bezet) 471 ms    Calculated P Axis 79 degrees    Calculated R Axis 38 degrees    Calculated T Axis -122 degrees    Diagnosis       Atrial-paced rhythm with prolonged AV conduction  Nonspecific intraventricular block  Inferior infarct (cited on or before 19-MAR-2011)  Cannot rule out Anterior infarct (cited on or before 10-CHAI-2020)  Abnormal ECG  When compared with ECG of 13-AUG-2021 12:43,  premature ventricular complexes are no longer present  premature atrial complexes are no longer present     CBC WITH AUTOMATED DIFF    Collection Time: 09/27/22  9:53 PM   Result Value Ref Range    WBC 8.1 4.1 - 11.1 K/uL    RBC 4.14 4.10 - 5.70 M/uL    HGB 12.7 12.1 - 17.0 g/dL    HCT 39.3 36.6 - 50.3 %    MCV 94.9 80.0 - 99.0 FL    MCH 30.7 26.0 - 34.0 PG    MCHC 32.3 30.0 - 36.5 g/dL    RDW 14.7 (H) 11.5 - 14.5 %    PLATELET 550 322 - 792 K/uL    MPV 11.5 8.9 - 12.9 FL    NRBC 0.0 0  WBC    ABSOLUTE NRBC 0.00 0.00 - 0.01 K/uL    NEUTROPHILS 79 (H) 32 - 75 %    LYMPHOCYTES 9 (L) 12 - 49 %    MONOCYTES 8 5 - 13 %    EOSINOPHILS 3 0 - 7 %    BASOPHILS 1 0 - 1 %    IMMATURE GRANULOCYTES 0 0.0 - 0.5 %    ABS. NEUTROPHILS 6.5 1.8 - 8.0 K/UL    ABS. LYMPHOCYTES 0.7 (L) 0.8 - 3.5 K/UL    ABS. MONOCYTES 0.6 0.0 - 1.0 K/UL    ABS. EOSINOPHILS 0.2 0.0 - 0.4 K/UL    ABS. BASOPHILS 0.1 0.0 - 0.1 K/UL    ABS. IMM.  GRANS. 0.0 0.00 - 0.04 K/UL    DF SMEAR SCANNED      RBC COMMENTS MACROCYTOSIS  1+        RBC COMMENTS ANISOCYTOSIS  1+        RBC COMMENTS OVALOCYTES  PRESENT       METABOLIC PANEL, COMPREHENSIVE    Collection Time: 09/27/22  9:53 PM   Result Value Ref Range    Sodium 139 136 - 145 mmol/L    Potassium 4.0 3.5 - 5.1 mmol/L    Chloride 106 97 - 108 mmol/L    CO2 27 21 - 32 mmol/L    Anion gap 6 5 - 15 mmol/L    Glucose 145 (H) 65 - 100 mg/dL    BUN 24 (H) 6 - 20 MG/DL    Creatinine 1.91 (H) 0.70 - 1.30 MG/DL    BUN/Creatinine ratio 13 12 - 20      GFR est AA 41 (L) >60 ml/min/1.73m2    GFR est non-AA 34 (L) >60 ml/min/1.73m2    Calcium 9.0 8.5 - 10.1 MG/DL    Bilirubin, total 0.7 0.2 - 1.0 MG/DL    ALT (SGPT) 18 12 - 78 U/L    AST (SGOT) 18 15 - 37 U/L    Alk. phosphatase 114 45 - 117 U/L    Protein, total 7.0 6.4 - 8.2 g/dL    Albumin 3.6 3.5 - 5.0 g/dL    Globulin 3.4 2.0 - 4.0 g/dL    A-G Ratio 1.1 1.1 - 2.2     SAMPLES BEING HELD    Collection Time: 09/27/22  9:53 PM   Result Value Ref Range    SAMPLES BEING HELD 1RED,1BLU     COMMENT        Add-on orders for these samples will be processed based on acceptable specimen integrity and analyte stability, which may vary by analyte. TROPONIN-HIGH SENSITIVITY    Collection Time: 09/27/22  9:53 PM   Result Value Ref Range    Troponin-High Sensitivity 10 0 - 76 ng/L       IMAGING RESULTS:  No results found. MEDICATIONS GIVEN:  Medications - No data to display    IMPRESSION:  1.  Dizziness        PLAN:  Follow-up Information       Follow up With Specialties Details Why Contact Info    Your cardiologist  In 2 days      [address] DEP Emergency Medicine  As needed, If symptoms worsen [address] [place] [address]  [phone]          Current Discharge Medication List          Signed By: Damián Levi MD     September 28, 2022

## 2022-09-28 NOTE — ED TRIAGE NOTES
Pt presents to department via EMS from home with a CC of lightheaded/dizziness and diaphoretic while sitting. Pt states his friend said his eyes closed briefly that pt is unsure he remembers. Pt reports taking BP and was 89/30 while sitting. PMHx of bypass.

## 2022-09-29 DIAGNOSIS — I25.10 ATHEROSCLEROSIS OF NATIVE CORONARY ARTERY OF NATIVE HEART WITHOUT ANGINA PECTORIS: ICD-10-CM

## 2022-09-29 RX ORDER — CARVEDILOL 25 MG/1
TABLET ORAL
Qty: 180 TABLET | Refills: 2 | Status: SHIPPED | OUTPATIENT
Start: 2022-09-29

## 2022-09-30 ENCOUNTER — PATIENT OUTREACH (OUTPATIENT)
Dept: CASE MANAGEMENT | Age: 82
End: 2022-09-30

## 2022-09-30 NOTE — PROGRESS NOTES
Ambulatory Care Management Note    Date/Time:  9/30/2022 11:41 AM    This patient was received as a referral from  ER registry . Ambulatory Care Manager outreached to patient today to offer care management services. Introduction to self and role of care manager provided. Patient accepted care management services at this time. Follow up call scheduled at this time. Patient has Ambulatory Care Manager's contact number for any questions or concerns. CHF/CKD, stage 3a or 3b    Patient presented to the ER with c/o dizziness, diaphoresis and hypotension. Labs: BUN 24, Cr 1.91 - Pt has appt with Dr. Gil Mccartney (nephrology) next week. Patient called Dr. Rohit Smith and was advised to stop furosemide. Med list updated. Pt has appt with electrophysiologist in October (defibrillator)  Patient has been experiencing dizziness since July. Discussed hydration, hypoglycemia -states he ate 2 hours before event.

## 2022-10-24 ENCOUNTER — PATIENT OUTREACH (OUTPATIENT)
Dept: CASE MANAGEMENT | Age: 82
End: 2022-10-24

## 2022-10-24 NOTE — PROGRESS NOTES
Ambulatory Care Management Note    Date/Time:  10/26/2022 4:18 PM     This patient was received as a referral from  ER . Ambulatory Care Manager outreached to patient today to offer care management services. Introduction to self and role of care manager provided. Patient accepted care management services at this time. This Ambulatory Care Manager (ACM) reviewed and updated the following screenings during this call; general assessment, disease specific assessment , SDOH assessments, and ACP assessment and note    Patient's challenges to self-management identified:   lack of knowledge about disease    Medication Management:  good adherence    Advance Care Planning:   Does patient have an Advance Directive:  currently not on file; education provided     [employer], Referrals, and Durable Medical Equipment: none      Health Maintenance Due   Topic Date Due    DTaP/Tdap/Td series (1 - Tdap) Never done    Shingrix Vaccine Age 50> (1 of 2) Never done    COVID-19 Vaccine (2 - Booster for [employer] series) 05/27/2021    Flu Vaccine (1) 08/01/2022    Lipid Screen  09/14/2022     Health Maintenance Reviewed: yes    Top Challenges reviewed with the provider   none     Ambulatory  contacted patient for discussion and case management of CHF   Summary of patients top problems:   CHF- furosemide d/c by Dr. Guy Gone after ER visit for dizziness and hypotension. Pt was taking 1/4 of 80 mg tab of furosemide prior to this event. Denies any dizziness in the last three weeks. Today patient reports b/l LE edema L>R. Weight is up to 234 lbs per pt this is a two lb increase. Denies shortness of breath, orthopnea or CP. Positive of fatigue. Patient is scheduled to see Dr. Carrol Selby, [address] 11/3/22. PCP/Specialist follow up: No future appointments. Goals        ACP      10/26/22  Started discussion regarding ACP. Will mail documents to patient and patient agreeable for another discussion at that time.  ACM will follow up in 10-14 days. KG       Demonstrate self-management skills      10/26/22  Discussed with Dr. Mercedes Begum: patient can use furosemide 20 mg for LE edema and weight gain (per patient weight up 2 lbs and LE edema L>R. Discussed when to use furosemide. Pt will take lasix when weight increases 2-3 lbs overnight. Discussed kidney function and that he will need repeat BMP in two weeks. Instructed patient to weigh everyday the same way and keep a log. Assess LE for improvement, monitor for sob, julien and orthopnea. Patient does sleep on two pillows  normally. Denies any sob, julien orthopnea at this time. Will mail low Na diet information to patient. ACM will follow up in 7-10 days. KG    10/24/22  Patient states he weighs every day. Pt only provided today's weight of 234 lbs which, per patient, is a 2 lb increase. Denies shortness of breat, orthopnea or chest pain. Positive for fatigue. Bilateral LE edema present for a several days. States swelling does decrease overnight. Discussed low Na diet. Patient reports not adding any salt to food. Does eat out periodically and admits to having chinese food two days ago. Recommended reducing high sodium foods, increase water intake (4-8oz glasses of water), elevating legs. Patient stopped furosemide (80 mg - 1/4 tab) per instructions of Dr. Sunny Velasco. Will discuss with Dr. Mercedes Begum, now that patient is experiencing weight gain and B/L edema. ACM will contact patient after reviewing with Dr. Mercedes Begum. Patient verbalized understanding of all information discussed. Patient has this Ambulatory Care Manager's contact information for any further questions, concerns, or needs.

## 2022-10-24 NOTE — ACP (ADVANCE CARE PLANNING)
Advance Care Planning     General Advance Care Planning (ACP) Conversation      Date of Conversation: 10/24/2022  Conducted with: Patient    Healthcare Decision Maker:     Primary Decision Maker: Maegan Watson - [place] - [phone]  Click here to complete [employer] including selection of the Healthcare Decision Maker Relationship (ie \"Primary\")    Today we discussed Healthcare Decision Makers. The patient is considering options. Content/Action Overview:   Has NO ACP documents/care preferences - information provided, considering goals and options  Reviewed DNR/DNI and patient     Additional Comments: Mailed ACP documents for patient to review. ACM will be available for questions.       Length of Voluntary ACP Conversation in minutes:  <16 minutes (Non-Billable)    Horacio Zarate RN

## 2022-10-26 RX ORDER — FUROSEMIDE 20 MG/1
20 TABLET ORAL DAILY
Qty: 90 TABLET | Refills: 0 | Status: SHIPPED | OUTPATIENT
Start: 2022-10-26

## 2022-10-28 ENCOUNTER — PATIENT OUTREACH (OUTPATIENT)
Dept: CASE MANAGEMENT | Age: 82
End: 2022-10-28

## 2022-10-28 NOTE — PROGRESS NOTES
Goals        ACP      10/28/22  Mailed ACP form to patient. 10/26/22  Started discussion regarding ACP. Will mail documents to patient and patient agreeable for another discussion at that time. ACM will follow up in 10-14 days. KG       Demonstrate self-management skills      10/28/22  Mailed information on reading labels and keeping sodium <2300 mg daily and a list of foods that are low in sodium and ones to avoid. KG    10/26/22  Discussed with Dr. Sienna Rojas: patient can use furosemide 20 mg for LE edema and weight gain (per patient weight up 2 lbs and LE edema L>R. Discussed when to use furosemide. Pt will take lasix when weight increases 2-3 lbs overnight. Discussed kidney function and that he will need repeat BMP in two weeks. Instructed patient to weigh everyday the same way and keep a log. Assess LE for improvement, monitor for sob, julien and orthopnea. Patient does sleep on two pillows  normally. Denies any sob, julien orthopnea at this time. Will mail low Na diet information to patient. ACM will follow up in 7-10 days. KG    10/24/22  Patient states he weighs every day. Pt only provided today's weight of 234 lbs which, per patient, is a 2 lb increase. Denies shortness of breat, orthopnea or chest pain. Positive for fatigue. Bilateral LE edema present for a several days. States swelling does decrease overnight. Discussed low Na diet. Patient reports not adding any salt to food. Does eat out periodically and admits to having chinese food two days ago. Recommended reducing high sodium foods, increase water intake (4-8oz glasses of water), elevating legs. Patient stopped furosemide (80 mg - 1/4 tab) per instructions of Dr. Heather Escalante. Will discuss with Dr. Sienna Rojas, now that patient is experiencing weight gain and B/L edema. ACM will contact patient after reviewing with Dr. Sienna Rojas.

## 2022-11-11 ENCOUNTER — PATIENT OUTREACH (OUTPATIENT)
Dept: CASE MANAGEMENT | Age: 82
End: 2022-11-11

## 2022-11-11 DIAGNOSIS — Z79.899 MEDICATION MANAGEMENT: Primary | ICD-10-CM

## 2022-11-11 NOTE — PROGRESS NOTES
Heart Failure Education outreach Date/Time: 2022 8:52 AM    Ambulatory Care Manager (ACM) contacted the patient by telephone to perform Ambulatory Care Coordination. Verified name and  with patient as identifiers. Provided introduction to self, and explanation of the Ambulatory Care Manager's role. ACM reviewed that a Health Healthy tips packet for the Holiday has been sent to [facility] [employer]. ACM reviewed CHF zones, the importance of low sodium diet, healthy tips packet with the patient. Instructed patient to call their Cardiologist if they have a weight gain of 3 lbs in 2 days or 5 lbs in a week. Patient reminded that there is a physician on call 24 hours a day / 7 days a week should the patient have questions or concerns. The patient verbalized understanding. Goals Addressed                   This Visit's Progress     Demonstrate self-management skills        22  Follow up call to patient who reports he has been taking 20 mg lasix every other day with some improvement. L>R. Weight has been stable at 233 lbs. Denies sob, julien or orthopnea at this time. Patient reports seeing electrophysiologist, Dr. Binh Alcala 11/3/22. He showed MD COLBERT. Inquired if he advised Dr. Binh Alcala that he was taking lasix 20 mg every other day. They did not discuss but patient states it was in his record. Patient states MD changed the dose of one medication and wanted to add another. He was not sure what the names were. One medication was quite expensive and patient will not be able to afford. Discussed possible patient assistance. Encouraged patient to call Dr. Binh Alcala and clarify dosing on medication and advise the second med is cost prohibitive and inquire about patient assistance. Called cards to request progress note, not available as of today. Patient will provide ACM with an update. Scheduled patient for labs in the office for 22 @10:45 patient aware.   Patient will continue current plan, daily weights and call cardiology should edema worsen and/or he experiencines sob, julien, orthopnea or cp. ACM will follow up in 7-10 days. KG    10/28/22  Mailed information on reading labels and keeping sodium <2300 mg daily and a list of foods that are low in sodium and ones to avoid. KG    10/26/22  Discussed with Dr. Coni Walker: patient can use furosemide 20 mg for LE edema and weight gain (per patient weight up 2 lbs and LE edema L>R. Discussed when to use furosemide. Pt will take lasix when weight increases 2-3 lbs overnight. Discussed kidney function and that he will need repeat BMP in two weeks. Instructed patient to weigh everyday the same way and keep a log. Assess LE for improvement, monitor for sob, julien and orthopnea. Patient does sleep on two pillows  normally. Denies any sob, julien orthopnea at this time. Will mail low Na diet information to patient. ACM will follow up in 7-10 days. KG    10/24/22  Patient states he weighs every day. Pt only provided today's weight of 234 lbs which, per patient, is a 2 lb increase. Denies shortness of breat, orthopnea or chest pain. Positive for fatigue. Bilateral LE edema present for a several days. States swelling does decrease overnight. Discussed low Na diet. Patient reports not adding any salt to food. Does eat out periodically and admits to having chinese food two days ago. Recommended reducing high sodium foods, increase water intake (4-8oz glasses of water), elevating legs. Patient stopped furosemide (80 mg - 1/4 tab) per instructions of Dr. Elana Bryan. Will discuss with Dr. Coni Walker, now that patient is experiencing weight gain and B/L edema. ACM will contact patient after reviewing with Dr. Coni Walker.

## 2022-11-14 ENCOUNTER — APPOINTMENT (OUTPATIENT)
Dept: INTERNAL MEDICINE CLINIC | Age: 82
End: 2022-11-14

## 2022-11-14 DIAGNOSIS — Z79.899 MEDICATION MANAGEMENT: ICD-10-CM

## 2022-11-15 LAB
ANION GAP SERPL CALC-SCNC: 4 MMOL/L (ref 5–15)
BUN SERPL-MCNC: 25 MG/DL (ref 6–20)
BUN/CREAT SERPL: 16 (ref 12–20)
CALCIUM SERPL-MCNC: 9 MG/DL (ref 8.5–10.1)
CHLORIDE SERPL-SCNC: 108 MMOL/L (ref 97–108)
CO2 SERPL-SCNC: 28 MMOL/L (ref 21–32)
CREAT SERPL-MCNC: 1.6 MG/DL (ref 0.7–1.3)
GLUCOSE SERPL-MCNC: 138 MG/DL (ref 65–100)
POTASSIUM SERPL-SCNC: 4.6 MMOL/L (ref 3.5–5.1)
SODIUM SERPL-SCNC: 140 MMOL/L (ref 136–145)

## 2022-12-14 ENCOUNTER — PATIENT OUTREACH (OUTPATIENT)
Dept: CASE MANAGEMENT | Age: 82
End: 2022-12-14

## 2022-12-14 NOTE — PROGRESS NOTES
Heart Failure Education outreach Date/Time: 2022 11:30 AM    Ambulatory Care Manager (ACM) contacted the patient by telephone to perform Ambulatory Care Coordination. Verified name and  with patient as identifiers. Provided introduction to self, and explanation of the Ambulatory Care Manager's role. ACM reviewed that a Health Healthy tips packet for the Holiday has been sent to [employer]. ACM reviewed CHF zones, daily weights, the importance of low sodium diet with the patient. Instructed patient to call their Cardiologist if they have a weight gain of 3 lbs in 2 days or 5 lbs in a week. Patient reminded that there is a physician on call 24 hours a day / 7 days a week should the patient have questions or concerns. The patient verbalized understanding. Goals        ACP      10/28/22  Mailed ACP form to patient. 10/26/22  Started discussion regarding ACP. Will mail documents to patient and patient agreeable for another discussion at that time. ACM will follow up in 10-14 days. KG       demonstrate self management skills - chf      22  Labs drawn and Cr has improved to 1.60 down from 1.91. SJR recommends repeat labs in 3 months. Patient continues to take lasix 20 mg every other day. Patient reports this dose is managing the LE edema. Denies shortness of breath, PRICE or belly fullness. Reviewed importance of diet during holidays and should he experience >3 lbs over night or 5 lbs over the course of a week, pt should contact . Patient attended appt with Dr. Ladan Becerra 22. No med changes per patient. Progress note requested. OV note lists Jardiance 10 mg daily. Patient reports he did not fill the rx due to cost. [address] patient assistance. Patient does not believe he would qualify based on his income. Advised of holiday HF information sent through [address] ACM will follow up in 14 days.  KG      22  Follow up call to patient who reports he has been taking 20 mg lasix every other day with some improvement. L>R. Weight has been stable at 233 lbs. Denies sob, julien or orthopnea at this time. Patient reports seeing electrophysiologist, Dr. Sergo Mcgee 11/3/22. He showed MD COLBERT. Inquired if he advised Dr. Sergo Mcgee that he was taking lasix 20 mg every other day. They did not discuss but patient states it was in his record. Patient states MD changed the dose of one medication and wanted to add another. He was not sure what the names were. One medication was quite expensive and patient will not be able to afford. Discussed possible patient assistance. Encouraged patient to call Dr. Sergo Mcgee and clarify dosing on medication and advise the second med is cost prohibitive and inquire about patient assistance. Called cards to request progress note, not available as of today. Patient will provide ACM with an update. Scheduled patient for labs in the office for 11/14/22 @10:45 patient aware. Patient will continue current plan, daily weights and call cardiology should edema worsen and/or he experiencines sob, julien, orthopnea or cp. ACM will follow up in 7-10 days. KG    10/28/22  Mailed information on reading labels and keeping sodium <2300 mg daily and a list of foods that are low in sodium and ones to avoid. KG    10/26/22  Discussed with Dr. Flavia Montes: patient can use furosemide 20 mg for LE edema and weight gain (per patient weight up 2 lbs and LE edema L>R. Discussed when to use furosemide. Pt will take lasix when weight increases 2-3 lbs overnight. Discussed kidney function and that he will need repeat BMP in two weeks. Instructed patient to weigh everyday the same way and keep a log. Assess LE for improvement, monitor for sob, julien and orthopnea. Patient does sleep on two pillows  normally. Denies any sob, julien orthopnea at this time. Will mail low Na diet information to patient. ACM will follow up in 7-10 days. KG    10/24/22  Patient states he weighs every day.  Pt only provided today's weight of 234 lbs which, per patient, is a 2 lb increase. Denies shortness of breat, orthopnea or chest pain. Positive for fatigue. Bilateral LE edema present for a several days. States swelling does decrease overnight. Discussed low Na diet. Patient reports not adding any salt to food. Does eat out periodically and admits to having chinese food two days ago. Recommended reducing high sodium foods, increase water intake (4-8oz glasses of water), elevating legs. Patient stopped furosemide (80 mg - 1/4 tab) per instructions of Dr. Claire Verdugo. Will discuss with Dr. Janett Resendiz, now that patient is experiencing weight gain and B/L edema. ACM will contact patient after reviewing with Dr. Janett Resendiz.

## 2023-01-09 ENCOUNTER — PATIENT OUTREACH (OUTPATIENT)
Dept: CASE MANAGEMENT | Age: 83
End: 2023-01-09

## 2023-01-19 DIAGNOSIS — M10.20 DRUG-INDUCED GOUT, UNSPECIFIED CHRONICITY, UNSPECIFIED SITE: ICD-10-CM

## 2023-01-19 RX ORDER — ALLOPURINOL 300 MG/1
TABLET ORAL
Qty: 90 TABLET | Refills: 2 | Status: SHIPPED | OUTPATIENT
Start: 2023-01-19

## 2023-01-21 RX ORDER — FUROSEMIDE 20 MG/1
TABLET ORAL
Qty: 90 TABLET | Refills: 0 | Status: SHIPPED | OUTPATIENT
Start: 2023-01-21

## 2023-02-06 ENCOUNTER — PATIENT OUTREACH (OUTPATIENT)
Dept: CASE MANAGEMENT | Age: 83
End: 2023-02-06

## 2023-02-06 DIAGNOSIS — N28.9 ABNORMAL KIDNEY FUNCTION: Primary | ICD-10-CM

## 2023-02-06 NOTE — PROGRESS NOTES
Patient has graduated from the Complex Case Management  program on 2/6/23. Patient/family has the ability to self-manage at this time. Care management goals have been completed. No further Ambulatory Care Manager follow up scheduled. Goals Addressed                   This Visit's Progress     COMPLETED: ACP        10/28/22  Mailed ACP form to patient. 10/26/22  Started discussion regarding ACP. Will mail documents to patient and patient agreeable for another discussion at that time. ACM will follow up in 10-14 days. KG       demonstrate self management skills - chf        02/06/23  Patient reports weight has remained at 229. Denies experiencing >3 lbs overnight or 5 lbs in a week. LE L>R remains unchanged. No shortness of breath, orthopnea, julien is unchanged. Patient reports resting when feeling julien and is able to return to activity. Continues to take lasix every other day. Explained need for lab to check on kidney function. Patient states he is scheduled to see Dr. Ester Vallecillo (nephrology) 2/8/23. Last lab in November showed an improvement in Cr. Asked patient to have progress note faxed to PCP. Patient will follow up with Dr. Dakota Cho in July. No other needs identified at this time. Patient is managing independently quite well. Pt has contact information if needed. KG    12/14/22  Labs drawn and Cr has improved to 1.60 down from 1.91. SJR recommends repeat labs in 3 months. Patient continues to take lasix 20 mg every other day. Patient reports this dose is managing the LE edema. Denies shortness of breath, JULIEN or belly fullness. Reviewed importance of diet during holidays and should he experience >3 lbs over night or 5 lbs over the course of a week, pt should contact . Patient attended appt with Dr. Jeferson Degroot 12/8/22. No med changes per patient. Progress note requested. OV note lists Jardiance 10 mg daily.  Patient reports he did not fill the rx due to cost. Discussed [address] patient assistance. Patient does not believe he would qualify based on his income. Advised of holiday HF information sent through [employer]. ACM will follow up in 14 days. KG      11/11/22  Follow up call to patient who reports he has been taking 20 mg lasix every other day with some improvement. L>R. Weight has been stable at 233 lbs. Denies sob, julien or orthopnea at this time. Patient reports seeing electrophysiologist, Dr. Pablo Scales 11/3/22. He showed MD COLBERT. Inquired if he advised Dr. Pablo Scales that he was taking lasix 20 mg every other day. They did not discuss but patient states it was in his record. Patient states MD changed the dose of one medication and wanted to add another. He was not sure what the names were. One medication was quite expensive and patient will not be able to afford. Discussed possible patient assistance. Encouraged patient to call Dr. Pablo Scales and clarify dosing on medication and advise the second med is cost prohibitive and inquire about patient assistance. Called cards to request progress note, not available as of today. Patient will provide ACM with an update. Scheduled patient for labs in the office for 11/14/22 @10:45 patient aware. Patient will continue current plan, daily weights and call cardiology should edema worsen and/or he experiencines sob, julien, orthopnea or cp. ACM will follow up in 7-10 days. KG    10/28/22  Mailed information on reading labels and keeping sodium <2300 mg daily and a list of foods that are low in sodium and ones to avoid. KG    10/26/22  Discussed with Dr. Tony Campos: patient can use furosemide 20 mg for LE edema and weight gain (per patient weight up 2 lbs and LE edema L>R. Discussed when to use furosemide. Pt will take lasix when weight increases 2-3 lbs overnight. Discussed kidney function and that he will need repeat BMP in two weeks. Instructed patient to weigh everyday the same way and keep a log.  Assess LE for improvement, monitor for sob, julien and orthopnea. Patient does sleep on two pillows  normally. Denies any sob, julien orthopnea at this time. Will mail low Na diet information to patient. ACM will follow up in 7-10 days. KG    10/24/22  Patient states he weighs every day. Pt only provided today's weight of 234 lbs which, per patient, is a 2 lb increase. Denies shortness of breat, orthopnea or chest pain. Positive for fatigue. Bilateral LE edema present for a several days. States swelling does decrease overnight. Discussed low Na diet. Patient reports not adding any salt to food. Does eat out periodically and admits to having chinese food two days ago. Recommended reducing high sodium foods, increase water intake (4-8oz glasses of water), elevating legs. Patient stopped furosemide (80 mg - 1/4 tab) per instructions of Dr. Shirin Lowry. Will discuss with Dr. Yuridia Wong, now that patient is experiencing weight gain and B/L edema. ACM will contact patient after reviewing with Dr. Yuridia Wong. Patient has Ambulatory Care Manager's contact information for any further questions, concerns, or needs. Patients upcoming visits:  No future appointments.

## 2023-02-09 LAB — CREATININE, EXTERNAL: 1.42

## 2023-03-15 NOTE — PROGRESS NOTES
I have reviewed discharge instructions with the patient. The patient verbalized understanding. Copy of discharge instructions given to patient. therapy

## 2023-04-18 NOTE — ED NOTES
Discharge instructions given to patient by provider and nurse. Patient has verbalized understanding on medication use and discharge instuctions. IV discontinued. Pt ambulated off of unit, denied wheelchair, and is showing no apparent signs of distress.
Eldelry female pw c/o near syncope yesterday. PETERS in ED neg inculding TTE, CT head, labs/trop/ua, EKG will perform second trop if neg  dc home for fu cards  Jacques Sousa MD, Facep

## 2023-04-23 RX ORDER — FUROSEMIDE 20 MG/1
TABLET ORAL
Qty: 90 TABLET | Refills: 0 | Status: SHIPPED | OUTPATIENT
Start: 2023-04-23

## 2023-05-01 DIAGNOSIS — I25.10 ATHEROSCLEROSIS OF NATIVE CORONARY ARTERY OF NATIVE HEART WITHOUT ANGINA PECTORIS: ICD-10-CM

## 2023-05-01 RX ORDER — CLOPIDOGREL BISULFATE 75 MG/1
TABLET ORAL
Qty: 90 TABLET | Refills: 2 | Status: SHIPPED | OUTPATIENT
Start: 2023-05-01

## 2023-05-17 ENCOUNTER — OFFICE VISIT (OUTPATIENT)
Age: 83
End: 2023-05-17
Payer: MEDICARE

## 2023-05-17 VITALS
HEART RATE: 83 BPM | WEIGHT: 231 LBS | BODY MASS INDEX: 31.29 KG/M2 | DIASTOLIC BLOOD PRESSURE: 71 MMHG | HEIGHT: 72 IN | TEMPERATURE: 97.6 F | RESPIRATION RATE: 12 BRPM | SYSTOLIC BLOOD PRESSURE: 144 MMHG | OXYGEN SATURATION: 97 %

## 2023-05-17 DIAGNOSIS — R35.1 BENIGN PROSTATIC HYPERPLASIA WITH NOCTURIA: Primary | ICD-10-CM

## 2023-05-17 DIAGNOSIS — I50.22 CHRONIC SYSTOLIC (CONGESTIVE) HEART FAILURE (HCC): ICD-10-CM

## 2023-05-17 DIAGNOSIS — J45.21 MILD INTERMITTENT ASTHMATIC BRONCHITIS WITH ACUTE EXACERBATION: ICD-10-CM

## 2023-05-17 DIAGNOSIS — N18.30 STAGE 3 CHRONIC KIDNEY DISEASE, UNSPECIFIED WHETHER STAGE 3A OR 3B CKD (HCC): ICD-10-CM

## 2023-05-17 DIAGNOSIS — N40.1 BENIGN PROSTATIC HYPERPLASIA WITH NOCTURIA: Primary | ICD-10-CM

## 2023-05-17 PROCEDURE — 1123F ACP DISCUSS/DSCN MKR DOCD: CPT | Performed by: INTERNAL MEDICINE

## 2023-05-17 PROCEDURE — 3078F DIAST BP <80 MM HG: CPT | Performed by: INTERNAL MEDICINE

## 2023-05-17 PROCEDURE — 99214 OFFICE O/P EST MOD 30 MIN: CPT | Performed by: INTERNAL MEDICINE

## 2023-05-17 PROCEDURE — G8427 DOCREV CUR MEDS BY ELIG CLIN: HCPCS | Performed by: INTERNAL MEDICINE

## 2023-05-17 PROCEDURE — 1036F TOBACCO NON-USER: CPT | Performed by: INTERNAL MEDICINE

## 2023-05-17 PROCEDURE — 3077F SYST BP >= 140 MM HG: CPT | Performed by: INTERNAL MEDICINE

## 2023-05-17 PROCEDURE — G8417 CALC BMI ABV UP PARAM F/U: HCPCS | Performed by: INTERNAL MEDICINE

## 2023-05-17 RX ORDER — LOSARTAN POTASSIUM 50 MG/1
25 TABLET ORAL
Qty: 30 TABLET | COMMUNITY
Start: 2023-05-17

## 2023-05-17 RX ORDER — DEXTROMETHORPHAN POLISTIREX 30 MG/5ML
60 SUSPENSION ORAL 2 TIMES DAILY PRN
COMMUNITY
Start: 2023-05-17 | End: 2023-05-27

## 2023-05-17 RX ORDER — TAMSULOSIN HYDROCHLORIDE 0.4 MG/1
0.4 CAPSULE ORAL DAILY
Qty: 30 CAPSULE | Refills: 0 | Status: SHIPPED | OUTPATIENT
Start: 2023-05-17

## 2023-05-17 RX ORDER — DOXYCYCLINE HYCLATE 100 MG
100 TABLET ORAL 2 TIMES DAILY
Qty: 14 TABLET | Refills: 0 | Status: SHIPPED | OUTPATIENT
Start: 2023-05-17 | End: 2023-05-24

## 2023-05-17 RX ORDER — GUAIFENESIN 600 MG/1
1200 TABLET, EXTENDED RELEASE ORAL 2 TIMES DAILY
Qty: 30 TABLET | Refills: 0 | COMMUNITY
Start: 2023-05-17 | End: 2023-06-01

## 2023-05-17 ASSESSMENT — PATIENT HEALTH QUESTIONNAIRE - PHQ9
1. LITTLE INTEREST OR PLEASURE IN DOING THINGS: 0
SUM OF ALL RESPONSES TO PHQ QUESTIONS 1-9: 0
SUM OF ALL RESPONSES TO PHQ9 QUESTIONS 1 & 2: 0
SUM OF ALL RESPONSES TO PHQ QUESTIONS 1-9: 0
2. FEELING DOWN, DEPRESSED OR HOPELESS: 0

## 2023-05-17 NOTE — PROGRESS NOTES
and all orders for this visit:    Benign prostatic hyperplasia with nocturia-we will try Flomax and see if it is helpful. Advised him it may cause some lightheadedness. Chronic systolic (congestive) heart failure (HCC)-stable on current meds and followed by cardiology. Stage 3 chronic kidney disease, unspecified whether stage 3a or 3b CKD (HCC)-recent blood work showed creatinine to be stable. Followed by cardiology. Mild intermittent asthmatic bronchitis with acute exacerbation-? Bacterial process. We will add doxycycline and Mucinex. He will let me know if not improving over the next week. Other orders  -     tamsulosin (FLOMAX) 0.4 MG capsule; Take 1 capsule by mouth daily  -     doxycycline hyclate (VIBRA-TABS) 100 MG tablet; Take 1 tablet by mouth 2 times daily for 7 days  -     guaiFENesin (MUCINEX) 600 MG extended release tablet; Take 2 tablets by mouth 2 times daily for 15 days  -     dextromethorphan (DELSYM) 30 MG/5ML extended release liquid; Take 10 mLs by mouth 2 times daily as needed for Cough       No follow-up provider specified. Advised him to call back or return to office if symptoms worsen/change/persist.  Discussed expected course/resolution/complications of diagnosis in detail with patient. Medication risks/benefits/costs/interactions/alternatives discussed with patient. He was given an after visit summary which includes diagnoses, current medications, & vitals. He expressed understanding with the diagnosis and plan.

## 2023-06-06 RX ORDER — SIMVASTATIN 40 MG
TABLET ORAL
Qty: 90 TABLET | Refills: 2 | Status: SHIPPED | OUTPATIENT
Start: 2023-06-06

## 2023-06-22 RX ORDER — CARVEDILOL 25 MG/1
TABLET ORAL
Qty: 180 TABLET | Refills: 0 | Status: SHIPPED | OUTPATIENT
Start: 2023-06-22

## 2023-06-29 ENCOUNTER — TELEPHONE (OUTPATIENT)
Age: 83
End: 2023-06-29

## 2023-07-11 RX ORDER — TAMSULOSIN HYDROCHLORIDE 0.4 MG/1
0.4 CAPSULE ORAL DAILY
Qty: 30 CAPSULE | Refills: 0 | Status: SHIPPED | OUTPATIENT
Start: 2023-07-11

## 2023-07-12 ENCOUNTER — OFFICE VISIT (OUTPATIENT)
Age: 83
End: 2023-07-12
Payer: MEDICARE

## 2023-07-12 VITALS
TEMPERATURE: 97.8 F | DIASTOLIC BLOOD PRESSURE: 75 MMHG | HEIGHT: 72 IN | OXYGEN SATURATION: 96 % | WEIGHT: 232.4 LBS | HEART RATE: 77 BPM | RESPIRATION RATE: 15 BRPM | BODY MASS INDEX: 31.48 KG/M2 | SYSTOLIC BLOOD PRESSURE: 138 MMHG

## 2023-07-12 DIAGNOSIS — I50.22 CHRONIC SYSTOLIC (CONGESTIVE) HEART FAILURE (HCC): Primary | ICD-10-CM

## 2023-07-12 DIAGNOSIS — E78.2 MIXED HYPERLIPIDEMIA: ICD-10-CM

## 2023-07-12 DIAGNOSIS — R35.1 BENIGN PROSTATIC HYPERPLASIA WITH NOCTURIA: ICD-10-CM

## 2023-07-12 DIAGNOSIS — N40.1 BENIGN PROSTATIC HYPERPLASIA WITH NOCTURIA: ICD-10-CM

## 2023-07-12 DIAGNOSIS — N18.30 STAGE 3 CHRONIC KIDNEY DISEASE, UNSPECIFIED WHETHER STAGE 3A OR 3B CKD (HCC): ICD-10-CM

## 2023-07-12 LAB
ALBUMIN SERPL-MCNC: 3.7 G/DL (ref 3.5–5)
ALBUMIN/GLOB SERPL: 1.4 (ref 1.1–2.2)
ALP SERPL-CCNC: 102 U/L (ref 45–117)
ALT SERPL-CCNC: 16 U/L (ref 12–78)
ANION GAP SERPL CALC-SCNC: 6 MMOL/L (ref 5–15)
AST SERPL-CCNC: 14 U/L (ref 15–37)
BASOPHILS # BLD: 0 K/UL (ref 0–0.1)
BASOPHILS NFR BLD: 1 % (ref 0–1)
BILIRUB SERPL-MCNC: 0.5 MG/DL (ref 0.2–1)
BUN SERPL-MCNC: 29 MG/DL (ref 6–20)
BUN/CREAT SERPL: 17 (ref 12–20)
CALCIUM SERPL-MCNC: 9.6 MG/DL (ref 8.5–10.1)
CHLORIDE SERPL-SCNC: 107 MMOL/L (ref 97–108)
CHOLEST SERPL-MCNC: 108 MG/DL
CO2 SERPL-SCNC: 27 MMOL/L (ref 21–32)
CREAT SERPL-MCNC: 1.67 MG/DL (ref 0.7–1.3)
DIFFERENTIAL METHOD BLD: ABNORMAL
EOSINOPHIL # BLD: 0.2 K/UL (ref 0–0.4)
EOSINOPHIL NFR BLD: 3 % (ref 0–7)
ERYTHROCYTE [DISTWIDTH] IN BLOOD BY AUTOMATED COUNT: 15.5 % (ref 11.5–14.5)
GLOBULIN SER CALC-MCNC: 2.6 G/DL (ref 2–4)
GLUCOSE SERPL-MCNC: 107 MG/DL (ref 65–100)
HCT VFR BLD AUTO: 39.9 % (ref 36.6–50.3)
HDLC SERPL-MCNC: 29 MG/DL
HDLC SERPL: 3.7 (ref 0–5)
HGB BLD-MCNC: 12.4 G/DL (ref 12.1–17)
IMM GRANULOCYTES # BLD AUTO: 0 K/UL (ref 0–0.04)
IMM GRANULOCYTES NFR BLD AUTO: 0 % (ref 0–0.5)
LDLC SERPL CALC-MCNC: 53.2 MG/DL (ref 0–100)
LYMPHOCYTES # BLD: 0.9 K/UL (ref 0.8–3.5)
LYMPHOCYTES NFR BLD: 12 % (ref 12–49)
MCH RBC QN AUTO: 29.2 PG (ref 26–34)
MCHC RBC AUTO-ENTMCNC: 31.1 G/DL (ref 30–36.5)
MCV RBC AUTO: 94.1 FL (ref 80–99)
MONOCYTES # BLD: 0.7 K/UL (ref 0–1)
MONOCYTES NFR BLD: 9 % (ref 5–13)
NEUTS SEG # BLD: 5.5 K/UL (ref 1.8–8)
NEUTS SEG NFR BLD: 75 % (ref 32–75)
NRBC # BLD: 0 K/UL (ref 0–0.01)
NRBC BLD-RTO: 0 PER 100 WBC
PLATELET # BLD AUTO: 134 K/UL (ref 150–400)
PMV BLD AUTO: 12.5 FL (ref 8.9–12.9)
POTASSIUM SERPL-SCNC: 4.1 MMOL/L (ref 3.5–5.1)
PROT SERPL-MCNC: 6.3 G/DL (ref 6.4–8.2)
RBC # BLD AUTO: 4.24 M/UL (ref 4.1–5.7)
SODIUM SERPL-SCNC: 140 MMOL/L (ref 136–145)
TRIGL SERPL-MCNC: 129 MG/DL
TSH SERPL DL<=0.05 MIU/L-ACNC: 0.58 UIU/ML (ref 0.36–3.74)
VLDLC SERPL CALC-MCNC: 25.8 MG/DL
WBC # BLD AUTO: 7.4 K/UL (ref 4.1–11.1)

## 2023-07-12 PROCEDURE — 1036F TOBACCO NON-USER: CPT | Performed by: INTERNAL MEDICINE

## 2023-07-12 PROCEDURE — 3075F SYST BP GE 130 - 139MM HG: CPT | Performed by: INTERNAL MEDICINE

## 2023-07-12 PROCEDURE — 99214 OFFICE O/P EST MOD 30 MIN: CPT | Performed by: INTERNAL MEDICINE

## 2023-07-12 PROCEDURE — G8417 CALC BMI ABV UP PARAM F/U: HCPCS | Performed by: INTERNAL MEDICINE

## 2023-07-12 PROCEDURE — 3078F DIAST BP <80 MM HG: CPT | Performed by: INTERNAL MEDICINE

## 2023-07-12 PROCEDURE — G8427 DOCREV CUR MEDS BY ELIG CLIN: HCPCS | Performed by: INTERNAL MEDICINE

## 2023-07-12 PROCEDURE — 1123F ACP DISCUSS/DSCN MKR DOCD: CPT | Performed by: INTERNAL MEDICINE

## 2023-07-12 NOTE — PROGRESS NOTES
HPI:  Olaf Steve is a 81y.o. year old male who is here for a follow-up visit. He has noted some slight increased edema in both ankles. He does note it is slightly improved. With elevation. Denies any headaches or dizziness. Some mild dyspnea. Occasionally feels lightheaded on standing. His blood pressures have been under good control. He is up-to-date on visits with cardiology. 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  Authorizing Provider   tamsulosin (FLOMAX) 0.4 MG capsule TAKE 1 CAPSULE BY MOUTH DAILY 7/11/23  Yes Ana Wakefield MD   carvedilol (COREG) 25 MG tablet TAKE ONE TABLET BY MOUTH TWICE A DAY WITH MEALS 6/22/23  Yes PHIL Orourke - NP   simvastatin (ZOCOR) 40 MG tablet TAKE ONE TABLET BY MOUTH ONCE NIGHTLY 6/6/23  Yes Ana Wakefield MD   GLUCOSAMINE-CHONDROITIN PO Take by mouth   Yes Historical Provider, MD   losartan (COZAAR) 50 MG tablet Take 0.5 tablets by mouth every 48 hours 5/17/23  Yes Ana Wakefield MD   Multiple Vitamin (MULTIVITAMIN PO) Take by mouth   Yes Ar Automatic Reconciliation   allopurinol (ZYLOPRIM) 300 MG tablet TAKE ONE TABLET BY MOUTH DAILY 1/19/23  Yes Ar Automatic Reconciliation   aspirin 81 MG EC tablet Take 1 tablet by mouth daily 5/1/14  Yes Ar Automatic Reconciliation   Cholecalciferol 50 MCG (2000 UT)

## 2023-08-08 RX ORDER — TAMSULOSIN HYDROCHLORIDE 0.4 MG/1
0.4 CAPSULE ORAL DAILY
Qty: 30 CAPSULE | Refills: 0 | Status: SHIPPED | OUTPATIENT
Start: 2023-08-08

## 2023-09-05 RX ORDER — TAMSULOSIN HYDROCHLORIDE 0.4 MG/1
0.4 CAPSULE ORAL DAILY
Qty: 30 CAPSULE | Refills: 0 | Status: SHIPPED | OUTPATIENT
Start: 2023-09-05

## 2023-09-25 RX ORDER — FUROSEMIDE 20 MG/1
TABLET ORAL
Qty: 90 TABLET | Refills: 1 | Status: SHIPPED | OUTPATIENT
Start: 2023-09-25

## 2023-10-15 RX ORDER — ALLOPURINOL 300 MG/1
TABLET ORAL
Qty: 90 TABLET | Refills: 2 | Status: SHIPPED | OUTPATIENT
Start: 2023-10-15

## 2023-10-17 ENCOUNTER — TELEPHONE (OUTPATIENT)
Age: 83
End: 2023-10-17

## 2023-10-17 RX ORDER — CARVEDILOL 25 MG/1
25 TABLET ORAL 2 TIMES DAILY WITH MEALS
Qty: 180 TABLET | Refills: 0 | Status: SHIPPED | OUTPATIENT
Start: 2023-10-17

## 2023-10-17 NOTE — TELEPHONE ENCOUNTER
Medication Refill Request    Martin Chavarria is requesting a refill of the following medication(s):   Carvedilol tablet 25mg Qty:180  Please send refill to:     [facility] [id number] [facility] [address] [phone]  [place] [address]  Phone: [phone] Fax: [phone]

## 2023-12-04 LAB
LEFT VENTRICULAR EJECTION FRACTION HIGH VALUE: 33 %
LV EF: 27 %

## 2024-01-09 RX ORDER — CARVEDILOL 25 MG/1
TABLET ORAL
Qty: 180 TABLET | Refills: 0 | Status: SHIPPED | OUTPATIENT
Start: 2024-01-09

## 2024-01-09 NOTE — TELEPHONE ENCOUNTER
Chief Complaint   Patient presents with    Medication Refill     Last Appointment with Dr. Asael Nagel:  7/12/2023   No future appointments.

## 2024-01-24 RX ORDER — CLOPIDOGREL BISULFATE 75 MG/1
TABLET ORAL
Qty: 90 TABLET | Refills: 1 | Status: SHIPPED | OUTPATIENT
Start: 2024-01-24

## 2024-02-29 RX ORDER — SIMVASTATIN 40 MG
TABLET ORAL
Qty: 90 TABLET | Refills: 2 | Status: SHIPPED | OUTPATIENT
Start: 2024-02-29

## 2024-04-02 RX ORDER — FUROSEMIDE 20 MG/1
20 TABLET ORAL DAILY
Qty: 90 TABLET | Refills: 1 | Status: SHIPPED | OUTPATIENT
Start: 2024-04-02

## 2024-04-02 NOTE — TELEPHONE ENCOUNTER
Chief Complaint   Patient presents with    Medication Refill     Last Appointment with Dr. Asael Nagel:  7/12/2023   Future Appointments   Date Time Provider Department Center   4/11/2024 11:15 AM Asael Nagel MD [facility]   5/9/2024  8:15 AM Asael Nagel MD [facility]

## 2024-04-04 ENCOUNTER — OFFICE VISIT (OUTPATIENT)
Age: 84
End: 2024-04-04
Payer: MEDICARE

## 2024-04-04 VITALS
BODY MASS INDEX: 30.75 KG/M2 | TEMPERATURE: 97.8 F | WEIGHT: 227 LBS | RESPIRATION RATE: 16 BRPM | HEART RATE: 86 BPM | DIASTOLIC BLOOD PRESSURE: 63 MMHG | HEIGHT: 72 IN | SYSTOLIC BLOOD PRESSURE: 97 MMHG | OXYGEN SATURATION: 94 %

## 2024-04-04 DIAGNOSIS — R09.89 CHEST CONGESTION: ICD-10-CM

## 2024-04-04 DIAGNOSIS — B35.6 TINEA CRURIS: ICD-10-CM

## 2024-04-04 DIAGNOSIS — J30.2 SEASONAL ALLERGIC RHINITIS, UNSPECIFIED TRIGGER: Primary | ICD-10-CM

## 2024-04-04 PROCEDURE — 99213 OFFICE O/P EST LOW 20 MIN: CPT | Performed by: STUDENT IN AN ORGANIZED HEALTH CARE EDUCATION/TRAINING PROGRAM

## 2024-04-04 PROCEDURE — 3074F SYST BP LT 130 MM HG: CPT | Performed by: STUDENT IN AN ORGANIZED HEALTH CARE EDUCATION/TRAINING PROGRAM

## 2024-04-04 PROCEDURE — 1123F ACP DISCUSS/DSCN MKR DOCD: CPT | Performed by: STUDENT IN AN ORGANIZED HEALTH CARE EDUCATION/TRAINING PROGRAM

## 2024-04-04 PROCEDURE — G8417 CALC BMI ABV UP PARAM F/U: HCPCS | Performed by: STUDENT IN AN ORGANIZED HEALTH CARE EDUCATION/TRAINING PROGRAM

## 2024-04-04 PROCEDURE — G8427 DOCREV CUR MEDS BY ELIG CLIN: HCPCS | Performed by: STUDENT IN AN ORGANIZED HEALTH CARE EDUCATION/TRAINING PROGRAM

## 2024-04-04 PROCEDURE — 1036F TOBACCO NON-USER: CPT | Performed by: STUDENT IN AN ORGANIZED HEALTH CARE EDUCATION/TRAINING PROGRAM

## 2024-04-04 PROCEDURE — 3078F DIAST BP <80 MM HG: CPT | Performed by: STUDENT IN AN ORGANIZED HEALTH CARE EDUCATION/TRAINING PROGRAM

## 2024-04-04 RX ORDER — FLUTICASONE PROPIONATE 50 MCG
1 SPRAY, SUSPENSION (ML) NASAL DAILY
Qty: 32 G | Refills: 1 | Status: SHIPPED | OUTPATIENT
Start: 2024-04-04

## 2024-04-04 RX ORDER — CLOTRIMAZOLE 1 %
CREAM (GRAM) TOPICAL
Qty: 60 G | Refills: 3 | Status: SHIPPED | OUTPATIENT
Start: 2024-04-04 | End: 2024-04-11

## 2024-04-04 RX ORDER — FEXOFENADINE HCL 180 MG/1
180 TABLET ORAL DAILY
Qty: 90 TABLET | Refills: 1 | Status: SHIPPED | OUTPATIENT
Start: 2024-04-04

## 2024-04-04 RX ORDER — GUAIFENESIN 600 MG/1
1200 TABLET, EXTENDED RELEASE ORAL 2 TIMES DAILY
Qty: 40 TABLET | Refills: 0 | Status: SHIPPED | OUTPATIENT
Start: 2024-04-04 | End: 2024-04-14

## 2024-04-04 SDOH — ECONOMIC STABILITY: HOUSING INSECURITY
IN THE LAST 12 MONTHS, WAS THERE A TIME WHEN YOU DID NOT HAVE A STEADY PLACE TO SLEEP OR SLEPT IN A SHELTER (INCLUDING NOW)?: NO

## 2024-04-04 SDOH — ECONOMIC STABILITY: FOOD INSECURITY: WITHIN THE PAST 12 MONTHS, THE FOOD YOU BOUGHT JUST DIDN'T LAST AND YOU DIDN'T HAVE MONEY TO GET MORE.: NEVER TRUE

## 2024-04-04 SDOH — ECONOMIC STABILITY: INCOME INSECURITY: HOW HARD IS IT FOR YOU TO PAY FOR THE VERY BASICS LIKE FOOD, HOUSING, MEDICAL CARE, AND HEATING?: NOT HARD AT ALL

## 2024-04-04 SDOH — ECONOMIC STABILITY: FOOD INSECURITY: WITHIN THE PAST 12 MONTHS, YOU WORRIED THAT YOUR FOOD WOULD RUN OUT BEFORE YOU GOT MONEY TO BUY MORE.: NEVER TRUE

## 2024-04-04 ASSESSMENT — PATIENT HEALTH QUESTIONNAIRE - PHQ9
SUM OF ALL RESPONSES TO PHQ QUESTIONS 1-9: 0
1. LITTLE INTEREST OR PLEASURE IN DOING THINGS: NOT AT ALL
SUM OF ALL RESPONSES TO PHQ QUESTIONS 1-9: 0
2. FEELING DOWN, DEPRESSED OR HOPELESS: NOT AT ALL
SUM OF ALL RESPONSES TO PHQ9 QUESTIONS 1 & 2: 0

## 2024-04-04 NOTE — PROGRESS NOTES
Verified name and birth date for privacy precautions.   Chart reviewed in preparation for today's visit.     Chief Complaint   Patient presents with    Congestion     Chest congestion and productive cough x 4 days           Health Maintenance Due   Topic    DTaP/Tdap/Td vaccine (1 - Tdap)    Shingles vaccine (1 of 2)    Respiratory Syncytial Virus (RSV) Pregnant or age 60 yrs+ (1 - 1-dose 60+ series)    Pneumococcal 65+ years Vaccine (2 of 2 - PCV)    COVID-19 Vaccine (2 - 2023-24 season)    Annual Wellness Visit (Medicare)          Wt Readings from Last 3 Encounters:   04/04/24 103 kg (227 lb)   07/12/23 105.4 kg (232 lb 6.4 oz)   05/17/23 104.8 kg (231 lb)     Temp Readings from Last 3 Encounters:   04/04/24 97.8 °F (36.6 °C) (Oral)   07/12/23 97.8 °F (36.6 °C)   05/17/23 97.6 °F (36.4 °C) (Temporal)     BP Readings from Last 3 Encounters:   04/04/24 97/63   07/12/23 138/75   05/17/23 (!) 144/71     Pulse Readings from Last 3 Encounters:   04/04/24 86   07/12/23 77   05/17/23 83       Social Determinants of Health     Tobacco Use: Medium Risk (4/4/2024)    Patient History     Smoking Tobacco Use: Former     Smokeless Tobacco Use: Never     Passive Exposure: Not on file   Alcohol Use: Not At Risk (10/24/2022)    AUDIT-C     Frequency of Alcohol Consumption: Monthly or less     Average Number of Drinks: 1 or 2     Frequency of Binge Drinking: Never   Financial Resource Strain: Low Risk  (4/4/2024)    Overall Financial Resource Strain (CARDIA)     Difficulty of Paying Living Expenses: Not hard at all   Food Insecurity: No Food Insecurity (4/4/2024)    Hunger Vital Sign     Worried About Running Out of Food in the Last Year: Never true     Ran Out of Food in the Last Year: Never true   Transportation Needs: Unknown (4/4/2024)    PRAPARE - Transportation     Lack of Transportation (Medical): Not on file     Lack of Transportation (Non-Medical): No   Physical Activity: Insufficiently Active (10/24/2022)    Exercise

## 2024-04-04 NOTE — PROGRESS NOTES
Checo Melendez (: 1940) is a 83 y.o. male patient here for evaluation of the following chief complaint(s):  Congestion (Chest congestion and productive cough x 4 days )       Subjective:   Pt here today for evaluation of runny nose x 1 week followed by chest congestion and now has a productive cough for about the last four days. He denies any fevers or chills, no sore throat. He reports no sinus congestion but says his nose has been constantly draining.     He also reports a rash on his buttocks and groin area that has been spreading that he would like to have evaluated today. He says it is not particularly itchy but has become flaky.     Review of Systems   All other systems reviewed and are negative.        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 Authorizing Provider   clotrimazole (LOTRIMIN AF) 1 % cream Apply topically 2 times daily for rash 24 Yes Ronaldo Valdovinos DO   guaiFENesin (MUCINEX) 600 MG extended release tablet Take 2 tablets by mouth 2 times daily for 10 days 24 Yes Ronaldo Valdovinos DO   fexofenadine (ALLEGRA) 180 MG tablet Take 1 tablet by mouth daily 24  Yes Ronaldo Valdovinos DO   fluticasone (FLONASE) 50 MCG/ACT nasal spray 1 spray by Each Nostril route daily 24  Yes Ronaldo Valdovinos DO   Dextromethorphan HBr 10 MG/15ML SYRP Take 10 mg by mouth every 4 hours as needed (for cough) 24  Yes Ronaldo Valdovinos DO   furosemide (LASIX) 20 MG tablet TAKE 1 TABLET BY MOUTH

## 2024-04-08 LAB — LEFT VENTRICULAR EJECTION FRACTION, EXTERNAL: NORMAL

## 2024-04-09 ENCOUNTER — TELEPHONE (OUTPATIENT)
Age: 84
End: 2024-04-09

## 2024-04-09 NOTE — TELEPHONE ENCOUNTER
Reason for call:   Pt's son  Ashu Melendez is requesting a call back. He states that patient is now in the hospital with Pneumonia. He wanted to  speak with his nurse in regards to his last appt.     Is this a new problem: Yes    Date of last appointment:  4/4/2024     Can we respond via [employer]: No    Best call back number:     Ashu Melendez () [phone] (Home)

## 2024-04-09 NOTE — TELEPHONE ENCOUNTER
Spoke with pt son Ashu, verified Idx2.  Ashu not listed on PHI so can listen only.  Wanted to let SRJ know pt was admitted to [facility] for PNA and is still in patient.  Son thinks pt will need possible rehab and PT for strengthening.    Advised pt son to discuss with hospitalist and  and that they should handle ordering.  Advised to return call after d/c for hospital f/up appt w/ SRJ.  Ashu voiced understanding.

## 2024-04-11 ENCOUNTER — TELEPHONE (OUTPATIENT)
Age: 84
End: 2024-04-11

## 2024-04-11 NOTE — TELEPHONE ENCOUNTER
Returned call to patient son Ashu (not on PHI), spoke with patient who is present verified pt IDx2 - pt gave verbal permission to speak with Ashu.      Ashu states he wants to touch base with Dr. Nagel about pts blood pressure, he says patient is currently admitted at [facility] for PNA.  Ashu would like to know why Dr. Nagel wants to keep patients blood pressure so low (per pt this is what he has always been told by PCP), Ashu says BP is always <100 systolic and pt has no energy and sleeps most of the day.  Advised Ashu that pt has not been seen since July 2023, also has known CAD, CHF, CKD III, where lots of meds can complicate each disease process especially BP meds.      Advised that as of now they should go only by what hospitalist are changing while admitted and at discharge.  Pt has hospital follow up appt scheduled for April 29th and I asked son to be present for this so HIPPA can be updated and SRJ can answer all questions and concerns.  Pt and son voiced understanding of plan.

## 2024-04-11 NOTE — TELEPHONE ENCOUNTER
Reason for call:  TC from Ashu Melendez, Son. Son not on PHI. Pt id verified. No pertinent information given to Son. Mr. Melendez called asking to speak with Dr. Nagel' nurse re his Father being discharged from the hospital on 04/12/24. PSR made Hosp follow up appt for pt, and advised Mr. Melendez nurse will call him once she becomes available. Mr. Melendez verbalized understanding.     Is this a new problem: Yes    Date of last appointment:  4/4/2024     Can we respond via [employer]: No    Best call back number: [phone]

## 2024-04-15 RX ORDER — CARVEDILOL 25 MG/1
TABLET ORAL
Qty: 180 TABLET | Refills: 0 | Status: SHIPPED | OUTPATIENT
Start: 2024-04-15

## 2024-04-15 NOTE — TELEPHONE ENCOUNTER
Chief Complaint   Patient presents with    Medication Refill     Last Appointment with Dr. Asael Nagel:  7/12/2023   Future Appointments   Date Time Provider Department Center   4/29/2024 11:15 AM Asael Nagel MD [facility]   5/9/2024  8:15 AM Asael Nagel MD [facility]

## 2024-04-16 ENCOUNTER — TELEPHONE (OUTPATIENT)
Age: 84
End: 2024-04-16

## 2024-04-16 NOTE — TELEPHONE ENCOUNTER
Reason for call:  Spoke with Demetria from [facility] . She was calling to inform Dr. GIRALDO she has open Home health for nursing, PT and OT for pt.    Is this a new problem: Yes    Date of last appointment:  4/4/2024     Can we respond via [employer]: No    Best call back number: Demetria with [facility]   [phone]

## 2024-04-22 ENCOUNTER — OFFICE VISIT (OUTPATIENT)
Age: 84
End: 2024-04-22
Payer: MEDICARE

## 2024-04-22 VITALS
OXYGEN SATURATION: 95 % | HEIGHT: 72 IN | SYSTOLIC BLOOD PRESSURE: 100 MMHG | RESPIRATION RATE: 15 BRPM | HEART RATE: 72 BPM | TEMPERATURE: 98 F | DIASTOLIC BLOOD PRESSURE: 61 MMHG | BODY MASS INDEX: 28.79 KG/M2 | WEIGHT: 212.6 LBS

## 2024-04-22 DIAGNOSIS — J44.1 COPD EXACERBATION (HCC): ICD-10-CM

## 2024-04-22 DIAGNOSIS — I50.22 CHRONIC SYSTOLIC (CONGESTIVE) HEART FAILURE (HCC): Primary | ICD-10-CM

## 2024-04-22 DIAGNOSIS — D69.6 THROMBOCYTOPENIA, UNSPECIFIED (HCC): ICD-10-CM

## 2024-04-22 DIAGNOSIS — N18.30 STAGE 3 CHRONIC KIDNEY DISEASE, UNSPECIFIED WHETHER STAGE 3A OR 3B CKD (HCC): ICD-10-CM

## 2024-04-22 DIAGNOSIS — Z09 HOSPITAL DISCHARGE FOLLOW-UP: ICD-10-CM

## 2024-04-22 DIAGNOSIS — I50.22 CHRONIC SYSTOLIC (CONGESTIVE) HEART FAILURE (HCC): ICD-10-CM

## 2024-04-22 PROCEDURE — 3078F DIAST BP <80 MM HG: CPT | Performed by: INTERNAL MEDICINE

## 2024-04-22 PROCEDURE — 1111F DSCHRG MED/CURRENT MED MERGE: CPT | Performed by: INTERNAL MEDICINE

## 2024-04-22 PROCEDURE — 3074F SYST BP LT 130 MM HG: CPT | Performed by: INTERNAL MEDICINE

## 2024-04-22 PROCEDURE — 1123F ACP DISCUSS/DSCN MKR DOCD: CPT | Performed by: INTERNAL MEDICINE

## 2024-04-22 PROCEDURE — 1036F TOBACCO NON-USER: CPT | Performed by: INTERNAL MEDICINE

## 2024-04-22 PROCEDURE — 3023F SPIROM DOC REV: CPT | Performed by: INTERNAL MEDICINE

## 2024-04-22 PROCEDURE — G8427 DOCREV CUR MEDS BY ELIG CLIN: HCPCS | Performed by: INTERNAL MEDICINE

## 2024-04-22 PROCEDURE — G8417 CALC BMI ABV UP PARAM F/U: HCPCS | Performed by: INTERNAL MEDICINE

## 2024-04-22 PROCEDURE — 99214 OFFICE O/P EST MOD 30 MIN: CPT | Performed by: INTERNAL MEDICINE

## 2024-04-22 RX ORDER — PREDNISONE 20 MG/1
20 TABLET ORAL 2 TIMES DAILY
Qty: 10 TABLET | Refills: 0 | Status: SHIPPED | OUTPATIENT
Start: 2024-04-22 | End: 2024-04-27

## 2024-04-22 RX ORDER — CARVEDILOL 25 MG/1
12.5 TABLET ORAL 2 TIMES DAILY WITH MEALS
Qty: 180 TABLET | Refills: 0 | Status: SHIPPED | OUTPATIENT
Start: 2024-04-22

## 2024-04-22 NOTE — PROGRESS NOTES
tablet  Commonly known as: COLCRYS     Dextromethorphan HBr 10 MG/15ML Syrp  Take 10 mg by mouth every 4 hours as needed (for cough)     furosemide 20 MG tablet  Commonly known as: LASIX  TAKE 1 TABLET BY MOUTH DAILY     MULTIVITAMIN PO     simvastatin 40 MG tablet  Commonly known as: ZOCOR  TAKE ONE TABLET BY MOUTH ONCE NIGHTLY     spironolactone 25 MG tablet  Commonly known as: ALDACTONE     vitamin D 50 MCG (2000 UT) Caps capsule  Commonly known as: CHOLECALCIFEROL            STOP taking these medications      fexofenadine 180 MG tablet  Commonly known as: ALLEGRA  Stopped by: Asael Nagel MD     fluticasone 50 MCG/ACT nasal spray  Commonly known as: FLONASE  Stopped by: Asael Nagel MD     GLUCOSAMINE-CHONDROITIN PO  Stopped by: Asael Nagel MD     losartan 50 MG tablet  Commonly known as: COZAAR  Stopped by: Asael Nagel MD     sildenafil 100 MG tablet  Commonly known as: VIAGRA  Stopped by: Asael Nagel MD     tamsulosin 0.4 MG capsule  Commonly known as: FLOMAX  Stopped by: Asael Nagel MD               Where to Get Your Medications        These medications were sent to [facility] [id number] [address] - P [phone] - F [phone]  [address] [facility] [address]      Phone: [phone]   carvedilol 25 MG tablet  predniSONE 20 MG tablet           Medications marked \"taking\" at this time  Outpatient Medications Marked as Taking for the 4/22/24 encounter (Office Visit) with Asael Nagel MD   Medication Sig Dispense Refill    carvedilol (COREG) 25 MG tablet Take 0.5 tablets by mouth 2 times daily (with meals) 180 tablet 0    predniSONE (DELTASONE) 20 MG tablet Take 1 tablet by mouth 2 times daily for 5 days 10 tablet 0    Dextromethorphan HBr 10 MG/15ML SYRP Take 10 mg by mouth every 4 hours as needed (for cough) 120 mL 0    furosemide (LASIX) 20 MG tablet TAKE 1 TABLET BY MOUTH DAILY 90 tablet 1    simvastatin (ZOCOR) 40 MG tablet TAKE ONE TABLET BY MOUTH ONCE

## 2024-04-23 LAB
ALBUMIN SERPL-MCNC: 3.2 G/DL (ref 3.5–5)
ALBUMIN/GLOB SERPL: 0.9 (ref 1.1–2.2)
ALP SERPL-CCNC: 120 U/L (ref 45–117)
ALT SERPL-CCNC: 56 U/L (ref 12–78)
ANION GAP SERPL CALC-SCNC: 4 MMOL/L (ref 5–15)
AST SERPL-CCNC: 32 U/L (ref 15–37)
BASOPHILS # BLD: 0.1 K/UL (ref 0–0.1)
BASOPHILS NFR BLD: 1 % (ref 0–1)
BILIRUB SERPL-MCNC: 0.6 MG/DL (ref 0.2–1)
BUN SERPL-MCNC: 46 MG/DL (ref 6–20)
BUN/CREAT SERPL: 21 (ref 12–20)
CALCIUM SERPL-MCNC: 10.3 MG/DL (ref 8.5–10.1)
CHLORIDE SERPL-SCNC: 103 MMOL/L (ref 97–108)
CO2 SERPL-SCNC: 28 MMOL/L (ref 21–32)
CREAT SERPL-MCNC: 2.18 MG/DL (ref 0.7–1.3)
DIFFERENTIAL METHOD BLD: ABNORMAL
EOSINOPHIL # BLD: 0.3 K/UL (ref 0–0.4)
EOSINOPHIL NFR BLD: 4 % (ref 0–7)
ERYTHROCYTE [DISTWIDTH] IN BLOOD BY AUTOMATED COUNT: 15.6 % (ref 11.5–14.5)
GLOBULIN SER CALC-MCNC: 3.5 G/DL (ref 2–4)
GLUCOSE SERPL-MCNC: 119 MG/DL (ref 65–100)
HCT VFR BLD AUTO: 42.5 % (ref 36.6–50.3)
HGB BLD-MCNC: 13.6 G/DL (ref 12.1–17)
IMM GRANULOCYTES # BLD AUTO: 0.1 K/UL (ref 0–0.04)
IMM GRANULOCYTES NFR BLD AUTO: 1 % (ref 0–0.5)
LYMPHOCYTES # BLD: 1.2 K/UL (ref 0.8–3.5)
LYMPHOCYTES NFR BLD: 15 % (ref 12–49)
MCH RBC QN AUTO: 29.4 PG (ref 26–34)
MCHC RBC AUTO-ENTMCNC: 32 G/DL (ref 30–36.5)
MCV RBC AUTO: 91.8 FL (ref 80–99)
MONOCYTES # BLD: 0.9 K/UL (ref 0–1)
MONOCYTES NFR BLD: 11 % (ref 5–13)
NEUTS SEG # BLD: 5.7 K/UL (ref 1.8–8)
NEUTS SEG NFR BLD: 68 % (ref 32–75)
NRBC # BLD: 0 K/UL (ref 0–0.01)
NRBC BLD-RTO: 0 PER 100 WBC
NT PRO BNP: 1451 PG/ML
PLATELET # BLD AUTO: 248 K/UL (ref 150–400)
PMV BLD AUTO: 11.5 FL (ref 8.9–12.9)
POTASSIUM SERPL-SCNC: 5.3 MMOL/L (ref 3.5–5.1)
PROT SERPL-MCNC: 6.7 G/DL (ref 6.4–8.2)
RBC # BLD AUTO: 4.63 M/UL (ref 4.1–5.7)
SODIUM SERPL-SCNC: 135 MMOL/L (ref 136–145)
WBC # BLD AUTO: 8.3 K/UL (ref 4.1–11.1)

## 2024-04-29 ENCOUNTER — TELEPHONE (OUTPATIENT)
Age: 84
End: 2024-04-29

## 2024-04-29 NOTE — TELEPHONE ENCOUNTER
Spoke with patient to confirm readings.  BP today 108/48.  Pt is feeling well at this time.    Reviewed w/ Asael Nagel MD and no changes at this time - pt to continue to monitor, pt voiced understanding.

## 2024-04-29 NOTE — TELEPHONE ENCOUNTER
Reason for call:  PT reported that his systolic for Friday 4/26 was 58 and today 4/29 is 38    Is this a new problem: No    Date of last appointment:  4/22/2024     Can we respond via Stratos: No    Best call back number:     Checo Melendez (Self) [phone] (Mobile)

## 2024-05-07 SDOH — HEALTH STABILITY: PHYSICAL HEALTH: ON AVERAGE, HOW MANY MINUTES DO YOU ENGAGE IN EXERCISE AT THIS LEVEL?: 10 MIN

## 2024-05-07 SDOH — HEALTH STABILITY: PHYSICAL HEALTH: ON AVERAGE, HOW MANY DAYS PER WEEK DO YOU ENGAGE IN MODERATE TO STRENUOUS EXERCISE (LIKE A BRISK WALK)?: 3 DAYS

## 2024-05-07 ASSESSMENT — PATIENT HEALTH QUESTIONNAIRE - PHQ9
SUM OF ALL RESPONSES TO PHQ QUESTIONS 1-9: 0
2. FEELING DOWN, DEPRESSED OR HOPELESS: NOT AT ALL
SUM OF ALL RESPONSES TO PHQ QUESTIONS 1-9: 0
SUM OF ALL RESPONSES TO PHQ9 QUESTIONS 1 & 2: 0
SUM OF ALL RESPONSES TO PHQ QUESTIONS 1-9: 0
SUM OF ALL RESPONSES TO PHQ QUESTIONS 1-9: 0
1. LITTLE INTEREST OR PLEASURE IN DOING THINGS: NOT AT ALL

## 2024-05-07 ASSESSMENT — LIFESTYLE VARIABLES
HOW OFTEN DO YOU HAVE SIX OR MORE DRINKS ON ONE OCCASION: 1
HOW MANY STANDARD DRINKS CONTAINING ALCOHOL DO YOU HAVE ON A TYPICAL DAY: PATIENT DOES NOT DRINK
HOW MANY STANDARD DRINKS CONTAINING ALCOHOL DO YOU HAVE ON A TYPICAL DAY: 0

## 2024-05-09 ENCOUNTER — OFFICE VISIT (OUTPATIENT)
Age: 84
End: 2024-05-09
Payer: MEDICARE

## 2024-05-09 VITALS
HEIGHT: 72 IN | SYSTOLIC BLOOD PRESSURE: 109 MMHG | TEMPERATURE: 97.8 F | DIASTOLIC BLOOD PRESSURE: 69 MMHG | RESPIRATION RATE: 15 BRPM | HEART RATE: 51 BPM | WEIGHT: 225.2 LBS | OXYGEN SATURATION: 95 % | BODY MASS INDEX: 30.5 KG/M2

## 2024-05-09 DIAGNOSIS — Z87.01 HISTORY OF RECENT PNEUMONIA: ICD-10-CM

## 2024-05-09 DIAGNOSIS — E78.2 MIXED HYPERLIPIDEMIA: ICD-10-CM

## 2024-05-09 DIAGNOSIS — I25.10 ATHEROSCLEROSIS OF NATIVE CORONARY ARTERY OF NATIVE HEART WITHOUT ANGINA PECTORIS: ICD-10-CM

## 2024-05-09 DIAGNOSIS — M1A.29X0 DRUG-INDUCED CHRONIC GOUT OF MULTIPLE SITES WITHOUT TOPHUS: ICD-10-CM

## 2024-05-09 DIAGNOSIS — Z00.00 MEDICARE ANNUAL WELLNESS VISIT, SUBSEQUENT: Primary | ICD-10-CM

## 2024-05-09 DIAGNOSIS — N18.30 STAGE 3 CHRONIC KIDNEY DISEASE, UNSPECIFIED WHETHER STAGE 3A OR 3B CKD (HCC): ICD-10-CM

## 2024-05-09 DIAGNOSIS — I10 PRIMARY HYPERTENSION: ICD-10-CM

## 2024-05-09 DIAGNOSIS — I50.22 CHRONIC SYSTOLIC CONGESTIVE HEART FAILURE (HCC): ICD-10-CM

## 2024-05-09 PROCEDURE — 99214 OFFICE O/P EST MOD 30 MIN: CPT | Performed by: INTERNAL MEDICINE

## 2024-05-09 PROCEDURE — 1036F TOBACCO NON-USER: CPT | Performed by: INTERNAL MEDICINE

## 2024-05-09 PROCEDURE — G0439 PPPS, SUBSEQ VISIT: HCPCS | Performed by: INTERNAL MEDICINE

## 2024-05-09 PROCEDURE — G8427 DOCREV CUR MEDS BY ELIG CLIN: HCPCS | Performed by: INTERNAL MEDICINE

## 2024-05-09 PROCEDURE — 3078F DIAST BP <80 MM HG: CPT | Performed by: INTERNAL MEDICINE

## 2024-05-09 PROCEDURE — 3074F SYST BP LT 130 MM HG: CPT | Performed by: INTERNAL MEDICINE

## 2024-05-09 PROCEDURE — 1123F ACP DISCUSS/DSCN MKR DOCD: CPT | Performed by: INTERNAL MEDICINE

## 2024-05-09 PROCEDURE — G8417 CALC BMI ABV UP PARAM F/U: HCPCS | Performed by: INTERNAL MEDICINE

## 2024-05-09 RX ORDER — TAMSULOSIN HYDROCHLORIDE 0.4 MG/1
0.4 CAPSULE ORAL 2 TIMES DAILY
Qty: 60 CAPSULE | Refills: 4 | Status: SHIPPED | OUTPATIENT
Start: 2024-05-09

## 2024-05-09 RX ORDER — DOCUSATE SODIUM 100 MG/1
100 CAPSULE, LIQUID FILLED ORAL DAILY PRN
Qty: 30 CAPSULE | Refills: 0 | COMMUNITY
Start: 2024-05-09

## 2024-05-09 RX ORDER — ALBUTEROL SULFATE 90 UG/1
2 AEROSOL, METERED RESPIRATORY (INHALATION)
Qty: 18 G | Refills: 3 | COMMUNITY
Start: 2024-05-09

## 2024-05-09 RX ORDER — GUAIFENESIN 600 MG/1
1200 TABLET, EXTENDED RELEASE ORAL 2 TIMES DAILY
Qty: 120 TABLET | Refills: 4 | Status: SHIPPED | OUTPATIENT
Start: 2024-05-09 | End: 2024-10-06

## 2024-05-09 NOTE — PATIENT INSTRUCTIONS
feeling in the chest.     Sweating.     Shortness of breath.     Pain, pressure, or a strange feeling in the back, neck, jaw, or upper belly or in one or both shoulders or arms.     Lightheadedness or sudden weakness.     A fast or irregular heartbeat.   After you call 911, the  may tell you to chew 1 adult-strength or 2 to 4 low-dose aspirin. Wait for an ambulance. Do not try to drive yourself.  Watch closely for changes in your health, and be sure to contact your doctor if you have any problems.  Where can you learn more?  Go to https://www.MetaPack.net/patientEd and enter F075 to learn more about \"A Healthy Heart: Care Instructions.\"  Current as of: June 24, 2023               Content Version: 14.0  © 5241-4160 [employer].   Care instructions adapted under license by [employer]. If you have questions about a medical condition or this instruction, always ask your healthcare professional. [employer] disclaims any warranty or liability for your use of this information.      Personalized Preventive Plan for Checo Melendez - 5/9/2024  Medicare offers a range of preventive health benefits. Some of the tests and screenings are paid in full while other may be subject to a deductible, co-insurance, and/or copay.    Some of these benefits include a comprehensive review of your medical history including lifestyle, illnesses that may run in your family, and various assessments and screenings as appropriate.    After reviewing your medical record and screening and assessments performed today your provider may have ordered immunizations, labs, imaging, and/or referrals for you.  A list of these orders (if applicable) as well as your Preventive Care list are included within your After Visit Summary for your review.    Other Preventive Recommendations:    A preventive eye exam performed by an eye specialist is recommended every 1-2 years to screen for glaucoma; cataracts, macular

## 2024-05-09 NOTE — PROGRESS NOTES
legs  Musculoskeletal: normal range of motion, no joint swelling, deformity or tenderness  Neurologic: gait and coordination normal and speech normal       Allergies   Allergen Reactions    Tirofiban Other (See Comments)     Queasy.     Prior to Visit Medications    Medication Sig Taking? Authorizing Provider   carvedilol (COREG) 25 MG tablet Take 0.5 tablets by mouth 2 times daily (with meals) Yes Asael Nagel MD   furosemide (LASIX) 20 MG tablet TAKE 1 TABLET BY MOUTH DAILY  Patient taking differently: Take 1 tablet by mouth daily Per pt-  Ro increased to 40 mg daily Yes Asael Nagel MD   simvastatin (ZOCOR) 40 MG tablet TAKE ONE TABLET BY MOUTH ONCE NIGHTLY Yes Asael Nagel MD   clopidogrel (PLAVIX) 75 MG tablet TAKE ONE TABLET BY MOUTH DAILY Yes Asael Nagel MD   allopurinol (ZYLOPRIM) 300 MG tablet TAKE ONE TABLET BY MOUTH DAILY Yes Asael Nagel MD   Multiple Vitamin (MULTIVITAMIN PO) Take by mouth Yes Automatic Reconciliation, Ar   aspirin 81 MG EC tablet Take 1 tablet by mouth daily Yes Automatic Reconciliation, Ar   colchicine (COLCRYS) 0.6 MG tablet Take 1 tablet by mouth 2 times daily as needed  Patient not taking: Reported on 4/16/2024  Automatic Reconciliation, Ar       CareTeam (Including outside providers/suppliers regularly involved in providing care):   Patient Care Team:  Asael Nagel MD as PCP - General  Asael Nagel MD as PCP - Empaneled Provider  Sheri Diaz RN as Ambulatory Care Manager  Moises Palafox MD (Nephrology)     Reviewed and updated this visit:  Tobacco  Allergies  Meds  Med Hx  Surg Hx  Soc Hx  Fam Hx

## 2024-06-04 ENCOUNTER — TRANSCRIBE ORDERS (OUTPATIENT)
Facility: HOSPITAL | Age: 84
End: 2024-06-04

## 2024-06-04 DIAGNOSIS — I50.22 CHRONIC SYSTOLIC HEART FAILURE (HCC): Primary | ICD-10-CM

## 2024-07-09 RX ORDER — ALLOPURINOL 300 MG/1
300 TABLET ORAL DAILY
Qty: 90 TABLET | Refills: 2 | Status: SHIPPED | OUTPATIENT
Start: 2024-07-09

## 2024-07-11 RX ORDER — CARVEDILOL 25 MG/1
TABLET ORAL
Qty: 180 TABLET | Refills: 0 | Status: SHIPPED | OUTPATIENT
Start: 2024-07-11

## 2024-07-21 RX ORDER — CLOPIDOGREL BISULFATE 75 MG/1
75 TABLET ORAL DAILY
Qty: 90 TABLET | Refills: 1 | Status: SHIPPED | OUTPATIENT
Start: 2024-07-21

## 2024-09-04 ENCOUNTER — HOSPITAL ENCOUNTER (OUTPATIENT)
Facility: HOSPITAL | Age: 84
Setting detail: RECURRING SERIES
Discharge: HOME | End: 2024-09-07
Payer: MEDICARE

## 2024-09-04 VITALS — WEIGHT: 237 LBS | BODY MASS INDEX: 32.1 KG/M2 | HEIGHT: 72 IN

## 2024-09-04 PROCEDURE — 93797 PHYS/QHP OP CAR RHAB WO ECG: CPT

## 2024-09-04 PROCEDURE — 93798 PHYS/QHP OP CAR RHAB W/ECG: CPT

## 2024-09-04 ASSESSMENT — PATIENT HEALTH QUESTIONNAIRE - PHQ9
SUM OF ALL RESPONSES TO PHQ QUESTIONS 1-9: 3
10. IF YOU CHECKED OFF ANY PROBLEMS, HOW DIFFICULT HAVE THESE PROBLEMS MADE IT FOR YOU TO DO YOUR WORK, TAKE CARE OF THINGS AT HOME, OR GET ALONG WITH OTHER PEOPLE: NOT DIFFICULT AT ALL
SUM OF ALL RESPONSES TO PHQ QUESTIONS 1-9: 3
5. POOR APPETITE OR OVEREATING: NOT AT ALL
SUM OF ALL RESPONSES TO PHQ QUESTIONS 1-9: 3
SUM OF ALL RESPONSES TO PHQ QUESTIONS 1-9: 3
8. MOVING OR SPEAKING SO SLOWLY THAT OTHER PEOPLE COULD HAVE NOTICED. OR THE OPPOSITE, BEING SO FIGETY OR RESTLESS THAT YOU HAVE BEEN MOVING AROUND A LOT MORE THAN USUAL: NOT AT ALL
3. TROUBLE FALLING OR STAYING ASLEEP: NEARLY EVERY DAY
7. TROUBLE CONCENTRATING ON THINGS, SUCH AS READING THE NEWSPAPER OR WATCHING TELEVISION: NOT AT ALL
1. LITTLE INTEREST OR PLEASURE IN DOING THINGS: NOT AT ALL
4. FEELING TIRED OR HAVING LITTLE ENERGY: NOT AT ALL
9. THOUGHTS THAT YOU WOULD BE BETTER OFF DEAD, OR OF HURTING YOURSELF: NOT AT ALL
6. FEELING BAD ABOUT YOURSELF - OR THAT YOU ARE A FAILURE OR HAVE LET YOURSELF OR YOUR FAMILY DOWN: NOT AT ALL
SUM OF ALL RESPONSES TO PHQ9 QUESTIONS 1 & 2: 0
2. FEELING DOWN, DEPRESSED OR HOPELESS: NOT AT ALL

## 2024-09-04 ASSESSMENT — LIFESTYLE VARIABLES: SMOKELESS_TOBACCO: NO

## 2024-09-04 ASSESSMENT — EXERCISE STRESS TEST
PEAK_METS: 1.8
PEAK_BP: 128/70
PEAK_HR: 77
PEAK_RPE: 10
PEAK_BP: 128/70

## 2024-09-04 ASSESSMENT — EJECTION FRACTION: EF_VALUE: 30

## 2024-09-04 NOTE — CARDIO/PULMONARY
recommendations. Education manual given to patient and reviewed. Patient will attend cardiac rehab 2 times/week which will include both exercise and education sessions.    Intake Start Time: 1200  Intake End Time: 1400    TONY HARTMAN RN

## 2024-09-11 ENCOUNTER — HOSPITAL ENCOUNTER (OUTPATIENT)
Facility: HOSPITAL | Age: 84
Setting detail: RECURRING SERIES
Discharge: HOME | End: 2024-09-14
Payer: MEDICARE

## 2024-09-11 VITALS — BODY MASS INDEX: 32.28 KG/M2 | WEIGHT: 238 LBS

## 2024-09-11 PROCEDURE — 93798 PHYS/QHP OP CAR RHAB W/ECG: CPT

## 2024-09-11 ASSESSMENT — EXERCISE STRESS TEST: PEAK_METS: 1.8

## 2024-09-12 ENCOUNTER — HOSPITAL ENCOUNTER (OUTPATIENT)
Facility: HOSPITAL | Age: 84
Setting detail: RECURRING SERIES
Discharge: HOME | End: 2024-09-15
Payer: MEDICARE

## 2024-09-12 VITALS — BODY MASS INDEX: 32.28 KG/M2 | WEIGHT: 238 LBS

## 2024-09-12 PROCEDURE — 93798 PHYS/QHP OP CAR RHAB W/ECG: CPT

## 2024-09-12 ASSESSMENT — EXERCISE STRESS TEST
PEAK_RPE: 12
PEAK_METS: 1.8

## 2024-09-16 ENCOUNTER — HOSPITAL ENCOUNTER (OUTPATIENT)
Facility: HOSPITAL | Age: 84
Setting detail: RECURRING SERIES
Discharge: HOME | End: 2024-09-19
Payer: MEDICARE

## 2024-09-16 VITALS — WEIGHT: 235 LBS | BODY MASS INDEX: 31.87 KG/M2

## 2024-09-16 PROCEDURE — 93797 PHYS/QHP OP CAR RHAB WO ECG: CPT

## 2024-09-16 PROCEDURE — 93798 PHYS/QHP OP CAR RHAB W/ECG: CPT

## 2024-09-16 ASSESSMENT — EXERCISE STRESS TEST
PEAK_METS: 1.8
PEAK_RPE: 12

## 2024-09-18 ENCOUNTER — HOSPITAL ENCOUNTER (OUTPATIENT)
Facility: HOSPITAL | Age: 84
Setting detail: RECURRING SERIES
Discharge: HOME | End: 2024-09-21
Payer: MEDICARE

## 2024-09-18 VITALS — BODY MASS INDEX: 32.41 KG/M2 | WEIGHT: 239 LBS

## 2024-09-18 PROCEDURE — 93798 PHYS/QHP OP CAR RHAB W/ECG: CPT

## 2024-09-18 ASSESSMENT — EXERCISE STRESS TEST
PEAK_METS: 1.8
PEAK_RPE: 12

## 2024-09-23 ENCOUNTER — HOSPITAL ENCOUNTER (OUTPATIENT)
Facility: HOSPITAL | Age: 84
Setting detail: RECURRING SERIES
Discharge: HOME | End: 2024-09-26
Payer: MEDICARE

## 2024-09-23 VITALS — BODY MASS INDEX: 32.01 KG/M2 | WEIGHT: 236 LBS

## 2024-09-23 PROCEDURE — 93798 PHYS/QHP OP CAR RHAB W/ECG: CPT

## 2024-09-23 ASSESSMENT — EXERCISE STRESS TEST
PEAK_RPE: 12
PEAK_METS: 1.8

## 2024-09-25 ENCOUNTER — HOSPITAL ENCOUNTER (OUTPATIENT)
Facility: HOSPITAL | Age: 84
Setting detail: RECURRING SERIES
End: 2024-09-25
Payer: MEDICARE

## 2024-09-30 ENCOUNTER — HOSPITAL ENCOUNTER (OUTPATIENT)
Facility: HOSPITAL | Age: 84
Setting detail: RECURRING SERIES
Discharge: HOME | End: 2024-10-03
Payer: MEDICARE

## 2024-09-30 VITALS — WEIGHT: 237 LBS | BODY MASS INDEX: 32.14 KG/M2

## 2024-09-30 PROCEDURE — 93798 PHYS/QHP OP CAR RHAB W/ECG: CPT

## 2024-09-30 ASSESSMENT — EXERCISE STRESS TEST
PEAK_METS: 1.8
PEAK_RPE: 12

## 2024-10-02 ENCOUNTER — HOSPITAL ENCOUNTER (OUTPATIENT)
Facility: HOSPITAL | Age: 84
Setting detail: RECURRING SERIES
Discharge: HOME | End: 2024-10-05
Payer: MEDICARE

## 2024-10-02 VITALS — BODY MASS INDEX: 32.01 KG/M2 | WEIGHT: 236 LBS

## 2024-10-02 PROCEDURE — 93798 PHYS/QHP OP CAR RHAB W/ECG: CPT

## 2024-10-02 ASSESSMENT — EXERCISE STRESS TEST
PEAK_RPE: 12
PEAK_METS: 1.8

## 2024-10-04 RX ORDER — TAMSULOSIN HYDROCHLORIDE 0.4 MG/1
CAPSULE ORAL
Qty: 60 CAPSULE | Refills: 4 | Status: SHIPPED | OUTPATIENT
Start: 2024-10-04

## 2024-10-04 RX ORDER — CARVEDILOL 25 MG/1
25 TABLET ORAL 2 TIMES DAILY
Qty: 180 TABLET | Refills: 2 | Status: SHIPPED | OUTPATIENT
Start: 2024-10-04

## 2024-10-07 ENCOUNTER — HOSPITAL ENCOUNTER (OUTPATIENT)
Facility: HOSPITAL | Age: 84
Setting detail: RECURRING SERIES
Discharge: HOME | End: 2024-10-10
Payer: MEDICARE

## 2024-10-07 VITALS — BODY MASS INDEX: 31.87 KG/M2 | WEIGHT: 235 LBS

## 2024-10-07 PROCEDURE — 93798 PHYS/QHP OP CAR RHAB W/ECG: CPT

## 2024-10-07 ASSESSMENT — EXERCISE STRESS TEST
PEAK_METS: 1.8
PEAK_RPE: 12

## 2024-10-09 ENCOUNTER — HOSPITAL ENCOUNTER (OUTPATIENT)
Facility: HOSPITAL | Age: 84
Setting detail: RECURRING SERIES
Discharge: HOME | End: 2024-10-12
Payer: MEDICARE

## 2024-10-09 VITALS — BODY MASS INDEX: 32.01 KG/M2 | WEIGHT: 236 LBS

## 2024-10-09 PROCEDURE — 93798 PHYS/QHP OP CAR RHAB W/ECG: CPT

## 2024-10-09 ASSESSMENT — EXERCISE STRESS TEST
PEAK_RPE: 12
PEAK_METS: 1.8

## 2024-10-14 ENCOUNTER — HOSPITAL ENCOUNTER (OUTPATIENT)
Facility: HOSPITAL | Age: 84
Setting detail: RECURRING SERIES
Discharge: HOME | End: 2024-10-17
Payer: MEDICARE

## 2024-10-14 VITALS — BODY MASS INDEX: 32.01 KG/M2 | WEIGHT: 236 LBS

## 2024-10-14 PROCEDURE — 93798 PHYS/QHP OP CAR RHAB W/ECG: CPT

## 2024-10-14 ASSESSMENT — EXERCISE STRESS TEST
PEAK_METS: 3.1
PEAK_RPE: 12

## 2024-10-16 ENCOUNTER — HOSPITAL ENCOUNTER (OUTPATIENT)
Facility: HOSPITAL | Age: 84
Setting detail: RECURRING SERIES
End: 2024-10-16
Payer: MEDICARE

## 2024-10-21 ENCOUNTER — HOSPITAL ENCOUNTER (OUTPATIENT)
Facility: HOSPITAL | Age: 84
Setting detail: RECURRING SERIES
Discharge: HOME | End: 2024-10-24
Payer: MEDICARE

## 2024-10-21 VITALS — BODY MASS INDEX: 31.87 KG/M2 | WEIGHT: 235 LBS

## 2024-10-21 PROCEDURE — 93798 PHYS/QHP OP CAR RHAB W/ECG: CPT

## 2024-10-21 ASSESSMENT — EXERCISE STRESS TEST
PEAK_RPE: 12
PEAK_METS: 3.1

## 2024-10-23 ENCOUNTER — HOSPITAL ENCOUNTER (OUTPATIENT)
Facility: HOSPITAL | Age: 84
Setting detail: RECURRING SERIES
Discharge: HOME | End: 2024-10-26
Payer: MEDICARE

## 2024-10-23 VITALS — WEIGHT: 235 LBS | BODY MASS INDEX: 31.87 KG/M2

## 2024-10-23 PROCEDURE — 93798 PHYS/QHP OP CAR RHAB W/ECG: CPT

## 2024-10-23 ASSESSMENT — EXERCISE STRESS TEST
PEAK_METS: 3.1
PEAK_RPE: 12

## 2024-10-28 ENCOUNTER — HOSPITAL ENCOUNTER (OUTPATIENT)
Facility: HOSPITAL | Age: 84
Setting detail: RECURRING SERIES
Discharge: HOME | End: 2024-10-31
Payer: MEDICARE

## 2024-10-28 VITALS — BODY MASS INDEX: 31.6 KG/M2 | WEIGHT: 233 LBS

## 2024-10-28 PROCEDURE — 93798 PHYS/QHP OP CAR RHAB W/ECG: CPT

## 2024-10-28 ASSESSMENT — EXERCISE STRESS TEST
PEAK_RPE: 12
PEAK_METS: 3.1

## 2024-10-30 ENCOUNTER — APPOINTMENT (OUTPATIENT)
Facility: HOSPITAL | Age: 84
End: 2024-10-30
Payer: MEDICARE

## 2024-11-04 ENCOUNTER — HOSPITAL ENCOUNTER (OUTPATIENT)
Facility: HOSPITAL | Age: 84
Setting detail: RECURRING SERIES
Discharge: HOME | End: 2024-11-07
Payer: MEDICARE

## 2024-11-04 ENCOUNTER — OFFICE VISIT (OUTPATIENT)
Age: 84
End: 2024-11-04
Payer: MEDICARE

## 2024-11-04 VITALS
RESPIRATION RATE: 14 BRPM | HEIGHT: 72 IN | DIASTOLIC BLOOD PRESSURE: 79 MMHG | WEIGHT: 237 LBS | HEART RATE: 86 BPM | TEMPERATURE: 97.9 F | OXYGEN SATURATION: 97 % | BODY MASS INDEX: 32.1 KG/M2 | SYSTOLIC BLOOD PRESSURE: 146 MMHG

## 2024-11-04 VITALS — BODY MASS INDEX: 32.01 KG/M2 | WEIGHT: 236 LBS

## 2024-11-04 DIAGNOSIS — L98.9 SKIN LESION OF FACE: Primary | ICD-10-CM

## 2024-11-04 DIAGNOSIS — I50.22 CHRONIC SYSTOLIC CONGESTIVE HEART FAILURE (HCC): ICD-10-CM

## 2024-11-04 DIAGNOSIS — I10 PRIMARY HYPERTENSION: ICD-10-CM

## 2024-11-04 DIAGNOSIS — N18.30 STAGE 3 CHRONIC KIDNEY DISEASE, UNSPECIFIED WHETHER STAGE 3A OR 3B CKD (HCC): ICD-10-CM

## 2024-11-04 LAB
ALBUMIN SERPL-MCNC: 3.6 G/DL (ref 3.5–5)
ALBUMIN/GLOB SERPL: 1.2 (ref 1.1–2.2)
ALP SERPL-CCNC: 124 U/L (ref 45–117)
ALT SERPL-CCNC: 16 U/L (ref 12–78)
ANION GAP SERPL CALC-SCNC: 2 MMOL/L (ref 2–12)
AST SERPL-CCNC: 17 U/L (ref 15–37)
BASOPHILS # BLD: 0.1 K/UL (ref 0–0.1)
BASOPHILS NFR BLD: 1 % (ref 0–1)
BILIRUB SERPL-MCNC: 0.8 MG/DL (ref 0.2–1)
BUN SERPL-MCNC: 26 MG/DL (ref 6–20)
BUN/CREAT SERPL: 16 (ref 12–20)
CALCIUM SERPL-MCNC: 9.4 MG/DL (ref 8.5–10.1)
CHLORIDE SERPL-SCNC: 107 MMOL/L (ref 97–108)
CO2 SERPL-SCNC: 33 MMOL/L (ref 21–32)
CREAT SERPL-MCNC: 1.66 MG/DL (ref 0.7–1.3)
DIFFERENTIAL METHOD BLD: ABNORMAL
EOSINOPHIL # BLD: 0.2 K/UL (ref 0–0.4)
EOSINOPHIL NFR BLD: 3 % (ref 0–7)
ERYTHROCYTE [DISTWIDTH] IN BLOOD BY AUTOMATED COUNT: 16.8 % (ref 11.5–14.5)
GLOBULIN SER CALC-MCNC: 3 G/DL (ref 2–4)
GLUCOSE SERPL-MCNC: 94 MG/DL (ref 65–100)
HCT VFR BLD AUTO: 46.2 % (ref 36.6–50.3)
HGB BLD-MCNC: 14.1 G/DL (ref 12.1–17)
IMM GRANULOCYTES # BLD AUTO: 0 K/UL (ref 0–0.04)
IMM GRANULOCYTES NFR BLD AUTO: 0 % (ref 0–0.5)
LYMPHOCYTES # BLD: 1.1 K/UL (ref 0.8–3.5)
LYMPHOCYTES NFR BLD: 15 % (ref 12–49)
MCH RBC QN AUTO: 27.6 PG (ref 26–34)
MCHC RBC AUTO-ENTMCNC: 30.5 G/DL (ref 30–36.5)
MCV RBC AUTO: 90.4 FL (ref 80–99)
MONOCYTES # BLD: 0.8 K/UL (ref 0–1)
MONOCYTES NFR BLD: 12 % (ref 5–13)
NEUTS SEG # BLD: 5 K/UL (ref 1.8–8)
NEUTS SEG NFR BLD: 69 % (ref 32–75)
NRBC # BLD: 0 K/UL (ref 0–0.01)
NRBC BLD-RTO: 0 PER 100 WBC
PLATELET # BLD AUTO: 135 K/UL (ref 150–400)
POTASSIUM SERPL-SCNC: 4.6 MMOL/L (ref 3.5–5.1)
PROT SERPL-MCNC: 6.6 G/DL (ref 6.4–8.2)
RBC # BLD AUTO: 5.11 M/UL (ref 4.1–5.7)
SODIUM SERPL-SCNC: 142 MMOL/L (ref 136–145)
WBC # BLD AUTO: 7.2 K/UL (ref 4.1–11.1)

## 2024-11-04 PROCEDURE — 93798 PHYS/QHP OP CAR RHAB W/ECG: CPT

## 2024-11-04 PROCEDURE — 99214 OFFICE O/P EST MOD 30 MIN: CPT | Performed by: INTERNAL MEDICINE

## 2024-11-04 RX ORDER — CARVEDILOL 25 MG/1
12.5 TABLET ORAL 2 TIMES DAILY
COMMUNITY
Start: 2024-11-04

## 2024-11-04 RX ORDER — FUROSEMIDE 40 MG/1
40 TABLET ORAL DAILY
COMMUNITY
Start: 2024-11-04

## 2024-11-04 ASSESSMENT — EXERCISE STRESS TEST
PEAK_RPE: 12
PEAK_METS: 3.1

## 2024-11-04 NOTE — PROGRESS NOTES
HPI:  Checo Melendez is a 84 y.o. year old male who is here for a follow-up visit.  He has developed a lesion on the right side of his face has been present for several weeks now and gradually increasing in size.  It is been irritated and has bled intermittently.  No trauma that he is aware of.  No fevers or chills.  He did have to cancel his nephrology appointment and its now been pushed back to January.  He did see the cardiologist back in June and his diuretics were increased.  He continues to have some dyspnea on exertion.  No PND or orthopnea.  No nausea or vomiting.  Small amount of edema.  No gout.      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 Authorizing Provider   furosemide (LASIX) 40 MG tablet Take 1 tablet by mouth daily 11/4/24  Yes Asael Nagel MD   carvedilol (COREG) 25 MG tablet Take 0.5 tablets by mouth 2 times daily 11/4/24  Yes Asael Nagel MD   tamsulosin (FLOMAX) 0.4 MG capsule TAKE 1 CAPSULE BY MOUTH EVERY MORNING AND TAKE 1 CAPSULE BY MOUTH EVERY NIGHT AT BEDTIME 10/4/24  Yes Asael Nagel MD   clopidogrel (PLAVIX) 75 MG tablet TAKE 1 TABLET BY MOUTH DAILY 7/21/24  Yes Asael Nagel MD   allopurinol (ZYLOPRIM) 300 MG tablet TAKE 1 TABLET BY MOUTH DAILY 7/9/24  Yes Asael Nagel MD   docusate sodium (COLACE) 100 MG capsule Take 1 capsule

## 2024-11-06 ENCOUNTER — HOSPITAL ENCOUNTER (OUTPATIENT)
Facility: HOSPITAL | Age: 84
Setting detail: RECURRING SERIES
Discharge: HOME | End: 2024-11-09
Payer: MEDICARE

## 2024-11-06 VITALS — BODY MASS INDEX: 32.14 KG/M2 | WEIGHT: 237 LBS

## 2024-11-06 PROCEDURE — 93798 PHYS/QHP OP CAR RHAB W/ECG: CPT

## 2024-11-06 ASSESSMENT — EXERCISE STRESS TEST
PEAK_METS: 3.1
PEAK_RPE: 12

## 2024-11-11 ENCOUNTER — TELEPHONE (OUTPATIENT)
Age: 84
End: 2024-11-11

## 2024-11-11 ENCOUNTER — APPOINTMENT (OUTPATIENT)
Facility: HOSPITAL | Age: 84
End: 2024-11-11
Payer: MEDICARE

## 2024-11-11 NOTE — TELEPHONE ENCOUNTER
Reason for call:  Spoke with pt. Pt requesting a call back from nurse in regards a surgery for his face.     Is this a new problem: Yes    Date of last appointment:  11/4/2024     Can we respond via Javelinhart: No    Best call back number: Checo Melendez \"[facility]   [phone]

## 2024-11-12 NOTE — TELEPHONE ENCOUNTER
Spoke w/ [facility] - scheduled pt for 1st available for Monday November 18th @ 1:30 PM w/ Dr. Samuel.  [facility] location, pt needs to arrive 20 minutes early for check in/paperwork.  Fax referral and last office note to fax# [phone].

## 2024-11-12 NOTE — TELEPHONE ENCOUNTER
Pt returned call - provided appt information, pt voiced understanding.  Referral and last office note faxed to Dr. Samuel's office w/ confirmation received.

## 2024-11-18 ENCOUNTER — HOSPITAL ENCOUNTER (OUTPATIENT)
Facility: HOSPITAL | Age: 84
Setting detail: RECURRING SERIES
Discharge: HOME | End: 2024-11-21
Payer: MEDICARE

## 2024-11-18 VITALS — WEIGHT: 231 LBS | BODY MASS INDEX: 31.33 KG/M2

## 2024-11-18 PROCEDURE — 93798 PHYS/QHP OP CAR RHAB W/ECG: CPT

## 2024-11-18 ASSESSMENT — EXERCISE STRESS TEST
PEAK_METS: 3.1
PEAK_RPE: 12

## 2024-11-20 ENCOUNTER — HOSPITAL ENCOUNTER (OUTPATIENT)
Facility: HOSPITAL | Age: 84
Setting detail: RECURRING SERIES
Discharge: HOME | End: 2024-11-23
Payer: MEDICARE

## 2024-11-20 VITALS — BODY MASS INDEX: 31.06 KG/M2 | WEIGHT: 229 LBS

## 2024-11-20 PROCEDURE — 93798 PHYS/QHP OP CAR RHAB W/ECG: CPT

## 2024-11-20 ASSESSMENT — EXERCISE STRESS TEST
PEAK_METS: 3.1
PEAK_RPE: 12

## 2024-11-22 RX ORDER — SIMVASTATIN 40 MG
TABLET ORAL
Qty: 90 TABLET | Refills: 2 | Status: SHIPPED | OUTPATIENT
Start: 2024-11-22

## 2024-11-25 ENCOUNTER — HOSPITAL ENCOUNTER (OUTPATIENT)
Facility: HOSPITAL | Age: 84
Setting detail: RECURRING SERIES
Discharge: HOME | End: 2024-11-28
Payer: MEDICARE

## 2024-11-25 VITALS — BODY MASS INDEX: 31.19 KG/M2 | WEIGHT: 230 LBS

## 2024-11-25 PROCEDURE — 93798 PHYS/QHP OP CAR RHAB W/ECG: CPT

## 2024-11-25 ASSESSMENT — EXERCISE STRESS TEST
PEAK_RPE: 12
PEAK_METS: 3.1

## 2024-12-02 ENCOUNTER — HOSPITAL ENCOUNTER (OUTPATIENT)
Facility: HOSPITAL | Age: 84
Setting detail: RECURRING SERIES
Discharge: HOME | End: 2024-12-05
Payer: MEDICARE

## 2024-12-02 ENCOUNTER — TELEPHONE (OUTPATIENT)
Age: 84
End: 2024-12-02

## 2024-12-02 VITALS — BODY MASS INDEX: 31.46 KG/M2 | WEIGHT: 232 LBS

## 2024-12-02 PROCEDURE — 93798 PHYS/QHP OP CAR RHAB W/ECG: CPT

## 2024-12-02 ASSESSMENT — EXERCISE STRESS TEST
PEAK_RPE: 12
PEAK_METS: 3.1

## 2024-12-02 NOTE — TELEPHONE ENCOUNTER
----- Message from Valerie KOENIG sent at 12/2/2024 12:00 PM EST -----  Regarding: [facility] Appointment Request  [facility] Appointment Request    Patient needs appointment for [facility] Appointment Type: Pre-Op Visit.    Patient Requested Dates(s): Before December 17   Patient Requested Time: any time   Provider Name: Asael Nagel MD    Reason for Appointment Request: Established Patient - Available appointments did not meet patient need  --------------------------------------------------------------------------------------------------------------------------    Relationship to Patient: Self     Call Back Information: OK to leave message on voicemail  Preferred Call Back Number: Phone [phone]           Patient will be having a mole surgery on December 17 and needs a pre-op before that appointment

## 2024-12-04 ENCOUNTER — HOSPITAL ENCOUNTER (OUTPATIENT)
Facility: HOSPITAL | Age: 84
Setting detail: RECURRING SERIES
End: 2024-12-04
Payer: MEDICARE

## 2024-12-06 ENCOUNTER — HOSPITAL ENCOUNTER (OUTPATIENT)
Age: 84
Discharge: HOME | End: 2024-12-09
Payer: MEDICARE

## 2024-12-06 ENCOUNTER — OFFICE VISIT (OUTPATIENT)
Age: 84
End: 2024-12-06
Payer: MEDICARE

## 2024-12-06 VITALS
BODY MASS INDEX: 31.42 KG/M2 | HEART RATE: 87 BPM | WEIGHT: 232 LBS | SYSTOLIC BLOOD PRESSURE: 131 MMHG | RESPIRATION RATE: 16 BRPM | DIASTOLIC BLOOD PRESSURE: 71 MMHG | HEIGHT: 72 IN | OXYGEN SATURATION: 96 % | TEMPERATURE: 97.1 F

## 2024-12-06 DIAGNOSIS — R05.1 ACUTE COUGH: ICD-10-CM

## 2024-12-06 DIAGNOSIS — R05.1 ACUTE COUGH: Primary | ICD-10-CM

## 2024-12-06 DIAGNOSIS — Z01.818 PRE-OP EVALUATION: ICD-10-CM

## 2024-12-06 DIAGNOSIS — J22 LOWER RESPIRATORY INFECTION (E.G., BRONCHITIS, PNEUMONIA, PNEUMONITIS, PULMONITIS): ICD-10-CM

## 2024-12-06 PROCEDURE — 1159F MED LIST DOCD IN RCRD: CPT | Performed by: NURSE PRACTITIONER

## 2024-12-06 PROCEDURE — G8427 DOCREV CUR MEDS BY ELIG CLIN: HCPCS | Performed by: NURSE PRACTITIONER

## 2024-12-06 PROCEDURE — 3075F SYST BP GE 130 - 139MM HG: CPT | Performed by: NURSE PRACTITIONER

## 2024-12-06 PROCEDURE — G8417 CALC BMI ABV UP PARAM F/U: HCPCS | Performed by: NURSE PRACTITIONER

## 2024-12-06 PROCEDURE — 3078F DIAST BP <80 MM HG: CPT | Performed by: NURSE PRACTITIONER

## 2024-12-06 PROCEDURE — 1123F ACP DISCUSS/DSCN MKR DOCD: CPT | Performed by: NURSE PRACTITIONER

## 2024-12-06 PROCEDURE — 71046 X-RAY EXAM CHEST 2 VIEWS: CPT

## 2024-12-06 PROCEDURE — 1036F TOBACCO NON-USER: CPT | Performed by: NURSE PRACTITIONER

## 2024-12-06 PROCEDURE — 99214 OFFICE O/P EST MOD 30 MIN: CPT | Performed by: NURSE PRACTITIONER

## 2024-12-06 PROCEDURE — G8484 FLU IMMUNIZE NO ADMIN: HCPCS | Performed by: NURSE PRACTITIONER

## 2024-12-06 PROCEDURE — 1126F AMNT PAIN NOTED NONE PRSNT: CPT | Performed by: NURSE PRACTITIONER

## 2024-12-06 RX ORDER — DOXYCYCLINE HYCLATE 100 MG
100 TABLET ORAL 2 TIMES DAILY
Qty: 20 TABLET | Refills: 0 | Status: SHIPPED | OUTPATIENT
Start: 2024-12-06 | End: 2024-12-16

## 2024-12-06 ASSESSMENT — PATIENT HEALTH QUESTIONNAIRE - PHQ9
SUM OF ALL RESPONSES TO PHQ QUESTIONS 1-9: 0
SUM OF ALL RESPONSES TO PHQ QUESTIONS 1-9: 0
2. FEELING DOWN, DEPRESSED OR HOPELESS: NOT AT ALL
SUM OF ALL RESPONSES TO PHQ QUESTIONS 1-9: 0
SUM OF ALL RESPONSES TO PHQ QUESTIONS 1-9: 0
1. LITTLE INTEREST OR PLEASURE IN DOING THINGS: NOT AT ALL
SUM OF ALL RESPONSES TO PHQ9 QUESTIONS 1 & 2: 0

## 2024-12-06 NOTE — PROGRESS NOTES
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 8/20/2010    Essential hypertension     Gout 8/20/2010    Heart failure (HCC)     Ischemic cardiomyopathy     Long term (current) use of antithrombotics/antiplatelets     Mixed hyperlipidemia 8/20/2010    S/P placement of cardiac pacemaker 1/24/2019        Past Surgical History:   Procedure Laterality Date    BIOPSY PROSTATE      CHOLECYSTECTOMY      CORONARY ARTERY BYPASS GRAFT  12/26/06    [place] UNLISTED PROCEDURE CARDIAC SURGERY      cabg        Tetanus up to date: ordered today      Anesthesia Complications: no  History of abnormal bleeding : no  History of Blood Transfusions: No  Health Care Directive or Living Will: No    REVIEW OF SYSTEMS:  Pertinent items are noted in HPI.    EXAM:   /71   Pulse 87   Temp 97.1 °F (36.2 °C) (Temporal)   Resp 16   Ht 1.829 m (6')   Wt 105.2 kg (232 lb)   SpO2 96%   BMI 31.46 kg/m²   Physical Examination: General appearance - alert, well appearing, and in no distress  Mental status - alert, oriented to person, place, and time  Eyes - pupils equal and reactive, extraocular eye movements intact  Ears - bilateral TM's and external ear canals normal  Nose - normal and patent, no erythema, discharge or polyps  Mouth - mucous membranes moist, pharynx normal without lesions  Neck - supple, no significant adenopathy  Lymphatics - no palpable lymphadenopathy, no hepatosplenomegaly  Chest - wheezing noted and crackles on right side  Heart - normal rate, regular rhythm, normal S1, S2, no murmurs, rubs, clicks or gallops  Abdomen - soft, nontender, nondistended, no masses or organomegaly  Neurological - alert, oriented, normal speech, no focal findings or movement disorder noted  Musculoskeletal - no joint tenderness, deformity or swelling  Extremities - peripheral pulses normal, no pedal edema, no clubbing or cyanosis  Skin - right cheek skin lesion       DIAGNOSTICS:   1. CXR: ordered  2. Labs:

## 2024-12-09 ENCOUNTER — HOSPITAL ENCOUNTER (OUTPATIENT)
Facility: HOSPITAL | Age: 84
Setting detail: RECURRING SERIES
Discharge: HOME | End: 2024-12-12
Payer: MEDICARE

## 2024-12-09 VITALS — WEIGHT: 231 LBS | BODY MASS INDEX: 31.33 KG/M2

## 2024-12-09 PROCEDURE — 93798 PHYS/QHP OP CAR RHAB W/ECG: CPT

## 2024-12-09 ASSESSMENT — EXERCISE STRESS TEST
PEAK_METS: 3.1
PEAK_RPE: 12

## 2024-12-11 ENCOUNTER — HOSPITAL ENCOUNTER (OUTPATIENT)
Facility: HOSPITAL | Age: 84
Setting detail: RECURRING SERIES
Discharge: HOME | End: 2024-12-14
Payer: MEDICARE

## 2024-12-11 VITALS — WEIGHT: 231 LBS | BODY MASS INDEX: 31.33 KG/M2

## 2024-12-11 PROCEDURE — 93798 PHYS/QHP OP CAR RHAB W/ECG: CPT

## 2024-12-11 ASSESSMENT — EXERCISE STRESS TEST
PEAK_RPE: 12
PEAK_METS: 3.1

## 2024-12-16 ENCOUNTER — HOSPITAL ENCOUNTER (OUTPATIENT)
Facility: HOSPITAL | Age: 84
Setting detail: RECURRING SERIES
Discharge: HOME | End: 2024-12-19
Payer: MEDICARE

## 2024-12-16 VITALS — WEIGHT: 233 LBS | BODY MASS INDEX: 31.6 KG/M2

## 2024-12-16 PROCEDURE — 93798 PHYS/QHP OP CAR RHAB W/ECG: CPT

## 2024-12-16 ASSESSMENT — EXERCISE STRESS TEST
PEAK_METS: 1.8
PEAK_RPE: 12

## 2024-12-18 ENCOUNTER — APPOINTMENT (OUTPATIENT)
Facility: HOSPITAL | Age: 84
End: 2024-12-18
Payer: MEDICARE

## 2024-12-23 ENCOUNTER — APPOINTMENT (OUTPATIENT)
Facility: HOSPITAL | Age: 84
End: 2024-12-23
Payer: MEDICARE

## 2024-12-30 ENCOUNTER — HOSPITAL ENCOUNTER (OUTPATIENT)
Facility: HOSPITAL | Age: 84
Setting detail: RECURRING SERIES
Discharge: HOME | End: 2025-01-02
Payer: MEDICARE

## 2024-12-30 VITALS — BODY MASS INDEX: 31.6 KG/M2 | WEIGHT: 233 LBS

## 2024-12-30 PROCEDURE — 93798 PHYS/QHP OP CAR RHAB W/ECG: CPT

## 2024-12-30 ASSESSMENT — EXERCISE STRESS TEST
PEAK_RPE: 12
PEAK_METS: 1.8

## 2025-01-02 ENCOUNTER — TELEPHONE (OUTPATIENT)
Age: 85
End: 2025-01-02

## 2025-01-02 DIAGNOSIS — E04.1 THYROID NODULE: Primary | ICD-10-CM

## 2025-01-02 NOTE — TELEPHONE ENCOUNTER
Gay from [facility] called.  Dr. Cyril Botello would like to speak to Dr. Nagel, when he has a chance, regarding the patient.    Dr. Botello - [facility] - [phone]

## 2025-01-02 NOTE — TELEPHONE ENCOUNTER
Please order thyroid US for nodules noted on his chest CT from cardiology.     Order placed per PCP's request.

## 2025-01-06 ENCOUNTER — APPOINTMENT (OUTPATIENT)
Facility: HOSPITAL | Age: 85
End: 2025-01-06
Payer: MEDICARE

## 2025-01-08 ENCOUNTER — HOSPITAL ENCOUNTER (OUTPATIENT)
Facility: HOSPITAL | Age: 85
Setting detail: RECURRING SERIES
Discharge: HOME | End: 2025-01-11
Payer: MEDICARE

## 2025-01-08 VITALS — WEIGHT: 236 LBS | BODY MASS INDEX: 32.01 KG/M2

## 2025-01-08 PROCEDURE — 93798 PHYS/QHP OP CAR RHAB W/ECG: CPT

## 2025-01-08 ASSESSMENT — EXERCISE STRESS TEST: PEAK_RPE: 12

## 2025-01-13 ENCOUNTER — HOSPITAL ENCOUNTER (OUTPATIENT)
Facility: HOSPITAL | Age: 85
Setting detail: RECURRING SERIES
Discharge: HOME | End: 2025-01-16
Payer: MEDICARE

## 2025-01-13 VITALS — BODY MASS INDEX: 32.01 KG/M2 | WEIGHT: 236 LBS

## 2025-01-13 PROCEDURE — 93798 PHYS/QHP OP CAR RHAB W/ECG: CPT

## 2025-01-13 ASSESSMENT — EXERCISE STRESS TEST
PEAK_RPE: 12
PEAK_METS: 1.8

## 2025-01-15 ENCOUNTER — HOSPITAL ENCOUNTER (OUTPATIENT)
Facility: HOSPITAL | Age: 85
Setting detail: RECURRING SERIES
Discharge: HOME | End: 2025-01-18
Payer: MEDICARE

## 2025-01-15 VITALS — WEIGHT: 234 LBS | BODY MASS INDEX: 31.74 KG/M2

## 2025-01-15 PROCEDURE — 93798 PHYS/QHP OP CAR RHAB W/ECG: CPT

## 2025-01-15 RX ORDER — CLOPIDOGREL BISULFATE 75 MG/1
75 TABLET ORAL DAILY
Qty: 90 TABLET | Refills: 0 | Status: SHIPPED | OUTPATIENT
Start: 2025-01-15

## 2025-01-15 ASSESSMENT — EXERCISE STRESS TEST
PEAK_METS: 2.2
PEAK_RPE: 12

## 2025-01-15 NOTE — TELEPHONE ENCOUNTER
Chief Complaint   Patient presents with    Medication Refill     Last Appointment with Dr. Asael Nagel:  11/4/2024   Future Appointments   Date Time Provider Department Center   1/15/2025 10:00 AM [facility] CARDIAC WELL[facility] EXERC[facility]   1/20/2025 10:00 AM [facility] CARDIAC WELL[facility] EXERC[facility]   1/22/2025 10:00 AM [facility] CARDIAC WELL[facility]ERC[facility]   1/27/2025 10:00 AM [facility] CARDIAC WELL[facility] EXERC[facility]   1/29/2025 10:00 AM [facility] CARDIAC WELL[facility] EXERC[facility]   2/3/2025 10:00 AM [facility] CARDIAC [facility]LL[facility] EXERC[facility]     Requested Prescriptions     Pending Prescriptions Disp Refills    clopidogrel (PLAVIX) 75 MG tablet [Pharmacy Med Name: CLOPIDOGREL 75 MG TABLET] 90 tablet 1     Sig: TAKE 1 TABLET BY MOUTH DAILY

## 2025-01-20 ENCOUNTER — APPOINTMENT (OUTPATIENT)
Facility: HOSPITAL | Age: 85
End: 2025-01-20
Payer: MEDICARE

## 2025-01-22 ENCOUNTER — HOSPITAL ENCOUNTER (OUTPATIENT)
Facility: HOSPITAL | Age: 85
Setting detail: RECURRING SERIES
End: 2025-01-22
Payer: MEDICARE

## 2025-01-22 SDOH — ECONOMIC STABILITY: FOOD INSECURITY: WITHIN THE PAST 12 MONTHS, THE FOOD YOU BOUGHT JUST DIDN'T LAST AND YOU DIDN'T HAVE MONEY TO GET MORE.: NEVER TRUE

## 2025-01-22 SDOH — ECONOMIC STABILITY: TRANSPORTATION INSECURITY
IN THE PAST 12 MONTHS, HAS THE LACK OF TRANSPORTATION KEPT YOU FROM MEDICAL APPOINTMENTS OR FROM GETTING MEDICATIONS?: NO

## 2025-01-22 SDOH — ECONOMIC STABILITY: FOOD INSECURITY: WITHIN THE PAST 12 MONTHS, YOU WORRIED THAT YOUR FOOD WOULD RUN OUT BEFORE YOU GOT MONEY TO BUY MORE.: NEVER TRUE

## 2025-01-22 SDOH — ECONOMIC STABILITY: INCOME INSECURITY: IN THE LAST 12 MONTHS, WAS THERE A TIME WHEN YOU WERE NOT ABLE TO PAY THE MORTGAGE OR RENT ON TIME?: YES

## 2025-01-22 SDOH — ECONOMIC STABILITY: TRANSPORTATION INSECURITY
IN THE PAST 12 MONTHS, HAS LACK OF TRANSPORTATION KEPT YOU FROM MEETINGS, WORK, OR FROM GETTING THINGS NEEDED FOR DAILY LIVING?: NO

## 2025-01-23 ENCOUNTER — TELEPHONE (OUTPATIENT)
Age: 85
End: 2025-01-23

## 2025-01-23 ENCOUNTER — HOSPITAL ENCOUNTER (OUTPATIENT)
Facility: HOSPITAL | Age: 85
Discharge: HOME | End: 2025-01-26
Payer: MEDICARE

## 2025-01-23 ENCOUNTER — OFFICE VISIT (OUTPATIENT)
Age: 85
End: 2025-01-23
Payer: MEDICARE

## 2025-01-23 VITALS
WEIGHT: 237.8 LBS | SYSTOLIC BLOOD PRESSURE: 152 MMHG | HEART RATE: 93 BPM | OXYGEN SATURATION: 98 % | RESPIRATION RATE: 16 BRPM | DIASTOLIC BLOOD PRESSURE: 80 MMHG | BODY MASS INDEX: 32.21 KG/M2 | HEIGHT: 72 IN | TEMPERATURE: 97.5 F

## 2025-01-23 DIAGNOSIS — R05.1 ACUTE COUGH: ICD-10-CM

## 2025-01-23 DIAGNOSIS — I50.22 CHRONIC SYSTOLIC CONGESTIVE HEART FAILURE (HCC): ICD-10-CM

## 2025-01-23 DIAGNOSIS — R05.1 ACUTE COUGH: Primary | ICD-10-CM

## 2025-01-23 DIAGNOSIS — J18.9 PNEUMONIA OF RIGHT MIDDLE LOBE DUE TO INFECTIOUS ORGANISM: Primary | ICD-10-CM

## 2025-01-23 PROCEDURE — 1036F TOBACCO NON-USER: CPT | Performed by: NURSE PRACTITIONER

## 2025-01-23 PROCEDURE — G8427 DOCREV CUR MEDS BY ELIG CLIN: HCPCS | Performed by: NURSE PRACTITIONER

## 2025-01-23 PROCEDURE — 71046 X-RAY EXAM CHEST 2 VIEWS: CPT

## 2025-01-23 PROCEDURE — G8417 CALC BMI ABV UP PARAM F/U: HCPCS | Performed by: NURSE PRACTITIONER

## 2025-01-23 PROCEDURE — 1126F AMNT PAIN NOTED NONE PRSNT: CPT | Performed by: NURSE PRACTITIONER

## 2025-01-23 PROCEDURE — 3077F SYST BP >= 140 MM HG: CPT | Performed by: NURSE PRACTITIONER

## 2025-01-23 PROCEDURE — 99213 OFFICE O/P EST LOW 20 MIN: CPT | Performed by: NURSE PRACTITIONER

## 2025-01-23 PROCEDURE — 1123F ACP DISCUSS/DSCN MKR DOCD: CPT | Performed by: NURSE PRACTITIONER

## 2025-01-23 PROCEDURE — 3079F DIAST BP 80-89 MM HG: CPT | Performed by: NURSE PRACTITIONER

## 2025-01-23 PROCEDURE — 1159F MED LIST DOCD IN RCRD: CPT | Performed by: NURSE PRACTITIONER

## 2025-01-23 PROCEDURE — 1160F RVW MEDS BY RX/DR IN RCRD: CPT | Performed by: NURSE PRACTITIONER

## 2025-01-23 RX ORDER — APIXABAN 5 MG/1
5 TABLET, FILM COATED ORAL 2 TIMES DAILY
COMMUNITY
Start: 2025-01-08

## 2025-01-23 RX ORDER — AZITHROMYCIN 250 MG/1
TABLET, FILM COATED ORAL
Qty: 6 TABLET | Refills: 0 | Status: SHIPPED | OUTPATIENT
Start: 2025-01-23 | End: 2025-02-02

## 2025-01-23 SDOH — ECONOMIC STABILITY: FOOD INSECURITY: WITHIN THE PAST 12 MONTHS, THE FOOD YOU BOUGHT JUST DIDN'T LAST AND YOU DIDN'T HAVE MONEY TO GET MORE.: NEVER TRUE

## 2025-01-23 SDOH — ECONOMIC STABILITY: FOOD INSECURITY: WITHIN THE PAST 12 MONTHS, YOU WORRIED THAT YOUR FOOD WOULD RUN OUT BEFORE YOU GOT MONEY TO BUY MORE.: NEVER TRUE

## 2025-01-23 ASSESSMENT — PATIENT HEALTH QUESTIONNAIRE - PHQ9
SUM OF ALL RESPONSES TO PHQ QUESTIONS 1-9: 0
SUM OF ALL RESPONSES TO PHQ9 QUESTIONS 1 & 2: 0
SUM OF ALL RESPONSES TO PHQ QUESTIONS 1-9: 0
2. FEELING DOWN, DEPRESSED OR HOPELESS: NOT AT ALL
1. LITTLE INTEREST OR PLEASURE IN DOING THINGS: NOT AT ALL

## 2025-01-23 ASSESSMENT — ENCOUNTER SYMPTOMS
SHORTNESS OF BREATH: 1
COUGH: 1

## 2025-01-23 NOTE — TELEPHONE ENCOUNTER
Patient notified of CXR result. Double antibiotics ordered. Repeat CXR in 1 month. Push back ablation x 1 week or until symptoms improved

## 2025-01-23 NOTE — PROGRESS NOTES
Checo Melendez (:  1940) is a 84 y.o. male,here for evaluation of the following chief complaint(s):  Cough    BP (!) 152/80 (Site: Right Upper Arm, Position: Sitting, Cuff Size: Medium Adult)   Pulse 93   Temp 97.5 °F (36.4 °C) (Oral)   Resp 16   Ht 1.829 m (6')   Wt 107.9 kg (237 lb 12.8 oz)   SpO2 98%   BMI 32.25 kg/m²       SUBJECTIVE/OBJECTIVE:    HPI:Patient presents for lingering cough and worsening shortness of breath over the past month. He is followed by cardiology for CHF and nephrology. He is taking lasix 40 mg daily. Ankle have been more swollen lately. Patient is scheduled for ablation next week. Weight is stable.    Review of Systems   Respiratory:  Positive for cough and shortness of breath.        Physical Exam  Constitutional:       Appearance: Normal appearance.   Cardiovascular:      Rate and Rhythm: Normal rate and regular rhythm.   Pulmonary:      Effort: Pulmonary effort is normal.      Comments: Mild scattered crackles, worse on right  Musculoskeletal:      Right lower leg: Edema present.      Left lower leg: Edema present.   Skin:     General: Skin is warm and dry.   Neurological:      General: No focal deficit present.      Mental Status: He is alert and oriented to person, place, and time.   Psychiatric:         Mood and Affect: Mood normal.         Behavior: Behavior normal.          ASSESSMENT/PLAN:  1. Acute cough  -     XR CHEST (2 VIEWS); Future  2. Chronic systolic congestive heart failure (HCC)  Follow-up with cardiology regarding SOB-discuss plavix and eliquis also  Cxr ordered    --PHIL Lemon - NP

## 2025-01-27 ENCOUNTER — APPOINTMENT (OUTPATIENT)
Facility: HOSPITAL | Age: 85
End: 2025-01-27
Payer: MEDICARE

## 2025-01-29 ENCOUNTER — APPOINTMENT (OUTPATIENT)
Facility: HOSPITAL | Age: 85
End: 2025-01-29
Payer: MEDICARE

## 2025-02-05 ENCOUNTER — HOSPITAL ENCOUNTER (OUTPATIENT)
Facility: HOSPITAL | Age: 85
Setting detail: RECURRING SERIES
End: 2025-02-05
Payer: MEDICARE

## 2025-02-05 NOTE — CARDIO/PULMONARY
Checo Melendez is currently unable to regularly participate at Cardiac Rehab due to illness.  Their participation will be put on hold for now and their ITP will be updated upon their return.  We will continue to follow up.      TONY HARTMAN RN

## 2025-02-10 ENCOUNTER — APPOINTMENT (OUTPATIENT)
Facility: HOSPITAL | Age: 85
End: 2025-02-10
Payer: MEDICARE

## 2025-02-12 ENCOUNTER — APPOINTMENT (OUTPATIENT)
Facility: HOSPITAL | Age: 85
End: 2025-02-12
Payer: MEDICARE

## 2025-02-17 ENCOUNTER — APPOINTMENT (OUTPATIENT)
Facility: HOSPITAL | Age: 85
End: 2025-02-17
Payer: MEDICARE

## 2025-02-17 ENCOUNTER — HOSPITAL ENCOUNTER (OUTPATIENT)
Facility: HOSPITAL | Age: 85
Setting detail: RECURRING SERIES
Discharge: HOME | End: 2025-02-20
Payer: MEDICARE

## 2025-02-17 VITALS — WEIGHT: 236.2 LBS | BODY MASS INDEX: 32.03 KG/M2

## 2025-02-17 PROCEDURE — 93798 PHYS/QHP OP CAR RHAB W/ECG: CPT

## 2025-02-17 ASSESSMENT — PATIENT HEALTH QUESTIONNAIRE - PHQ9
SUM OF ALL RESPONSES TO PHQ QUESTIONS 1-9: 3
2. FEELING DOWN, DEPRESSED OR HOPELESS: NOT AT ALL
9. THOUGHTS THAT YOU WOULD BE BETTER OFF DEAD, OR OF HURTING YOURSELF: NOT AT ALL
SUM OF ALL RESPONSES TO PHQ9 QUESTIONS 1 & 2: 0
6. FEELING BAD ABOUT YOURSELF - OR THAT YOU ARE A FAILURE OR HAVE LET YOURSELF OR YOUR FAMILY DOWN: NOT AT ALL
7. TROUBLE CONCENTRATING ON THINGS, SUCH AS READING THE NEWSPAPER OR WATCHING TELEVISION: NOT AT ALL
SUM OF ALL RESPONSES TO PHQ QUESTIONS 1-9: 3
3. TROUBLE FALLING OR STAYING ASLEEP: SEVERAL DAYS
5. POOR APPETITE OR OVEREATING: NOT AT ALL
10. IF YOU CHECKED OFF ANY PROBLEMS, HOW DIFFICULT HAVE THESE PROBLEMS MADE IT FOR YOU TO DO YOUR WORK, TAKE CARE OF THINGS AT HOME, OR GET ALONG WITH OTHER PEOPLE: NOT DIFFICULT AT ALL
8. MOVING OR SPEAKING SO SLOWLY THAT OTHER PEOPLE COULD HAVE NOTICED. OR THE OPPOSITE, BEING SO FIGETY OR RESTLESS THAT YOU HAVE BEEN MOVING AROUND A LOT MORE THAN USUAL: NOT AT ALL
4. FEELING TIRED OR HAVING LITTLE ENERGY: MORE THAN HALF THE DAYS
SUM OF ALL RESPONSES TO PHQ QUESTIONS 1-9: 3
1. LITTLE INTEREST OR PLEASURE IN DOING THINGS: NOT AT ALL
SUM OF ALL RESPONSES TO PHQ QUESTIONS 1-9: 3

## 2025-02-17 ASSESSMENT — EXERCISE STRESS TEST
PEAK_HR: 93
PEAK_RPE: 12
PEAK_METS: 2.6
PEAK_BP: 136/80

## 2025-02-17 NOTE — CARDIO/PULMONARY
Rehab staff.  They are aware of their cardiac disease risk factors and cardiac medications. All questions were addressed and discharge plans discussed. Checo Melendez verbalized understanding.      Mer Thomas, RN, BSN  2/17/2025

## 2025-02-19 ENCOUNTER — APPOINTMENT (OUTPATIENT)
Facility: HOSPITAL | Age: 85
End: 2025-02-19
Payer: MEDICARE

## 2025-02-24 ENCOUNTER — APPOINTMENT (OUTPATIENT)
Facility: HOSPITAL | Age: 85
End: 2025-02-24
Payer: MEDICARE

## 2025-02-28 RX ORDER — TAMSULOSIN HYDROCHLORIDE 0.4 MG/1
CAPSULE ORAL
Qty: 60 CAPSULE | Refills: 4 | Status: SHIPPED | OUTPATIENT
Start: 2025-02-28

## 2025-04-03 ENCOUNTER — TELEPHONE (OUTPATIENT)
Age: 85
End: 2025-04-03

## 2025-04-06 RX ORDER — ALLOPURINOL 300 MG/1
300 TABLET ORAL DAILY
Qty: 90 TABLET | Refills: 2 | Status: SHIPPED | OUTPATIENT
Start: 2025-04-06

## 2025-04-13 RX ORDER — CLOPIDOGREL BISULFATE 75 MG/1
75 TABLET ORAL DAILY
Qty: 90 TABLET | Refills: 0 | Status: SHIPPED | OUTPATIENT
Start: 2025-04-13

## 2025-04-14 ENCOUNTER — TELEPHONE (OUTPATIENT)
Age: 85
End: 2025-04-14

## 2025-04-14 NOTE — TELEPHONE ENCOUNTER
Reason for call:  pt has questions regarding his blood thinners, he has had 3 nose bleeds, needs to discuss this    Is this a new problem: Yes    Date of last appointment:  1/23/2025     Can we respond via Voxli: No    Best call back number:     hCeco Melendez \"Mac\" (Self) [phone] (Mobile)

## 2025-04-14 NOTE — TELEPHONE ENCOUNTER
Pt reports he has had 3 episodes of epistaxis that have required cauterization by ENT.    They advised pt follow up with Asael Nagel MD to discuss his blood thinners.  Pt reports he is on Plavix 75 mg once daily in addition to Eliquis 5 mg BID.  He is unsure as to why he is on two.  Denies ASA use.  No s/sx of bleeding at this time.  Advised would review with Asael Nagel MD and return call.

## 2025-06-12 RX ORDER — CARVEDILOL 25 MG/1
TABLET ORAL
Qty: 90 TABLET | Refills: 2 | Status: SHIPPED | OUTPATIENT
Start: 2025-06-12

## 2025-07-27 RX ORDER — TAMSULOSIN HYDROCHLORIDE 0.4 MG/1
CAPSULE ORAL
Qty: 60 CAPSULE | Refills: 4 | Status: SHIPPED | OUTPATIENT
Start: 2025-07-27

## 2025-08-13 SDOH — HEALTH STABILITY: PHYSICAL HEALTH: ON AVERAGE, HOW MANY MINUTES DO YOU ENGAGE IN EXERCISE AT THIS LEVEL?: 20 MIN

## 2025-08-13 SDOH — HEALTH STABILITY: PHYSICAL HEALTH: ON AVERAGE, HOW MANY DAYS PER WEEK DO YOU ENGAGE IN MODERATE TO STRENUOUS EXERCISE (LIKE A BRISK WALK)?: 2 DAYS

## 2025-08-13 ASSESSMENT — PATIENT HEALTH QUESTIONNAIRE - PHQ9
SUM OF ALL RESPONSES TO PHQ QUESTIONS 1-9: 0
2. FEELING DOWN, DEPRESSED OR HOPELESS: NOT AT ALL
1. LITTLE INTEREST OR PLEASURE IN DOING THINGS: NOT AT ALL
SUM OF ALL RESPONSES TO PHQ QUESTIONS 1-9: 0

## 2025-08-13 ASSESSMENT — LIFESTYLE VARIABLES
HOW OFTEN DO YOU HAVE A DRINK CONTAINING ALCOHOL: MONTHLY OR LESS
HOW MANY STANDARD DRINKS CONTAINING ALCOHOL DO YOU HAVE ON A TYPICAL DAY: 0
HOW MANY STANDARD DRINKS CONTAINING ALCOHOL DO YOU HAVE ON A TYPICAL DAY: PATIENT DOES NOT DRINK
HOW OFTEN DO YOU HAVE SIX OR MORE DRINKS ON ONE OCCASION: 1
HOW OFTEN DO YOU HAVE A DRINK CONTAINING ALCOHOL: 2

## 2025-08-17 RX ORDER — SIMVASTATIN 40 MG
40 TABLET ORAL NIGHTLY
Qty: 90 TABLET | Refills: 2 | Status: SHIPPED | OUTPATIENT
Start: 2025-08-17

## 2025-08-20 ENCOUNTER — OFFICE VISIT (OUTPATIENT)
Age: 85
End: 2025-08-20
Payer: MEDICARE

## 2025-08-20 VITALS
WEIGHT: 247.6 LBS | DIASTOLIC BLOOD PRESSURE: 69 MMHG | OXYGEN SATURATION: 96 % | SYSTOLIC BLOOD PRESSURE: 130 MMHG | RESPIRATION RATE: 15 BRPM | HEIGHT: 72 IN | TEMPERATURE: 98.1 F | HEART RATE: 71 BPM | BODY MASS INDEX: 33.54 KG/M2

## 2025-08-20 DIAGNOSIS — I10 PRIMARY HYPERTENSION: ICD-10-CM

## 2025-08-20 DIAGNOSIS — D69.6 THROMBOCYTOPENIA, UNSPECIFIED: ICD-10-CM

## 2025-08-20 DIAGNOSIS — N18.30 STAGE 3 CHRONIC KIDNEY DISEASE, UNSPECIFIED WHETHER STAGE 3A OR 3B CKD (HCC): ICD-10-CM

## 2025-08-20 DIAGNOSIS — E78.2 MIXED HYPERLIPIDEMIA: ICD-10-CM

## 2025-08-20 DIAGNOSIS — N40.1 BENIGN PROSTATIC HYPERPLASIA WITH LOWER URINARY TRACT SYMPTOMS, SYMPTOM DETAILS UNSPECIFIED: ICD-10-CM

## 2025-08-20 DIAGNOSIS — Z00.00 MEDICARE ANNUAL WELLNESS VISIT, SUBSEQUENT: Primary | ICD-10-CM

## 2025-08-20 DIAGNOSIS — I50.9 CHRONIC CONGESTIVE HEART FAILURE, UNSPECIFIED HEART FAILURE TYPE (HCC): ICD-10-CM

## 2025-08-20 DIAGNOSIS — E55.9 VITAMIN D DEFICIENCY DISEASE: ICD-10-CM

## 2025-08-20 DIAGNOSIS — M1A.29X0 DRUG-INDUCED CHRONIC GOUT OF MULTIPLE SITES WITHOUT TOPHUS: ICD-10-CM

## 2025-08-20 DIAGNOSIS — I25.10 ATHEROSCLEROSIS OF NATIVE CORONARY ARTERY OF NATIVE HEART WITHOUT ANGINA PECTORIS: ICD-10-CM

## 2025-08-20 PROCEDURE — 1123F ACP DISCUSS/DSCN MKR DOCD: CPT | Performed by: INTERNAL MEDICINE

## 2025-08-20 PROCEDURE — 1036F TOBACCO NON-USER: CPT | Performed by: INTERNAL MEDICINE

## 2025-08-20 PROCEDURE — G8417 CALC BMI ABV UP PARAM F/U: HCPCS | Performed by: INTERNAL MEDICINE

## 2025-08-20 PROCEDURE — 1126F AMNT PAIN NOTED NONE PRSNT: CPT | Performed by: INTERNAL MEDICINE

## 2025-08-20 PROCEDURE — 3075F SYST BP GE 130 - 139MM HG: CPT | Performed by: INTERNAL MEDICINE

## 2025-08-20 PROCEDURE — G0439 PPPS, SUBSEQ VISIT: HCPCS | Performed by: INTERNAL MEDICINE

## 2025-08-20 PROCEDURE — 99214 OFFICE O/P EST MOD 30 MIN: CPT | Performed by: INTERNAL MEDICINE

## 2025-08-20 PROCEDURE — 3078F DIAST BP <80 MM HG: CPT | Performed by: INTERNAL MEDICINE

## 2025-08-20 PROCEDURE — 1159F MED LIST DOCD IN RCRD: CPT | Performed by: INTERNAL MEDICINE

## 2025-08-20 PROCEDURE — G8427 DOCREV CUR MEDS BY ELIG CLIN: HCPCS | Performed by: INTERNAL MEDICINE

## 2025-08-20 RX ORDER — BUDESONIDE, GLYCOPYRROLATE, AND FORMOTEROL FUMARATE 160; 9; 4.8 UG/1; UG/1; UG/1
AEROSOL, METERED RESPIRATORY (INHALATION)
COMMUNITY
Start: 2025-08-19

## 2025-08-20 RX ORDER — TADALAFIL 5 MG/1
5 TABLET ORAL DAILY
Qty: 90 TABLET | Refills: 1 | Status: SHIPPED | OUTPATIENT
Start: 2025-08-20

## 2025-08-20 ASSESSMENT — LIFESTYLE VARIABLES
HOW OFTEN DO YOU HAVE A DRINK CONTAINING ALCOHOL: MONTHLY OR LESS
HOW MANY STANDARD DRINKS CONTAINING ALCOHOL DO YOU HAVE ON A TYPICAL DAY: 1 OR 2

## 2025-08-21 LAB
CHOLEST SERPL-MCNC: 115 MG/DL (ref 0–200)
HDLC SERPL-MCNC: 30 MG/DL (ref 40–60)
HDLC SERPL: 3.8 (ref 0–5)
LDLC SERPL CALC-MCNC: 44 MG/DL (ref 0–100)
TRIGL SERPL-MCNC: 206 MG/DL (ref 0–150)
TSH, 3RD GENERATION: 1.03 UIU/ML (ref 0.27–4.2)
VLDLC SERPL CALC-MCNC: 41 MG/DL

## (undated) DEVICE — PINNACLE INTRODUCER SHEATH: Brand: PINNACLE

## (undated) DEVICE — Device

## (undated) DEVICE — CATHETER PTCA L138CM BLLN L15MM DIA1.5MM 0.021IN 12ATM

## (undated) DEVICE — CATH DIAGIMPLS MP 5FRX110CM -- IMPULSE 16391-300

## (undated) DEVICE — HI-TORQUE BALANCE MIDDLEWEIGHT UNIVERSAL GUIDE WIRE .014 STRAIGHT TIP 3.0 CM X 300 CM: Brand: HI-TORQUE BALANCE MIDDLEWEIGHT UNIVERSAL

## (undated) DEVICE — KIT MED IMAG CNTRST AGNT W/ IOPAMIDOL REUSE

## (undated) DEVICE — CATH GUID COR JL4.0 6FR 100CM -- LAUNCHER

## (undated) DEVICE — ANGIO-SEAL VIP VASCULAR CLOSURE DEVICE: Brand: ANGIO-SEAL

## (undated) DEVICE — ANGIOGRAPHY KIT

## (undated) DEVICE — HI-TORQUE WHISPER MS GUIDE WIRE .014 STRAIGHT TIP 3.0 CM X 300 CM: Brand: HI-TORQUE WHISPER

## (undated) DEVICE — ANGIOGRAPHIC CATHETER: Brand: IMPULSE™

## (undated) DEVICE — KIT HND CTRL 3 W STPCOCK ROT END 54IN PREM HI PRSS TBNG AT

## (undated) DEVICE — CUSTOM KT PTCA INFL DEV K05 00052M

## (undated) DEVICE — KIT MFLD ISOLATN NACL CNTRST PRT TBNG SPIK W/ PRSS TRNSDUC

## (undated) DEVICE — PACK PROCEDURE SURG HRT CATH

## (undated) DEVICE — CATHETER PTCA L138CM BLLN L15MM DIA2.5MM CROSSING PROF

## (undated) DEVICE — MINI TREK™ II CORONARY DILATATION CATHETER 2.00 MM X 15 MM / OVER-THE-WIRE: Brand: MINI TREK™